# Patient Record
Sex: FEMALE | Race: WHITE | NOT HISPANIC OR LATINO | Employment: OTHER | RURAL
[De-identification: names, ages, dates, MRNs, and addresses within clinical notes are randomized per-mention and may not be internally consistent; named-entity substitution may affect disease eponyms.]

---

## 2018-07-18 ENCOUNTER — HISTORICAL (OUTPATIENT)
Dept: ADMINISTRATIVE | Facility: HOSPITAL | Age: 53
End: 2018-07-18

## 2018-07-19 LAB
LAB AP CLINICAL INFORMATION: NORMAL
LAB AP DIAGNOSIS - HISTORICAL: NORMAL
LAB AP GROSS PATHOLOGY - HISTORICAL: NORMAL
LAB AP SPECIMEN SUBMITTED - HISTORICAL: NORMAL

## 2018-08-21 ENCOUNTER — HISTORICAL (OUTPATIENT)
Dept: ADMINISTRATIVE | Facility: HOSPITAL | Age: 53
End: 2018-08-21

## 2020-10-12 ENCOUNTER — HISTORICAL (OUTPATIENT)
Dept: ADMINISTRATIVE | Facility: HOSPITAL | Age: 55
End: 2020-10-12

## 2020-10-12 LAB
25(OH)D3 SERPL-MCNC: 29.5 NG/ML
ALBUMIN SERPL BCP-MCNC: 3.6 G/DL (ref 3.5–5.5)
ALBUMIN/GLOB SERPL: 1 {RATIO}
ALP SERPL-CCNC: 77 U/L (ref 42–141)
ALT SERPL W P-5'-P-CCNC: 15 U/L (ref 10–47)
ANION GAP SERPL CALCULATED.3IONS-SCNC: 13.6 MMOL/L (ref 7–16)
AST SERPL W P-5'-P-CCNC: 23 U/L (ref 11–38)
BILIRUB SERPL-MCNC: 0.3 MG/DL (ref 0.2–1.6)
BUN SERPL-MCNC: 9 MG/DL (ref 7–22)
BUN/CREAT SERPL: 11.3 G/DL
CALCIUM SERPL-MCNC: 8.8 MG/DL (ref 8–10.3)
CHLORIDE SERPL-SCNC: 99 MMOL/L (ref 98–108)
CHOLEST SERPL-MCNC: 177 MG/DL
CHOLEST/HDLC SERPL: 5.2 {RATIO}
CO2 SERPL-SCNC: 24 MMOL/L (ref 18–33)
CREAT SERPL-MCNC: 0.8 MG/DL (ref 0.6–1.2)
ERYTHROCYTE [DISTWIDTH] IN BLOOD BY AUTOMATED COUNT: 14.2 % (ref 11.5–14.5)
EST. AVERAGE GLUCOSE BLD GHB EST-MCNC: 327 MG/DL
GLOBULIN SER-MCNC: 4 G/DL
GLUCOSE SERPL-MCNC: 252 MG/DL (ref 73–118)
HBA1C MFR BLD HPLC: 12.4 % (ref 4.5–6.6)
HCT VFR BLD AUTO: 39.4 % (ref 38–47)
HDLC SERPL-MCNC: 34 MG/DL
HGB BLD-MCNC: 12.4 G/DL (ref 12–16)
LDLC SERPL CALC-MCNC: 74 MG/DL
LYMPHOCYTES # BLD AUTO: 3.9 X10E3/UL (ref 0.9–3)
LYMPHOCYTES NFR BLD AUTO: 29 % (ref 16.8–42.5)
MAGNESIUM SERPL-MCNC: 1.8 MG/DL (ref 1.7–2.3)
MCH RBC QN AUTO: 26.5 PG (ref 27–31)
MCHC RBC AUTO-ENTMCNC: 31.5 G/DL (ref 32–36)
MCV RBC AUTO: 84.2 FL (ref 80–96)
MIXED, AUTO ABSOLUTE: 0.7 X10E3/UL (ref 0.3–1.1)
MIXED, AUTO RELATIVE: 5.4 % (ref 4.7–13.3)
MPC BLD CALC-MCNC: 10.3 FL (ref 9.4–12.4)
NEUTROPHILS # BLD AUTO: 8.7 X10E3/UL (ref 2.4–7.5)
NEUTROPHILS NFR BLD AUTO: 65.6 % (ref 46.4–74.7)
PLATELET # BLD AUTO: 321 X10E3/UL (ref 150–400)
POTASSIUM SERPL-SCNC: 3.6 MMOL/L (ref 3.6–5.1)
PROT SERPL-MCNC: 7.3 G/DL (ref 6.4–8.1)
RBC # BLD AUTO: 4.68 X10E6/UL (ref 4.2–5.4)
SODIUM SERPL-SCNC: 133 MMOL/L (ref 128–145)
T4 SERPL-MCNC: 10.8 UG/DL (ref 4.8–13.9)
TRIGL SERPL-MCNC: 345 MG/DL
TSH SERPL DL<=0.005 MIU/L-ACNC: 1.27 UIU/ML (ref 0.36–3.74)
WBC # BLD AUTO: 13.3 X10E3/UL (ref 4.5–11)

## 2020-11-12 ENCOUNTER — HISTORICAL (OUTPATIENT)
Dept: ADMINISTRATIVE | Facility: HOSPITAL | Age: 55
End: 2020-11-12

## 2020-11-18 ENCOUNTER — HISTORICAL (OUTPATIENT)
Dept: ADMINISTRATIVE | Facility: HOSPITAL | Age: 55
End: 2020-11-18

## 2020-11-24 ENCOUNTER — HISTORICAL (OUTPATIENT)
Dept: ADMINISTRATIVE | Facility: HOSPITAL | Age: 55
End: 2020-11-24

## 2020-11-25 LAB

## 2021-02-02 ENCOUNTER — HISTORICAL (OUTPATIENT)
Dept: ADMINISTRATIVE | Facility: HOSPITAL | Age: 56
End: 2021-02-02

## 2021-02-02 LAB
ANION GAP SERPL CALCULATED.3IONS-SCNC: 14 MMOL/L
BASOPHILS # BLD AUTO: 0.06 X10E3/UL (ref 0–0.2)
BASOPHILS NFR BLD AUTO: 0.6 % (ref 0–1)
BUN SERPL-MCNC: 11 MG/DL (ref 7–18)
CALCIUM SERPL-MCNC: 9 MG/DL (ref 8.5–10.1)
CHLORIDE SERPL-SCNC: 106 MMOL/L (ref 98–107)
CO2 SERPL-SCNC: 24 MMOL/L (ref 21–32)
CREAT SERPL-MCNC: 0.55 MG/DL (ref 0.55–1.02)
EOSINOPHIL # BLD AUTO: 0.48 X10E3/UL (ref 0–0.5)
EOSINOPHIL NFR BLD AUTO: 5.2 % (ref 1–4)
ERYTHROCYTE [DISTWIDTH] IN BLOOD BY AUTOMATED COUNT: 17 % (ref 11.5–14.5)
GLUCOSE SERPL-MCNC: 212 MG/DL (ref 70–105)
GLUCOSE SERPL-MCNC: 214 MG/DL (ref 74–106)
GLUCOSE SERPL-MCNC: 253 MG/DL (ref 70–105)
GLUCOSE SERPL-MCNC: 301 MG/DL (ref 70–105)
HCT VFR BLD AUTO: 39.9 % (ref 38–47)
HGB BLD-MCNC: 12.1 G/DL (ref 12–16)
IMM GRANULOCYTES # BLD AUTO: 0.02 X10E3/UL (ref 0–0.04)
IMM GRANULOCYTES NFR BLD: 0.2 % (ref 0–0.4)
LYMPHOCYTES # BLD AUTO: 2.69 X10E3/UL (ref 1–4.8)
LYMPHOCYTES NFR BLD AUTO: 29.1 % (ref 27–41)
MCH RBC QN AUTO: 24.8 PG (ref 27–31)
MCHC RBC AUTO-ENTMCNC: 30.3 G/DL (ref 32–36)
MCV RBC AUTO: 81.8 FL (ref 80–96)
MONOCYTES # BLD AUTO: 0.63 X10E3/UL (ref 0–0.8)
MONOCYTES NFR BLD AUTO: 6.8 % (ref 2–6)
MPC BLD CALC-MCNC: 10.7 FL (ref 9.4–12.4)
NEUTROPHILS # BLD AUTO: 5.37 X10E3/UL (ref 1.8–7.7)
NEUTROPHILS NFR BLD AUTO: 58.1 % (ref 53–65)
NRBC # BLD AUTO: 0 X10E3/UL (ref 0–0)
NRBC, AUTO (.00): 0 /100 (ref 0–0)
PLATELET # BLD AUTO: 319 X10E3/UL (ref 150–400)
POTASSIUM SERPL-SCNC: 3.7 MMOL/L (ref 3.5–5.1)
RBC # BLD AUTO: 4.88 X10E6/UL (ref 4.2–5.4)
SARS-COV-2 RNA AMPLIFICATION, QUAL: NEGATIVE
SODIUM SERPL-SCNC: 140 MMOL/L (ref 136–145)
WBC # BLD AUTO: 9.25 X10E3/UL (ref 4.5–11)

## 2021-02-03 ENCOUNTER — HISTORICAL (OUTPATIENT)
Dept: ADMINISTRATIVE | Facility: HOSPITAL | Age: 56
End: 2021-02-03

## 2021-02-03 LAB
GLUCOSE SERPL-MCNC: 173 MG/DL (ref 70–105)
GLUCOSE SERPL-MCNC: 244 MG/DL (ref 70–105)
GLUCOSE SERPL-MCNC: 283 MG/DL (ref 70–105)

## 2021-02-04 ENCOUNTER — HISTORICAL (OUTPATIENT)
Dept: ADMINISTRATIVE | Facility: HOSPITAL | Age: 56
End: 2021-02-04

## 2021-02-04 LAB
GLUCOSE SERPL-MCNC: 195 MG/DL (ref 70–105)
GLUCOSE SERPL-MCNC: 233 MG/DL (ref 70–105)

## 2021-02-05 ENCOUNTER — HISTORICAL (OUTPATIENT)
Dept: ADMINISTRATIVE | Facility: HOSPITAL | Age: 56
End: 2021-02-05

## 2021-02-05 LAB
ANION GAP SERPL CALCULATED.3IONS-SCNC: 14 MMOL/L
BUN SERPL-MCNC: 8 MG/DL (ref 7–18)
CALCIUM SERPL-MCNC: 8.8 MG/DL (ref 8.5–10.1)
CHLORIDE SERPL-SCNC: 107 MMOL/L (ref 98–107)
CO2 SERPL-SCNC: 23 MMOL/L (ref 21–32)
CREAT SERPL-MCNC: 0.47 MG/DL (ref 0.55–1.02)
GLUCOSE SERPL-MCNC: 175 MG/DL (ref 74–106)
POTASSIUM SERPL-SCNC: 3.8 MMOL/L (ref 3.5–5.1)
SODIUM SERPL-SCNC: 140 MMOL/L (ref 136–145)
TROPONIN I SERPL-MCNC: <0.017 NG/ML (ref 0–0.06)

## 2021-03-19 VITALS
SYSTOLIC BLOOD PRESSURE: 120 MMHG | BODY MASS INDEX: 48.4 KG/M2 | HEART RATE: 92 BPM | WEIGHT: 263 LBS | OXYGEN SATURATION: 98 % | HEIGHT: 62 IN | DIASTOLIC BLOOD PRESSURE: 60 MMHG

## 2021-04-13 ENCOUNTER — OFFICE VISIT (OUTPATIENT)
Dept: FAMILY MEDICINE | Facility: CLINIC | Age: 56
End: 2021-04-13
Payer: MEDICARE

## 2021-04-13 VITALS
DIASTOLIC BLOOD PRESSURE: 71 MMHG | HEIGHT: 67 IN | HEART RATE: 95 BPM | WEIGHT: 209 LBS | BODY MASS INDEX: 32.8 KG/M2 | OXYGEN SATURATION: 96 % | TEMPERATURE: 99 F | RESPIRATION RATE: 17 BRPM | SYSTOLIC BLOOD PRESSURE: 133 MMHG

## 2021-04-13 DIAGNOSIS — E55.9 VITAMIN D DEFICIENCY, UNSPECIFIED: ICD-10-CM

## 2021-04-13 DIAGNOSIS — I25.10 CORONARY ARTERY DISEASE, ANGINA PRESENCE UNSPECIFIED, UNSPECIFIED VESSEL OR LESION TYPE, UNSPECIFIED WHETHER NATIVE OR TRANSPLANTED HEART: ICD-10-CM

## 2021-04-13 DIAGNOSIS — E11.69 TYPE 2 DIABETES MELLITUS WITH OTHER SPECIFIED COMPLICATION, WITHOUT LONG-TERM CURRENT USE OF INSULIN: Primary | ICD-10-CM

## 2021-04-13 DIAGNOSIS — Z00.00 PERIODIC HEALTH ASSESSMENT, GENERAL SCREENING, ADULT: ICD-10-CM

## 2021-04-13 DIAGNOSIS — F32.A ANXIETY AND DEPRESSION: ICD-10-CM

## 2021-04-13 DIAGNOSIS — M19.90 ARTHRITIS: ICD-10-CM

## 2021-04-13 DIAGNOSIS — K21.9 GASTROESOPHAGEAL REFLUX DISEASE, UNSPECIFIED WHETHER ESOPHAGITIS PRESENT: ICD-10-CM

## 2021-04-13 DIAGNOSIS — J44.9 CHRONIC OBSTRUCTIVE PULMONARY DISEASE, UNSPECIFIED COPD TYPE: ICD-10-CM

## 2021-04-13 DIAGNOSIS — J01.00 ACUTE MAXILLARY SINUSITIS, RECURRENCE NOT SPECIFIED: ICD-10-CM

## 2021-04-13 DIAGNOSIS — F41.9 ANXIETY AND DEPRESSION: ICD-10-CM

## 2021-04-13 DIAGNOSIS — E55.9 VITAMIN D DEFICIENCY: ICD-10-CM

## 2021-04-13 PROBLEM — E11.9 TYPE 2 DIABETES MELLITUS, WITHOUT LONG-TERM CURRENT USE OF INSULIN: Status: ACTIVE | Noted: 2021-04-13

## 2021-04-13 PROCEDURE — 96372 THER/PROPH/DIAG INJ SC/IM: CPT | Mod: ,,, | Performed by: FAMILY MEDICINE

## 2021-04-13 PROCEDURE — 99215 PR OFFICE/OUTPT VISIT, EST, LEVL V, 40-54 MIN: ICD-10-PCS | Mod: 25,,, | Performed by: FAMILY MEDICINE

## 2021-04-13 PROCEDURE — 99215 OFFICE O/P EST HI 40 MIN: CPT | Mod: 25,,, | Performed by: FAMILY MEDICINE

## 2021-04-13 PROCEDURE — 96372 PR INJECTION,THERAP/PROPH/DIAG2ST, IM OR SUBCUT: ICD-10-PCS | Mod: ,,, | Performed by: FAMILY MEDICINE

## 2021-04-13 RX ORDER — PIOGLITAZONEHYDROCHLORIDE 15 MG/1
15 TABLET ORAL DAILY
COMMUNITY
Start: 2021-03-11 | End: 2021-04-13 | Stop reason: SDUPTHER

## 2021-04-13 RX ORDER — HYDROCODONE BITARTRATE AND ACETAMINOPHEN 10; 325 MG/1; MG/1
TABLET ORAL
COMMUNITY
Start: 2021-03-12

## 2021-04-13 RX ORDER — INSULIN GLARGINE 100 [IU]/ML
INJECTION, SOLUTION SUBCUTANEOUS
COMMUNITY
End: 2021-04-13 | Stop reason: SDUPTHER

## 2021-04-13 RX ORDER — METOPROLOL SUCCINATE 25 MG/1
12.5 TABLET, EXTENDED RELEASE ORAL 2 TIMES DAILY
Qty: 90 TABLET | Refills: 1 | Status: SHIPPED | OUTPATIENT
Start: 2021-04-13 | End: 2021-10-12 | Stop reason: SDUPTHER

## 2021-04-13 RX ORDER — ASPIRIN 81 MG/1
81 TABLET ORAL
COMMUNITY
End: 2023-06-08 | Stop reason: SDUPTHER

## 2021-04-13 RX ORDER — METOPROLOL SUCCINATE 25 MG/1
12.5 TABLET, EXTENDED RELEASE ORAL 2 TIMES DAILY
COMMUNITY
Start: 2021-03-11 | End: 2021-04-13 | Stop reason: SDUPTHER

## 2021-04-13 RX ORDER — FOSINOPIRL SODIUM 10 MG/1
10 TABLET ORAL DAILY
Qty: 90 TABLET | Refills: 1 | Status: SHIPPED | OUTPATIENT
Start: 2021-04-13 | End: 2022-02-14 | Stop reason: SDUPTHER

## 2021-04-13 RX ORDER — ESZOPICLONE 3 MG/1
3 TABLET, FILM COATED ORAL NIGHTLY
Qty: 90 TABLET | Refills: 0 | Status: SHIPPED | OUTPATIENT
Start: 2021-04-13 | End: 2021-08-17 | Stop reason: SDUPTHER

## 2021-04-13 RX ORDER — PROMETHAZINE HYDROCHLORIDE 50 MG/1
50 TABLET ORAL
COMMUNITY
Start: 2021-03-01 | End: 2021-08-17 | Stop reason: SDUPTHER

## 2021-04-13 RX ORDER — CEFTRIAXONE 1 G/1
1 INJECTION, POWDER, FOR SOLUTION INTRAMUSCULAR; INTRAVENOUS
Status: COMPLETED | OUTPATIENT
Start: 2021-04-13 | End: 2021-04-13

## 2021-04-13 RX ORDER — DEXLANSOPRAZOLE 60 MG/1
1 CAPSULE, DELAYED RELEASE ORAL DAILY
COMMUNITY
Start: 2021-03-11 | End: 2021-04-13 | Stop reason: SDUPTHER

## 2021-04-13 RX ORDER — GLIMEPIRIDE 2 MG/1
2 TABLET ORAL
Qty: 90 TABLET | Refills: 1 | Status: SHIPPED | OUTPATIENT
Start: 2021-04-13 | End: 2021-10-12 | Stop reason: SDUPTHER

## 2021-04-13 RX ORDER — APIXABAN 2.5 MG/1
2.5 TABLET, FILM COATED ORAL 2 TIMES DAILY
COMMUNITY
Start: 2021-03-11 | End: 2021-04-13 | Stop reason: SDUPTHER

## 2021-04-13 RX ORDER — NITROGLYCERIN 0.4 MG/1
TABLET SUBLINGUAL
COMMUNITY
Start: 2021-03-11 | End: 2021-04-13 | Stop reason: SDUPTHER

## 2021-04-13 RX ORDER — NITROGLYCERIN 0.4 MG/1
0.4 TABLET SUBLINGUAL
Qty: 25 TABLET | Refills: 2 | Status: SHIPPED | OUTPATIENT
Start: 2021-04-13 | End: 2022-02-14 | Stop reason: SDUPTHER

## 2021-04-13 RX ORDER — AMITRIPTYLINE HYDROCHLORIDE 25 MG/1
25 TABLET, FILM COATED ORAL DAILY
COMMUNITY
Start: 2021-03-11 | End: 2021-04-13 | Stop reason: SDUPTHER

## 2021-04-13 RX ORDER — CLOPIDOGREL BISULFATE 75 MG/1
75 TABLET ORAL DAILY
Qty: 90 TABLET | Refills: 1 | Status: SHIPPED | OUTPATIENT
Start: 2021-04-13 | End: 2021-10-12 | Stop reason: SDUPTHER

## 2021-04-13 RX ORDER — DEXLANSOPRAZOLE 60 MG/1
1 CAPSULE, DELAYED RELEASE ORAL DAILY
Qty: 30 CAPSULE | Refills: 11 | Status: SHIPPED | OUTPATIENT
Start: 2021-04-13 | End: 2021-10-12 | Stop reason: SDUPTHER

## 2021-04-13 RX ORDER — MORPHINE SULFATE 15 MG/1
TABLET, FILM COATED, EXTENDED RELEASE ORAL
COMMUNITY
Start: 2021-03-12

## 2021-04-13 RX ORDER — AMITRIPTYLINE HYDROCHLORIDE 25 MG/1
25 TABLET, FILM COATED ORAL DAILY
Qty: 180 TABLET | Refills: 1 | Status: SHIPPED | OUTPATIENT
Start: 2021-04-13 | End: 2021-10-12 | Stop reason: SDUPTHER

## 2021-04-13 RX ORDER — CYCLOSPORINE 0.5 MG/ML
EMULSION OPHTHALMIC
COMMUNITY
Start: 2021-02-17 | End: 2021-04-13 | Stop reason: SDUPTHER

## 2021-04-13 RX ORDER — METHOCARBAMOL 500 MG/1
TABLET, FILM COATED ORAL
COMMUNITY
Start: 2021-03-11 | End: 2024-03-20 | Stop reason: SDUPTHER

## 2021-04-13 RX ORDER — INSULIN GLARGINE 100 [IU]/ML
35 INJECTION, SOLUTION SUBCUTANEOUS DAILY
Qty: 30 ML | Refills: 2 | Status: SHIPPED | OUTPATIENT
Start: 2021-04-13 | End: 2021-08-17 | Stop reason: SDUPTHER

## 2021-04-13 RX ORDER — SUMATRIPTAN SUCCINATE 100 MG/1
TABLET ORAL
COMMUNITY
Start: 2021-01-11 | End: 2021-04-13 | Stop reason: SDUPTHER

## 2021-04-13 RX ORDER — AZITHROMYCIN 250 MG/1
250 TABLET, FILM COATED ORAL DAILY
Qty: 6 TABLET | Refills: 0 | Status: SHIPPED | OUTPATIENT
Start: 2021-04-13 | End: 2022-02-14

## 2021-04-13 RX ORDER — CYCLOSPORINE 0.5 MG/ML
1 EMULSION OPHTHALMIC 2 TIMES DAILY
Qty: 60 EACH | Refills: 2 | Status: SHIPPED | OUTPATIENT
Start: 2021-04-13 | End: 2021-10-12 | Stop reason: SDUPTHER

## 2021-04-13 RX ORDER — ESZOPICLONE 3 MG/1
TABLET, FILM COATED ORAL
COMMUNITY
Start: 2021-01-11 | End: 2021-04-13 | Stop reason: SDUPTHER

## 2021-04-13 RX ORDER — ATORVASTATIN CALCIUM 80 MG/1
80 TABLET, FILM COATED ORAL DAILY
Qty: 60 TABLET | Refills: 2 | Status: SHIPPED | OUTPATIENT
Start: 2021-04-13 | End: 2021-10-12 | Stop reason: SDUPTHER

## 2021-04-13 RX ORDER — FOSINOPIRL SODIUM 10 MG/1
10 TABLET ORAL
COMMUNITY
End: 2021-04-13 | Stop reason: SDUPTHER

## 2021-04-13 RX ORDER — PIOGLITAZONEHYDROCHLORIDE 15 MG/1
15 TABLET ORAL DAILY
Qty: 90 TABLET | Refills: 1 | Status: SHIPPED | OUTPATIENT
Start: 2021-04-13 | End: 2021-10-12 | Stop reason: SDUPTHER

## 2021-04-13 RX ORDER — GLIMEPIRIDE 2 MG/1
TABLET ORAL
COMMUNITY
Start: 2021-03-11 | End: 2021-04-13 | Stop reason: SDUPTHER

## 2021-04-13 RX ORDER — APIXABAN 2.5 MG/1
2.5 TABLET, FILM COATED ORAL 2 TIMES DAILY
Qty: 60 TABLET | Refills: 0 | Status: SHIPPED | OUTPATIENT
Start: 2021-04-13 | End: 2021-10-12 | Stop reason: SDUPTHER

## 2021-04-13 RX ORDER — INSULIN GLARGINE 100 [IU]/ML
INJECTION, SOLUTION SUBCUTANEOUS
COMMUNITY
Start: 2021-02-17 | End: 2021-04-13 | Stop reason: SDUPTHER

## 2021-04-13 RX ORDER — SUMATRIPTAN SUCCINATE 100 MG/1
100 TABLET ORAL ONCE
Qty: 10 TABLET | Refills: 2 | Status: SHIPPED | OUTPATIENT
Start: 2021-04-13 | End: 2021-09-24

## 2021-04-13 RX ORDER — ATORVASTATIN CALCIUM 80 MG/1
80 TABLET, FILM COATED ORAL
COMMUNITY
End: 2021-04-13 | Stop reason: SDUPTHER

## 2021-04-13 RX ORDER — INSULIN GLARGINE 100 [IU]/ML
1 INJECTION, SOLUTION SUBCUTANEOUS NIGHTLY
Qty: 35 SYRINGE | Refills: 2 | Status: SHIPPED | OUTPATIENT
Start: 2021-04-13 | End: 2021-04-14 | Stop reason: CLARIF

## 2021-04-13 RX ORDER — PREGABALIN 200 MG/1
CAPSULE ORAL
COMMUNITY
Start: 2021-03-12 | End: 2024-01-30 | Stop reason: SDUPTHER

## 2021-04-13 RX ORDER — CLOPIDOGREL BISULFATE 75 MG/1
75 TABLET ORAL DAILY
COMMUNITY
Start: 2021-03-11 | End: 2021-04-13 | Stop reason: SDUPTHER

## 2021-04-13 RX ADMIN — CEFTRIAXONE 1 G: 1 INJECTION, POWDER, FOR SOLUTION INTRAMUSCULAR; INTRAVENOUS at 09:04

## 2021-04-14 ENCOUNTER — TELEPHONE (OUTPATIENT)
Dept: FAMILY MEDICINE | Facility: CLINIC | Age: 56
End: 2021-04-14

## 2021-04-28 ENCOUNTER — TELEPHONE (OUTPATIENT)
Dept: FAMILY MEDICINE | Facility: CLINIC | Age: 56
End: 2021-04-28

## 2021-05-05 ENCOUNTER — OFFICE VISIT (OUTPATIENT)
Dept: CARDIOLOGY | Facility: CLINIC | Age: 56
End: 2021-05-05
Payer: MEDICARE

## 2021-05-05 VITALS
HEIGHT: 67 IN | SYSTOLIC BLOOD PRESSURE: 142 MMHG | HEART RATE: 83 BPM | DIASTOLIC BLOOD PRESSURE: 76 MMHG | BODY MASS INDEX: 32.38 KG/M2 | WEIGHT: 206.31 LBS | OXYGEN SATURATION: 97 %

## 2021-05-05 DIAGNOSIS — E78.2 MIXED HYPERLIPIDEMIA: ICD-10-CM

## 2021-05-05 DIAGNOSIS — I73.9 PERIPHERAL ARTERY DISEASE: ICD-10-CM

## 2021-05-05 DIAGNOSIS — I25.110 ATHEROSCLEROSIS OF NATIVE CORONARY ARTERY OF NATIVE HEART WITH UNSTABLE ANGINA PECTORIS: Primary | ICD-10-CM

## 2021-05-05 PROCEDURE — 99215 OFFICE O/P EST HI 40 MIN: CPT | Mod: PBBFAC | Performed by: INTERNAL MEDICINE

## 2021-05-05 PROCEDURE — 99214 PR OFFICE/OUTPT VISIT, EST, LEVL IV, 30-39 MIN: ICD-10-PCS | Mod: S$PBB,,, | Performed by: INTERNAL MEDICINE

## 2021-05-05 PROCEDURE — 99214 OFFICE O/P EST MOD 30 MIN: CPT | Mod: S$PBB,,, | Performed by: INTERNAL MEDICINE

## 2021-05-05 PROCEDURE — 99215 OFFICE O/P EST HI 40 MIN: CPT | Mod: PBBFAC,,, | Performed by: INTERNAL MEDICINE

## 2021-05-05 PROCEDURE — 99215 HC OFFICE/OUTPT VISIT, EST, LEVL V, 40-54 MIN: ICD-10-PCS | Mod: PBBFAC,,, | Performed by: INTERNAL MEDICINE

## 2021-05-05 RX ORDER — ICOSAPENT ETHYL 1000 MG/1
2 CAPSULE ORAL 2 TIMES DAILY
Qty: 60 CAPSULE | Refills: 3 | Status: SHIPPED | OUTPATIENT
Start: 2021-05-05 | End: 2021-10-12 | Stop reason: SDUPTHER

## 2021-05-05 RX ORDER — EZETIMIBE 10 MG/1
10 TABLET ORAL DAILY
Qty: 90 TABLET | Refills: 3 | Status: SHIPPED | OUTPATIENT
Start: 2021-05-05 | End: 2021-10-12 | Stop reason: SDUPTHER

## 2021-05-11 ENCOUNTER — OFFICE VISIT (OUTPATIENT)
Dept: OBSTETRICS AND GYNECOLOGY | Facility: CLINIC | Age: 56
End: 2021-05-11
Payer: MEDICARE

## 2021-05-11 ENCOUNTER — HOSPITAL ENCOUNTER (OUTPATIENT)
Dept: RADIOLOGY | Facility: HOSPITAL | Age: 56
Discharge: HOME | End: 2021-05-11
Attending: OBSTETRICS & GYNECOLOGY
Payer: MEDICARE

## 2021-05-11 VITALS
BODY MASS INDEX: 32.82 KG/M2 | RESPIRATION RATE: 16 BRPM | HEIGHT: 67 IN | DIASTOLIC BLOOD PRESSURE: 78 MMHG | WEIGHT: 209.13 LBS | SYSTOLIC BLOOD PRESSURE: 160 MMHG

## 2021-05-11 DIAGNOSIS — Z00.00 PERIODIC HEALTH ASSESSMENT, GENERAL SCREENING, ADULT: ICD-10-CM

## 2021-05-11 DIAGNOSIS — R10.2 PELVIC PAIN: ICD-10-CM

## 2021-05-11 DIAGNOSIS — Z12.4 SCREENING FOR MALIGNANT NEOPLASM OF THE CERVIX: ICD-10-CM

## 2021-05-11 DIAGNOSIS — Z90.710 SCREENING FOR MALIGNANT NEOPLASM OF VAGINA AFTER TOTAL HYSTERECTOMY: Primary | ICD-10-CM

## 2021-05-11 DIAGNOSIS — Z12.72 SCREENING FOR MALIGNANT NEOPLASM OF VAGINA AFTER TOTAL HYSTERECTOMY: Primary | ICD-10-CM

## 2021-05-11 PROCEDURE — 87624 HPV HI-RISK TYP POOLED RSLT: CPT | Mod: ,,, | Performed by: CLINICAL MEDICAL LABORATORY

## 2021-05-11 PROCEDURE — G0124 THINPREP PAP TEST: ICD-10-PCS | Mod: ,,, | Performed by: PATHOLOGY

## 2021-05-11 PROCEDURE — G0101 CA SCREEN;PELVIC/BREAST EXAM: HCPCS | Mod: S$PBB,,, | Performed by: OBSTETRICS & GYNECOLOGY

## 2021-05-11 PROCEDURE — 87624 HUMAN PAPILLOMAVIRUS (HPV): ICD-10-PCS | Mod: ,,, | Performed by: CLINICAL MEDICAL LABORATORY

## 2021-05-11 PROCEDURE — G0123 SCREEN CERV/VAG THIN LAYER: HCPCS | Mod: GCY,GZ | Performed by: OBSTETRICS & GYNECOLOGY

## 2021-05-11 PROCEDURE — 76856 US PELVIS COMPLETE NON OB: ICD-10-PCS | Mod: 26,,, | Performed by: RADIOLOGY

## 2021-05-11 PROCEDURE — 76856 US EXAM PELVIC COMPLETE: CPT | Mod: 26,,, | Performed by: RADIOLOGY

## 2021-05-11 PROCEDURE — 99215 OFFICE O/P EST HI 40 MIN: CPT | Mod: PBBFAC,25 | Performed by: OBSTETRICS & GYNECOLOGY

## 2021-05-11 PROCEDURE — 76856 US EXAM PELVIC COMPLETE: CPT | Mod: TC

## 2021-05-11 PROCEDURE — G0124 SCREEN C/V THIN LAYER BY MD: HCPCS | Mod: ,,, | Performed by: PATHOLOGY

## 2021-05-11 PROCEDURE — G0101 PR CA SCREEN;PELVIC/BREAST EXAM: ICD-10-PCS | Mod: S$PBB,,, | Performed by: OBSTETRICS & GYNECOLOGY

## 2021-05-12 ENCOUNTER — HOSPITAL ENCOUNTER (OUTPATIENT)
Dept: RADIOLOGY | Facility: HOSPITAL | Age: 56
Discharge: HOME | End: 2021-05-12
Payer: MEDICARE

## 2021-05-12 ENCOUNTER — HOSPITAL ENCOUNTER (OUTPATIENT)
Dept: RADIOLOGY | Facility: HOSPITAL | Age: 56
Discharge: HOME | End: 2021-05-12
Attending: RADIOLOGY
Payer: MEDICARE

## 2021-05-12 VITALS — BODY MASS INDEX: 32.83 KG/M2 | HEIGHT: 67 IN | WEIGHT: 209.19 LBS

## 2021-05-12 DIAGNOSIS — R92.8 ABNORMAL SCREENING MAMMOGRAM: ICD-10-CM

## 2021-05-12 DIAGNOSIS — R92.8 ABNORMAL FINDING ON BREAST IMAGING: ICD-10-CM

## 2021-05-12 PROCEDURE — 77065 DX MAMMO INCL CAD UNI: CPT | Mod: TC,LT

## 2021-05-12 PROCEDURE — 76642 ULTRASOUND BREAST LIMITED: CPT | Mod: TC,LT

## 2021-05-13 LAB
GH SERPL-MCNC: ABNORMAL NG/ML
INSULIN SERPL-ACNC: ABNORMAL U[IU]/ML
LAB AP CLINICAL INFORMATION: ABNORMAL
LAB AP GYN INTERPRETATION: ABNORMAL
LAB AP PAP DISCLAIMER COMMENTS: ABNORMAL
RENIN PLAS-CCNC: ABNORMAL NG/ML/H

## 2021-05-21 LAB
HPV 16: NEGATIVE
HPV 18: NEGATIVE
HPV OTHER: NEGATIVE

## 2021-06-05 PROBLEM — Z90.710 SCREENING FOR MALIGNANT NEOPLASM OF VAGINA AFTER TOTAL HYSTERECTOMY: Status: ACTIVE | Noted: 2021-06-05

## 2021-06-05 PROBLEM — Z12.72 SCREENING FOR MALIGNANT NEOPLASM OF VAGINA AFTER TOTAL HYSTERECTOMY: Status: ACTIVE | Noted: 2021-06-05

## 2021-06-09 DIAGNOSIS — E11.69 TYPE 2 DIABETES MELLITUS WITH OTHER SPECIFIED COMPLICATION, WITHOUT LONG-TERM CURRENT USE OF INSULIN: Primary | ICD-10-CM

## 2021-06-22 ENCOUNTER — TELEPHONE (OUTPATIENT)
Dept: FAMILY MEDICINE | Facility: CLINIC | Age: 56
End: 2021-06-22

## 2021-07-01 ENCOUNTER — PATIENT MESSAGE (OUTPATIENT)
Dept: ADMINISTRATIVE | Facility: OTHER | Age: 56
End: 2021-07-01

## 2021-08-17 ENCOUNTER — OFFICE VISIT (OUTPATIENT)
Dept: FAMILY MEDICINE | Facility: CLINIC | Age: 56
End: 2021-08-17
Payer: MEDICARE

## 2021-08-17 VITALS
SYSTOLIC BLOOD PRESSURE: 146 MMHG | OXYGEN SATURATION: 99 % | HEART RATE: 96 BPM | BODY MASS INDEX: 32.96 KG/M2 | DIASTOLIC BLOOD PRESSURE: 72 MMHG | HEIGHT: 67 IN | WEIGHT: 210 LBS | RESPIRATION RATE: 18 BRPM

## 2021-08-17 DIAGNOSIS — E11.69 TYPE 2 DIABETES MELLITUS WITH OTHER SPECIFIED COMPLICATION, WITHOUT LONG-TERM CURRENT USE OF INSULIN: Primary | ICD-10-CM

## 2021-08-17 DIAGNOSIS — I25.110 ATHEROSCLEROSIS OF NATIVE CORONARY ARTERY OF NATIVE HEART WITH UNSTABLE ANGINA PECTORIS: ICD-10-CM

## 2021-08-17 DIAGNOSIS — G43.909 MIGRAINE WITHOUT STATUS MIGRAINOSUS, NOT INTRACTABLE, UNSPECIFIED MIGRAINE TYPE: ICD-10-CM

## 2021-08-17 DIAGNOSIS — E78.2 MIXED HYPERLIPIDEMIA: ICD-10-CM

## 2021-08-17 LAB
ANION GAP SERPL CALCULATED.3IONS-SCNC: 10 MMOL/L (ref 7–16)
BUN SERPL-MCNC: 9 MG/DL (ref 7–18)
BUN/CREAT SERPL: 21 (ref 6–20)
CALCIUM SERPL-MCNC: 8.7 MG/DL (ref 8.5–10.1)
CHLORIDE SERPL-SCNC: 109 MMOL/L (ref 98–107)
CO2 SERPL-SCNC: 24 MMOL/L (ref 21–32)
CREAT SERPL-MCNC: 0.42 MG/DL (ref 0.55–1.02)
EST. AVERAGE GLUCOSE BLD GHB EST-MCNC: 254 MG/DL
GLUCOSE SERPL-MCNC: 128 MG/DL (ref 74–106)
HBA1C MFR BLD HPLC: 10.2 % (ref 4.5–6.6)
POTASSIUM SERPL-SCNC: 3.8 MMOL/L (ref 3.5–5.1)
SODIUM SERPL-SCNC: 139 MMOL/L (ref 136–145)

## 2021-08-17 PROCEDURE — 99204 PR OFFICE/OUTPT VISIT, NEW, LEVL IV, 45-59 MIN: ICD-10-PCS | Mod: ,,, | Performed by: INTERNAL MEDICINE

## 2021-08-17 PROCEDURE — 80048 BASIC METABOLIC PNL TOTAL CA: CPT | Mod: ,,, | Performed by: CLINICAL MEDICAL LABORATORY

## 2021-08-17 PROCEDURE — 99204 OFFICE O/P NEW MOD 45 MIN: CPT | Mod: ,,, | Performed by: INTERNAL MEDICINE

## 2021-08-17 PROCEDURE — 80048 BASIC METABOLIC PANEL: ICD-10-PCS | Mod: ,,, | Performed by: CLINICAL MEDICAL LABORATORY

## 2021-08-17 PROCEDURE — 83036 HEMOGLOBIN A1C: ICD-10-PCS | Mod: ,,, | Performed by: CLINICAL MEDICAL LABORATORY

## 2021-08-17 PROCEDURE — 83036 HEMOGLOBIN GLYCOSYLATED A1C: CPT | Mod: ,,, | Performed by: CLINICAL MEDICAL LABORATORY

## 2021-08-17 RX ORDER — PROMETHAZINE HYDROCHLORIDE 50 MG/1
50 TABLET ORAL
Qty: 20 TABLET | Refills: 1 | Status: SHIPPED | OUTPATIENT
Start: 2021-08-17 | End: 2021-10-12 | Stop reason: SDUPTHER

## 2021-08-17 RX ORDER — ESZOPICLONE 3 MG/1
3 TABLET, FILM COATED ORAL NIGHTLY
Qty: 90 TABLET | Refills: 0 | Status: SHIPPED | OUTPATIENT
Start: 2021-08-17 | End: 2021-08-18 | Stop reason: SDUPTHER

## 2021-08-17 RX ORDER — INSULIN GLARGINE 100 [IU]/ML
35 INJECTION, SOLUTION SUBCUTANEOUS DAILY
Qty: 30 ML | Refills: 2 | Status: SHIPPED | OUTPATIENT
Start: 2021-08-17 | End: 2021-08-18 | Stop reason: SDUPTHER

## 2021-08-17 RX ORDER — ESZOPICLONE 3 MG/1
3 TABLET, FILM COATED ORAL NIGHTLY
Qty: 90 TABLET | Refills: 0 | Status: CANCELLED | OUTPATIENT
Start: 2021-08-17

## 2021-08-17 RX ORDER — INSULIN GLARGINE 100 [IU]/ML
35 INJECTION, SOLUTION SUBCUTANEOUS DAILY
Qty: 30 ML | Refills: 2 | Status: CANCELLED | OUTPATIENT
Start: 2021-08-17

## 2021-08-18 RX ORDER — INSULIN GLARGINE 100 [IU]/ML
35 INJECTION, SOLUTION SUBCUTANEOUS DAILY
Qty: 30 ML | Refills: 3 | Status: SHIPPED | OUTPATIENT
Start: 2021-08-18 | End: 2022-02-14 | Stop reason: SDUPTHER

## 2021-08-18 RX ORDER — ESZOPICLONE 3 MG/1
3 TABLET, FILM COATED ORAL NIGHTLY
Qty: 90 TABLET | Refills: 0 | Status: SHIPPED | OUTPATIENT
Start: 2021-08-18 | End: 2021-10-12 | Stop reason: SDUPTHER

## 2021-09-22 ENCOUNTER — CLINICAL SUPPORT (OUTPATIENT)
Dept: AUDIOLOGY | Facility: CLINIC | Age: 56
End: 2021-09-22
Payer: MEDICARE

## 2021-09-22 ENCOUNTER — OFFICE VISIT (OUTPATIENT)
Dept: OTOLARYNGOLOGY | Facility: CLINIC | Age: 56
End: 2021-09-22
Payer: MEDICARE

## 2021-09-22 VITALS — BODY MASS INDEX: 32.96 KG/M2 | HEIGHT: 67 IN | WEIGHT: 210 LBS

## 2021-09-22 DIAGNOSIS — R47.02 DYSPHASIA: ICD-10-CM

## 2021-09-22 DIAGNOSIS — R13.10 DYSPHAGIA, UNSPECIFIED TYPE: Primary | ICD-10-CM

## 2021-09-22 DIAGNOSIS — R13.11 DYSPHAGIA, ORAL PHASE: ICD-10-CM

## 2021-09-22 DIAGNOSIS — H90.3 SENSORINEURAL HEARING LOSS (SNHL) OF BOTH EARS: ICD-10-CM

## 2021-09-22 DIAGNOSIS — H90.3 SENSORINEURAL HEARING LOSS (SNHL) OF BOTH EARS: Primary | ICD-10-CM

## 2021-09-22 PROCEDURE — 92557 COMPREHENSIVE HEARING TEST: CPT | Mod: PBBFAC | Performed by: AUDIOLOGIST

## 2021-09-22 PROCEDURE — 99204 PR OFFICE/OUTPT VISIT, NEW, LEVL IV, 45-59 MIN: ICD-10-PCS | Mod: S$PBB,,, | Performed by: OTOLARYNGOLOGY

## 2021-09-22 PROCEDURE — 99204 OFFICE O/P NEW MOD 45 MIN: CPT | Mod: S$PBB,,, | Performed by: OTOLARYNGOLOGY

## 2021-09-22 PROCEDURE — 99211 OFF/OP EST MAY X REQ PHY/QHP: CPT | Mod: PBBFAC,27 | Performed by: AUDIOLOGIST

## 2021-09-22 PROCEDURE — 99214 OFFICE O/P EST MOD 30 MIN: CPT | Mod: PBBFAC | Performed by: OTOLARYNGOLOGY

## 2021-09-22 PROCEDURE — 92567 TYMPANOMETRY: CPT | Mod: PBBFAC | Performed by: AUDIOLOGIST

## 2021-09-24 ENCOUNTER — OFFICE VISIT (OUTPATIENT)
Dept: NEUROLOGY | Facility: CLINIC | Age: 56
End: 2021-09-24
Payer: MEDICARE

## 2021-09-24 VITALS
WEIGHT: 209 LBS | OXYGEN SATURATION: 97 % | BODY MASS INDEX: 32.8 KG/M2 | SYSTOLIC BLOOD PRESSURE: 156 MMHG | HEIGHT: 67 IN | HEART RATE: 88 BPM | DIASTOLIC BLOOD PRESSURE: 64 MMHG

## 2021-09-24 DIAGNOSIS — E55.9 VITAMIN D DEFICIENCY: Primary | ICD-10-CM

## 2021-09-24 DIAGNOSIS — E11.42 DIABETIC PERIPHERAL NEUROPATHY: ICD-10-CM

## 2021-09-24 DIAGNOSIS — E03.9 HYPOTHYROIDISM, UNSPECIFIED TYPE: ICD-10-CM

## 2021-09-24 DIAGNOSIS — E53.8 VITAMIN B12 DEFICIENCY: ICD-10-CM

## 2021-09-24 DIAGNOSIS — R51.9 CHRONIC INTRACTABLE HEADACHE, UNSPECIFIED HEADACHE TYPE: ICD-10-CM

## 2021-09-24 DIAGNOSIS — R41.3 MEMORY IMPAIRMENT: ICD-10-CM

## 2021-09-24 DIAGNOSIS — G89.29 CHRONIC INTRACTABLE HEADACHE, UNSPECIFIED HEADACHE TYPE: ICD-10-CM

## 2021-09-24 PROCEDURE — 99214 OFFICE O/P EST MOD 30 MIN: CPT | Mod: S$PBB,,, | Performed by: NURSE PRACTITIONER

## 2021-09-24 PROCEDURE — 99214 PR OFFICE/OUTPT VISIT, EST, LEVL IV, 30-39 MIN: ICD-10-PCS | Mod: S$PBB,,, | Performed by: NURSE PRACTITIONER

## 2021-09-24 PROCEDURE — 99214 OFFICE O/P EST MOD 30 MIN: CPT | Mod: PBBFAC | Performed by: NURSE PRACTITIONER

## 2021-09-29 DIAGNOSIS — R13.10 DYSPHAGIA, UNSPECIFIED TYPE: Primary | ICD-10-CM

## 2021-09-29 DIAGNOSIS — R13.14 DYSPHAGIA, PHARYNGOESOPHAGEAL: ICD-10-CM

## 2021-10-12 ENCOUNTER — OFFICE VISIT (OUTPATIENT)
Dept: FAMILY MEDICINE | Facility: CLINIC | Age: 56
End: 2021-10-12
Payer: MEDICARE

## 2021-10-12 ENCOUNTER — APPOINTMENT (OUTPATIENT)
Dept: RADIOLOGY | Facility: CLINIC | Age: 56
End: 2021-10-12
Attending: INTERNAL MEDICINE
Payer: MEDICARE

## 2021-10-12 VITALS
HEART RATE: 97 BPM | RESPIRATION RATE: 18 BRPM | DIASTOLIC BLOOD PRESSURE: 77 MMHG | SYSTOLIC BLOOD PRESSURE: 155 MMHG | OXYGEN SATURATION: 100 %

## 2021-10-12 DIAGNOSIS — G43.909 MIGRAINE WITHOUT STATUS MIGRAINOSUS, NOT INTRACTABLE, UNSPECIFIED MIGRAINE TYPE: Primary | ICD-10-CM

## 2021-10-12 DIAGNOSIS — G89.4 CHRONIC PAIN SYNDROME: ICD-10-CM

## 2021-10-12 DIAGNOSIS — E11.69 TYPE 2 DIABETES MELLITUS WITH OTHER SPECIFIED COMPLICATION, WITHOUT LONG-TERM CURRENT USE OF INSULIN: ICD-10-CM

## 2021-10-12 DIAGNOSIS — E78.2 MIXED HYPERLIPIDEMIA: ICD-10-CM

## 2021-10-12 DIAGNOSIS — I25.10 CORONARY ARTERY DISEASE, UNSPECIFIED VESSEL OR LESION TYPE, UNSPECIFIED WHETHER ANGINA PRESENT, UNSPECIFIED WHETHER NATIVE OR TRANSPLANTED HEART: ICD-10-CM

## 2021-10-12 DIAGNOSIS — J32.9 SINUSITIS, UNSPECIFIED CHRONICITY, UNSPECIFIED LOCATION: ICD-10-CM

## 2021-10-12 DIAGNOSIS — G43.909 MIGRAINE WITHOUT STATUS MIGRAINOSUS, NOT INTRACTABLE, UNSPECIFIED MIGRAINE TYPE: ICD-10-CM

## 2021-10-12 DIAGNOSIS — R13.10 DYSPHAGIA, UNSPECIFIED TYPE: ICD-10-CM

## 2021-10-12 PROCEDURE — 70220 X-RAY EXAM OF SINUSES: CPT | Mod: TC,RHCUB | Performed by: INTERNAL MEDICINE

## 2021-10-12 PROCEDURE — 99214 OFFICE O/P EST MOD 30 MIN: CPT | Mod: ,,, | Performed by: INTERNAL MEDICINE

## 2021-10-12 PROCEDURE — 99214 PR OFFICE/OUTPT VISIT, EST, LEVL IV, 30-39 MIN: ICD-10-PCS | Mod: ,,, | Performed by: INTERNAL MEDICINE

## 2021-10-12 RX ORDER — EZETIMIBE 10 MG/1
10 TABLET ORAL DAILY
Qty: 90 TABLET | Refills: 1 | Status: SHIPPED | OUTPATIENT
Start: 2021-10-12 | End: 2022-02-14 | Stop reason: SDUPTHER

## 2021-10-12 RX ORDER — APIXABAN 2.5 MG/1
2.5 TABLET, FILM COATED ORAL 2 TIMES DAILY
Qty: 60 TABLET | Refills: 3 | Status: SHIPPED | OUTPATIENT
Start: 2021-10-12 | End: 2022-02-14

## 2021-10-12 RX ORDER — CYCLOSPORINE 0.5 MG/ML
1 EMULSION OPHTHALMIC 2 TIMES DAILY
Qty: 60 EACH | Refills: 2 | Status: SHIPPED | OUTPATIENT
Start: 2021-10-12 | End: 2022-01-13

## 2021-10-12 RX ORDER — AMITRIPTYLINE HYDROCHLORIDE 25 MG/1
25 TABLET, FILM COATED ORAL DAILY
Qty: 180 TABLET | Refills: 1 | Status: SHIPPED | OUTPATIENT
Start: 2021-10-12 | End: 2022-10-18 | Stop reason: SDUPTHER

## 2021-10-12 RX ORDER — ATORVASTATIN CALCIUM 80 MG/1
80 TABLET, FILM COATED ORAL DAILY
Qty: 90 TABLET | Refills: 1 | Status: SHIPPED | OUTPATIENT
Start: 2021-10-12 | End: 2022-02-14 | Stop reason: SDUPTHER

## 2021-10-12 RX ORDER — ICOSAPENT ETHYL 1000 MG/1
2 CAPSULE ORAL 2 TIMES DAILY
Qty: 60 CAPSULE | Refills: 3 | Status: SHIPPED | OUTPATIENT
Start: 2021-10-12 | End: 2022-02-14 | Stop reason: SDUPTHER

## 2021-10-12 RX ORDER — AZITHROMYCIN 250 MG/1
TABLET, FILM COATED ORAL
Qty: 6 TABLET | Refills: 0 | Status: SHIPPED | OUTPATIENT
Start: 2021-10-12 | End: 2021-10-17

## 2021-10-12 RX ORDER — PREGABALIN 200 MG/1
CAPSULE ORAL
Qty: 180 CAPSULE | Refills: 1 | Status: CANCELLED | OUTPATIENT
Start: 2021-10-12

## 2021-10-12 RX ORDER — PROMETHAZINE HYDROCHLORIDE 50 MG/1
50 TABLET ORAL
Qty: 20 TABLET | Refills: 1 | Status: SHIPPED | OUTPATIENT
Start: 2021-10-12 | End: 2022-02-14 | Stop reason: SDUPTHER

## 2021-10-12 RX ORDER — ESZOPICLONE 3 MG/1
3 TABLET, FILM COATED ORAL NIGHTLY
Qty: 90 TABLET | Refills: 1 | Status: SHIPPED | OUTPATIENT
Start: 2021-10-12 | End: 2021-12-09 | Stop reason: SDUPTHER

## 2021-10-12 RX ORDER — DEXLANSOPRAZOLE 60 MG/1
1 CAPSULE, DELAYED RELEASE ORAL DAILY
Qty: 30 CAPSULE | Refills: 5 | Status: SHIPPED | OUTPATIENT
Start: 2021-10-12 | End: 2022-02-14 | Stop reason: SDUPTHER

## 2021-10-12 RX ORDER — METOPROLOL SUCCINATE 25 MG/1
12.5 TABLET, EXTENDED RELEASE ORAL 2 TIMES DAILY
Qty: 90 TABLET | Refills: 1 | Status: SHIPPED | OUTPATIENT
Start: 2021-10-12 | End: 2022-02-14 | Stop reason: SDUPTHER

## 2021-10-12 RX ORDER — GLIMEPIRIDE 2 MG/1
2 TABLET ORAL
Qty: 90 TABLET | Refills: 1 | Status: SHIPPED | OUTPATIENT
Start: 2021-10-12 | End: 2022-02-14 | Stop reason: SDUPTHER

## 2021-10-12 RX ORDER — CLOPIDOGREL BISULFATE 75 MG/1
75 TABLET ORAL DAILY
Qty: 90 TABLET | Refills: 1 | Status: SHIPPED | OUTPATIENT
Start: 2021-10-12 | End: 2022-02-14 | Stop reason: SDUPTHER

## 2021-10-12 RX ORDER — PIOGLITAZONEHYDROCHLORIDE 15 MG/1
15 TABLET ORAL DAILY
Qty: 90 TABLET | Refills: 1 | Status: SHIPPED | OUTPATIENT
Start: 2021-10-12 | End: 2022-02-14 | Stop reason: SDUPTHER

## 2021-10-14 DIAGNOSIS — G47.30 SLEEP APNEA, UNSPECIFIED: Primary | ICD-10-CM

## 2021-10-18 ENCOUNTER — TELEPHONE (OUTPATIENT)
Dept: FAMILY MEDICINE | Facility: CLINIC | Age: 56
End: 2021-10-18

## 2021-10-18 DIAGNOSIS — E11.69 TYPE 2 DIABETES MELLITUS WITH OTHER SPECIFIED COMPLICATION, WITHOUT LONG-TERM CURRENT USE OF INSULIN: Primary | ICD-10-CM

## 2021-10-20 ENCOUNTER — HOSPITAL ENCOUNTER (OUTPATIENT)
Dept: RADIOLOGY | Facility: HOSPITAL | Age: 56
Discharge: HOME | End: 2021-10-20
Attending: NURSE PRACTITIONER
Payer: MEDICARE

## 2021-10-20 DIAGNOSIS — G89.29 CHRONIC INTRACTABLE HEADACHE, UNSPECIFIED HEADACHE TYPE: ICD-10-CM

## 2021-10-20 DIAGNOSIS — R51.9 CHRONIC INTRACTABLE HEADACHE, UNSPECIFIED HEADACHE TYPE: ICD-10-CM

## 2021-10-20 PROCEDURE — 70450 CT HEAD WITHOUT CONTRAST: ICD-10-PCS | Mod: 26,,, | Performed by: RADIOLOGY

## 2021-10-20 PROCEDURE — 70450 CT HEAD/BRAIN W/O DYE: CPT | Mod: TC

## 2021-10-20 PROCEDURE — 70450 CT HEAD/BRAIN W/O DYE: CPT | Mod: 26,,, | Performed by: RADIOLOGY

## 2021-10-21 ENCOUNTER — CLINICAL SUPPORT (OUTPATIENT)
Dept: REHABILITATION | Facility: HOSPITAL | Age: 56
End: 2021-10-21
Attending: OTOLARYNGOLOGY
Payer: MEDICARE

## 2021-10-21 ENCOUNTER — TELEPHONE (OUTPATIENT)
Dept: NEUROLOGY | Facility: CLINIC | Age: 56
End: 2021-10-21

## 2021-10-21 ENCOUNTER — HOSPITAL ENCOUNTER (OUTPATIENT)
Dept: RADIOLOGY | Facility: HOSPITAL | Age: 56
Discharge: HOME | End: 2021-10-21
Attending: OTOLARYNGOLOGY
Payer: MEDICARE

## 2021-10-21 DIAGNOSIS — R13.10 DYSPHAGIA, UNSPECIFIED TYPE: ICD-10-CM

## 2021-10-21 DIAGNOSIS — R13.14 DYSPHAGIA, PHARYNGOESOPHAGEAL: ICD-10-CM

## 2021-10-21 DIAGNOSIS — R13.14 PHARYNGOESOPHAGEAL DYSPHAGIA: ICD-10-CM

## 2021-10-21 PROCEDURE — 74230 X-RAY XM SWLNG FUNCJ C+: CPT | Mod: TC

## 2021-10-21 PROCEDURE — 92611 MOTION FLUOROSCOPY/SWALLOW: CPT

## 2021-11-17 DIAGNOSIS — R13.10 DYSPHAGIA: Primary | ICD-10-CM

## 2021-11-18 ENCOUNTER — OFFICE VISIT (OUTPATIENT)
Dept: SURGERY | Facility: CLINIC | Age: 56
End: 2021-11-18
Payer: MEDICARE

## 2021-11-18 ENCOUNTER — HOSPITAL ENCOUNTER (OUTPATIENT)
Dept: RADIOLOGY | Facility: HOSPITAL | Age: 56
Discharge: HOME | End: 2021-11-18
Payer: MEDICARE

## 2021-11-18 VITALS — HEIGHT: 67 IN | WEIGHT: 208 LBS | HEART RATE: 97 BPM | BODY MASS INDEX: 32.65 KG/M2 | OXYGEN SATURATION: 98 %

## 2021-11-18 DIAGNOSIS — I73.9 PVD (PERIPHERAL VASCULAR DISEASE): Primary | ICD-10-CM

## 2021-11-18 DIAGNOSIS — Z12.31 VISIT FOR SCREENING MAMMOGRAM: ICD-10-CM

## 2021-11-18 DIAGNOSIS — G43.909 ACUTE CONFUSIONAL MIGRAINE: Primary | ICD-10-CM

## 2021-11-18 PROCEDURE — 99213 PR OFFICE/OUTPT VISIT, EST, LEVL III, 20-29 MIN: ICD-10-PCS | Mod: S$PBB,,, | Performed by: SURGERY

## 2021-11-18 PROCEDURE — 77067 SCR MAMMO BI INCL CAD: CPT | Mod: TC

## 2021-11-18 PROCEDURE — 99215 OFFICE O/P EST HI 40 MIN: CPT | Mod: PBBFAC | Performed by: SURGERY

## 2021-11-18 PROCEDURE — 99213 OFFICE O/P EST LOW 20 MIN: CPT | Mod: S$PBB,,, | Performed by: SURGERY

## 2021-11-18 RX ORDER — UBROGEPANT 100 MG/1
100 TABLET ORAL ONCE AS NEEDED
Qty: 12 TABLET | Refills: 2 | Status: SHIPPED | OUTPATIENT
Start: 2021-11-18 | End: 2021-11-18

## 2021-12-02 ENCOUNTER — OFFICE VISIT (OUTPATIENT)
Dept: CARDIOLOGY | Facility: CLINIC | Age: 56
End: 2021-12-02
Payer: MEDICARE

## 2021-12-02 VITALS
HEART RATE: 93 BPM | DIASTOLIC BLOOD PRESSURE: 70 MMHG | WEIGHT: 209 LBS | OXYGEN SATURATION: 98 % | HEIGHT: 67 IN | SYSTOLIC BLOOD PRESSURE: 138 MMHG | BODY MASS INDEX: 32.8 KG/M2

## 2021-12-02 DIAGNOSIS — I77.89 OTHER SPECIFIED DISORDERS OF ARTERIES AND ARTERIOLES: ICD-10-CM

## 2021-12-02 DIAGNOSIS — E78.2 MIXED HYPERLIPIDEMIA: ICD-10-CM

## 2021-12-02 DIAGNOSIS — G43.909 ACUTE CONFUSIONAL MIGRAINE: Primary | ICD-10-CM

## 2021-12-02 DIAGNOSIS — I77.9 CAROTID ARTERY DISEASE, UNSPECIFIED LATERALITY, UNSPECIFIED TYPE: ICD-10-CM

## 2021-12-02 DIAGNOSIS — I10 ESSENTIAL HYPERTENSION: Primary | ICD-10-CM

## 2021-12-02 DIAGNOSIS — I25.110 ATHEROSCLEROSIS OF NATIVE CORONARY ARTERY OF NATIVE HEART WITH UNSTABLE ANGINA PECTORIS: ICD-10-CM

## 2021-12-02 PROCEDURE — 99215 OFFICE O/P EST HI 40 MIN: CPT | Mod: PBBFAC | Performed by: INTERNAL MEDICINE

## 2021-12-02 PROCEDURE — 99214 PR OFFICE/OUTPT VISIT, EST, LEVL IV, 30-39 MIN: ICD-10-PCS | Mod: S$PBB,,, | Performed by: INTERNAL MEDICINE

## 2021-12-02 PROCEDURE — 93010 ELECTROCARDIOGRAM REPORT: CPT | Mod: S$PBB,,, | Performed by: INTERNAL MEDICINE

## 2021-12-02 PROCEDURE — 93005 ELECTROCARDIOGRAM TRACING: CPT | Mod: PBBFAC | Performed by: INTERNAL MEDICINE

## 2021-12-02 PROCEDURE — 99214 OFFICE O/P EST MOD 30 MIN: CPT | Mod: S$PBB,,, | Performed by: INTERNAL MEDICINE

## 2021-12-02 PROCEDURE — 93010 EKG 12-LEAD: ICD-10-PCS | Mod: S$PBB,,, | Performed by: INTERNAL MEDICINE

## 2021-12-02 RX ORDER — UBROGEPANT 100 MG/1
100 TABLET ORAL ONCE AS NEEDED
Qty: 30 TABLET | Refills: 2 | Status: SHIPPED | OUTPATIENT
Start: 2021-12-02 | End: 2021-12-02

## 2021-12-07 ENCOUNTER — PROCEDURE VISIT (OUTPATIENT)
Dept: SLEEP MEDICINE | Facility: HOSPITAL | Age: 56
End: 2021-12-07
Attending: INTERNAL MEDICINE
Payer: MEDICARE

## 2021-12-07 DIAGNOSIS — G47.30 SLEEP APNEA, UNSPECIFIED: ICD-10-CM

## 2021-12-07 PROCEDURE — 95810 POLYSOM 6/> YRS 4/> PARAM: CPT | Mod: PO

## 2021-12-08 DIAGNOSIS — G47.33 OSA (OBSTRUCTIVE SLEEP APNEA): Primary | ICD-10-CM

## 2021-12-09 RX ORDER — ESZOPICLONE 3 MG/1
3 TABLET, FILM COATED ORAL NIGHTLY
Qty: 90 TABLET | Refills: 1 | Status: SHIPPED | OUTPATIENT
Start: 2021-12-09 | End: 2022-02-14 | Stop reason: SDUPTHER

## 2021-12-13 ENCOUNTER — HOSPITAL ENCOUNTER (OUTPATIENT)
Dept: RADIOLOGY | Facility: HOSPITAL | Age: 56
Discharge: HOME | End: 2021-12-13
Attending: INTERNAL MEDICINE
Payer: MEDICARE

## 2021-12-13 DIAGNOSIS — I77.89 OTHER SPECIFIED DISORDERS OF ARTERIES AND ARTERIOLES: ICD-10-CM

## 2021-12-13 DIAGNOSIS — I77.9 CAROTID ARTERY DISEASE, UNSPECIFIED LATERALITY, UNSPECIFIED TYPE: ICD-10-CM

## 2021-12-13 PROCEDURE — 93880 EXTRACRANIAL BILAT STUDY: CPT | Mod: 26,,, | Performed by: SURGERY

## 2021-12-13 PROCEDURE — 93880 EXTRACRANIAL BILAT STUDY: CPT | Mod: TC

## 2021-12-13 PROCEDURE — 93880 US CAROTID BILATERAL: ICD-10-PCS | Mod: 26,,, | Performed by: SURGERY

## 2021-12-17 ENCOUNTER — TELEPHONE (OUTPATIENT)
Dept: FAMILY MEDICINE | Facility: CLINIC | Age: 56
End: 2021-12-17
Payer: MEDICARE

## 2021-12-21 DIAGNOSIS — E11.69 TYPE 2 DIABETES MELLITUS WITH OTHER SPECIFIED COMPLICATION, WITHOUT LONG-TERM CURRENT USE OF INSULIN: ICD-10-CM

## 2021-12-23 DIAGNOSIS — Z01.818 PRE-OP TESTING: ICD-10-CM

## 2022-01-04 ENCOUNTER — HOSPITAL ENCOUNTER (OUTPATIENT)
Dept: GASTROENTEROLOGY | Facility: HOSPITAL | Age: 57
Discharge: HOME | End: 2022-01-04
Attending: INTERNAL MEDICINE
Payer: MEDICARE

## 2022-01-04 VITALS
TEMPERATURE: 97 F | HEART RATE: 83 BPM | DIASTOLIC BLOOD PRESSURE: 47 MMHG | WEIGHT: 207 LBS | BODY MASS INDEX: 32.42 KG/M2 | OXYGEN SATURATION: 98 % | SYSTOLIC BLOOD PRESSURE: 121 MMHG | RESPIRATION RATE: 18 BRPM

## 2022-01-04 DIAGNOSIS — K90.0 CELIAC DISEASE: ICD-10-CM

## 2022-01-04 DIAGNOSIS — R13.10 DYSPHAGIA: ICD-10-CM

## 2022-01-04 DIAGNOSIS — K29.00 ACUTE SUPERFICIAL GASTRITIS WITHOUT HEMORRHAGE: ICD-10-CM

## 2022-01-04 DIAGNOSIS — R13.19 ESOPHAGEAL DYSPHAGIA: ICD-10-CM

## 2022-01-04 LAB — GLUCOSE SERPL-MCNC: 96 MG/DL (ref 70–110)

## 2022-01-04 PROCEDURE — 88342 IMHCHEM/IMCYTCHM 1ST ANTB: CPT | Mod: 26,,, | Performed by: PATHOLOGY

## 2022-01-04 PROCEDURE — C1889 IMPLANT/INSERT DEVICE, NOC: HCPCS

## 2022-01-04 PROCEDURE — 88305 TISSUE EXAM BY PATHOLOGIST: CPT | Mod: 26,,, | Performed by: PATHOLOGY

## 2022-01-04 PROCEDURE — 82962 GLUCOSE BLOOD TEST: CPT

## 2022-01-04 PROCEDURE — 63600175 PHARM REV CODE 636 W HCPCS: Performed by: INTERNAL MEDICINE

## 2022-01-04 PROCEDURE — 88342 SURGICAL PATHOLOGY: ICD-10-PCS | Mod: 26,,, | Performed by: PATHOLOGY

## 2022-01-04 PROCEDURE — 88305 SURGICAL PATHOLOGY: ICD-10-PCS | Mod: 26,XU,, | Performed by: PATHOLOGY

## 2022-01-04 PROCEDURE — 27201423 OPTIME MED/SURG SUP & DEVICES STERILE SUPPLY

## 2022-01-04 PROCEDURE — 43450 DILATE ESOPHAGUS 1/MULT PASS: CPT

## 2022-01-04 PROCEDURE — 88305 TISSUE EXAM BY PATHOLOGIST: CPT | Mod: SUR | Performed by: INTERNAL MEDICINE

## 2022-01-04 PROCEDURE — 43239 EGD BIOPSY SINGLE/MULTIPLE: CPT

## 2022-01-04 RX ORDER — SODIUM CHLORIDE 0.9 % (FLUSH) 0.9 %
10 SYRINGE (ML) INJECTION
Status: CANCELLED | OUTPATIENT
Start: 2022-01-04

## 2022-01-04 RX ORDER — MIDAZOLAM HYDROCHLORIDE 1 MG/ML
INJECTION INTRAMUSCULAR; INTRAVENOUS
Status: COMPLETED | OUTPATIENT
Start: 2022-01-04 | End: 2022-01-04

## 2022-01-04 RX ORDER — MEPERIDINE HYDROCHLORIDE 100 MG/ML
INJECTION INTRAMUSCULAR; INTRAVENOUS; SUBCUTANEOUS
Status: COMPLETED | OUTPATIENT
Start: 2022-01-04 | End: 2022-01-04

## 2022-01-04 RX ADMIN — Medication 25 MG: at 10:01

## 2022-01-04 RX ADMIN — MIDAZOLAM HYDROCHLORIDE 2 MG: 1 INJECTION, SOLUTION INTRAMUSCULAR; INTRAVENOUS at 10:01

## 2022-01-04 RX ADMIN — MIDAZOLAM HYDROCHLORIDE 4 MG: 1 INJECTION, SOLUTION INTRAMUSCULAR; INTRAVENOUS at 10:01

## 2022-01-04 NOTE — H&P
[facility]  Gastroenterology  H&P    Patient Name: Anuradha Godfrey  MRN: [id number]  Admission Date: 1/4/2022  Code Status: No Order    Attending Provider: Ronald Benitez MD  Primary Care Physician: Munir Garcia MD  Principal Problem:<principal problem not specified>    Subjective:     History of Present Illness: Pt c/o esophageal dysphagia; for egd with dilation. She's off of anticoagulation.    Past Medical History:   Diagnosis Date    AAA (abdominal aortic aneurysm) 08/18/2014    Anemia 05/16/2018    Anxiety state 07/21/2015    Celiac disease 02/29/2016    Chest pain 08/05/2014    Coronary arteriosclerosis 12/18/2018    Depressive disorder 08/18/2014    Diabetes mellitus     Diastolic dysfunction 08/14/2018    Embolism and thrombosis of arteries of extremities 10/15/2015    Gastroesophageal reflux disease without esophagitis 07/18/2017    History of myocardial infarction 07/13/2014    Hypercholesterolemia 01/18/2016    Hypertension 07/15/2020    Insufficiency, arterial, peripheral 08/01/2019    Multi-vessel coronary artery stenosis 08/01/2019    Occlusion and stenosis of unspecified carotid artery 07/26/2019    Peripheral arterial occlusive disease 12/15/2016    Peripheral vascular disease 11/12/2020    Venous insufficiency (chronic) (peripheral) 10/15/2015       Past Surgical History:   Procedure Laterality Date    APPENDECTOMY Right     CARDIAC CATHETERIZATION  02/04/2021    PCI to prox LAD; IVUS of LAD    CHOLECYSTECTOMY      OOPHORECTOMY      TOTAL ABDOMINAL HYSTERECTOMY         Review of patient's allergies indicates:   Allergen Reactions    Bee sting [allergen ext-venom-honey bee] Anaphylaxis    Nsaids (non-steroidal anti-inflammatory drug) Anaphylaxis    Neurontin [gabapentin] Hallucinations     Family History     Problem Relation (Age of Onset)    Heart disease Mother, Brother    Stroke Mother        Tobacco Use    Smoking status: Never Smoker    Smokeless  tobacco: Never Used   Substance and Sexual Activity    Alcohol use: Not Currently    Drug use: Never    Sexual activity: Not Currently     Review of Systems   Respiratory: Negative.    Cardiovascular: Negative.    Gastrointestinal: Negative.      Objective:     Vital Signs (Most Recent):  Temp: 97.3 °F (36.3 °C) (01/04/22 0940)  Pulse: 91 (01/04/22 0940)  Resp: (!) 25 (01/04/22 0940)  BP: (!) 156/74 (01/04/22 0940)  SpO2: 98 % (01/04/22 0940) Vital Signs (24h Range):  Temp:  [97.3 °F (36.3 °C)] 97.3 °F (36.3 °C)  Pulse:  [91] 91  Resp:  [25] 25  SpO2:  [98 %] 98 %  BP: (156)/(74) 156/74     Weight: 93.9 kg (207 lb) (01/04/22 0940)  Body mass index is 32.42 kg/m².    No intake or output data in the 24 hours ending 01/04/22 1034    Lines/Drains/Airways     Peripheral Intravenous Line                 Peripheral IV - Single Lumen 01/04/22 0954 22 G Anterior;Right Wrist <1 day                Physical Exam  Vitals reviewed.   Constitutional:       General: She is not in acute distress.     Appearance: Normal appearance. She is well-developed. She is not ill-appearing.   HENT:      Head: Normocephalic and atraumatic.      Nose: Nose normal.   Eyes:      Pupils: Pupils are equal, round, and reactive to light.   Cardiovascular:      Rate and Rhythm: Normal rate and regular rhythm.   Pulmonary:      Effort: Pulmonary effort is normal.      Breath sounds: Normal breath sounds. No wheezing.   Abdominal:      General: Abdomen is flat. Bowel sounds are normal. There is no distension.      Palpations: Abdomen is soft.      Tenderness: There is no abdominal tenderness. There is no guarding.   Skin:     General: Skin is warm and dry.      Coloration: Skin is not jaundiced.   Neurological:      Mental Status: She is alert.   Psychiatric:         Attention and Perception: Attention normal.         Mood and Affect: Affect normal.         Speech: Speech normal.         Behavior: Behavior is cooperative.      Comments: Pt was calm  while speaking.         Significant Labs:  CBC: No results for input(s): WBC, HGB, HCT, PLT in the last 48 hours.  CMP: No results for input(s): GLU, CALCIUM, ALBUMIN, PROT, NA, K, CO2, CL, BUN, CREATININE, ALKPHOS, ALT, AST, BILITOT in the last 48 hours.    Significant Imaging:  Imaging results within the past 24 hours have been reviewed.    Assessment/Plan:     There are no hospital problems to display for this patient.        Imp: esophageal dysphagia  Plan: egd with dilation    Ronald Benitez MD  Gastroenterology  [facility]

## 2022-01-04 NOTE — DISCHARGE INSTRUCTIONS
Procedure Date  1/4/22     Impression  Overall Impression: Mucosa of the esophagus was normal. Biopsies were obtained and the esophagus was dilated with a 48F Bed[place]. The stomach had intense erythema of the antrum and biopsies were obtained. Mucosa of the duodenum appeared flattened, consistent with hx of celiac, biopsies were obtained.  RN gave IV sedation under my orders.     Recommendation  Await pathology results; avoid gluten and nsaids; ppi 1/AM; repeat dilation prn.     DO NOT DRIVE, OPERATE MACHINERY, OR SIGN LEGAL DOCUMENTS UNTIL TOMORROW. DRINK PLENTY OF FLUIDS. CALL [facility] FOR QUESTIONS/CONCERNS.

## 2022-01-05 ENCOUNTER — OFFICE VISIT (OUTPATIENT)
Dept: FAMILY MEDICINE | Facility: CLINIC | Age: 57
End: 2022-01-05
Payer: MEDICARE

## 2022-01-05 ENCOUNTER — TELEPHONE (OUTPATIENT)
Dept: GASTROENTEROLOGY | Facility: CLINIC | Age: 57
End: 2022-01-05
Payer: MEDICARE

## 2022-01-05 ENCOUNTER — APPOINTMENT (OUTPATIENT)
Dept: RADIOLOGY | Facility: CLINIC | Age: 57
End: 2022-01-05
Attending: INTERNAL MEDICINE
Payer: MEDICARE

## 2022-01-05 VITALS
HEART RATE: 103 BPM | DIASTOLIC BLOOD PRESSURE: 86 MMHG | RESPIRATION RATE: 20 BRPM | HEIGHT: 67 IN | SYSTOLIC BLOOD PRESSURE: 168 MMHG | OXYGEN SATURATION: 95 % | BODY MASS INDEX: 31.39 KG/M2 | WEIGHT: 200 LBS

## 2022-01-05 DIAGNOSIS — J32.9 SINUSITIS, UNSPECIFIED CHRONICITY, UNSPECIFIED LOCATION: ICD-10-CM

## 2022-01-05 DIAGNOSIS — N39.0 URINARY TRACT INFECTION WITHOUT HEMATURIA, SITE UNSPECIFIED: Primary | ICD-10-CM

## 2022-01-05 LAB
BILIRUB UR QL STRIP: NEGATIVE
CLARITY UR: CLEAR
COLOR UR: YELLOW
DHEA SERPL-MCNC: NORMAL
ESTROGEN SERPL-MCNC: NORMAL PG/ML
GLUCOSE UR STRIP-MCNC: 250 MG/DL
KETONES UR STRIP-SCNC: NEGATIVE MG/DL
LAB AP GROSS DESCRIPTION: NORMAL
LAB AP LABORATORY NOTES: NORMAL
LEUKOCYTE ESTERASE UR QL STRIP: NEGATIVE
NITRITE UR QL STRIP: NEGATIVE
PH UR STRIP: 6.5 PH UNITS
PROT UR QL STRIP: NEGATIVE
RBC # UR STRIP: NEGATIVE /UL
SP GR UR STRIP: 1.01
T3RU NFR SERPL: NORMAL %
UROBILINOGEN UR STRIP-ACNC: 0.2 MG/DL

## 2022-01-05 PROCEDURE — 99214 PR OFFICE/OUTPT VISIT, EST, LEVL IV, 30-39 MIN: ICD-10-PCS | Mod: ,,, | Performed by: INTERNAL MEDICINE

## 2022-01-05 PROCEDURE — 81003 URINALYSIS, REFLEX TO URINE CULTURE: ICD-10-PCS | Mod: QW,,, | Performed by: CLINICAL MEDICAL LABORATORY

## 2022-01-05 PROCEDURE — 99214 OFFICE O/P EST MOD 30 MIN: CPT | Mod: ,,, | Performed by: INTERNAL MEDICINE

## 2022-01-05 PROCEDURE — 70220 X-RAY EXAM OF SINUSES: CPT | Mod: TC,RHCUB | Performed by: INTERNAL MEDICINE

## 2022-01-05 PROCEDURE — 81003 URINALYSIS AUTO W/O SCOPE: CPT | Mod: QW,,, | Performed by: CLINICAL MEDICAL LABORATORY

## 2022-01-05 RX ORDER — LORATADINE PSEUDOEPHEDRINE SULFATE 10; 240 MG/1; MG/1
1 TABLET, EXTENDED RELEASE ORAL DAILY
Qty: 7 TABLET | Refills: 0 | COMMUNITY
Start: 2022-01-05 | End: 2022-01-15

## 2022-01-05 RX ORDER — AMOXICILLIN AND CLAVULANATE POTASSIUM 500; 125 MG/1; MG/1
1 TABLET, FILM COATED ORAL 2 TIMES DAILY
Qty: 20 TABLET | Refills: 0 | Status: SHIPPED | OUTPATIENT
Start: 2022-01-05 | End: 2022-02-14

## 2022-01-05 NOTE — TELEPHONE ENCOUNTER
Called EGD path results and recommendations. Instructed patient to continue gluten free diet. Patient verbalized understanding.    ----- Message from Ronald Benitez MD sent at 1/5/2022  1:46 PM CST -----  Egd bx shows gastritis. The duodenum biopsy does not confirm celiac disease, although that dx is listed on her record.

## 2022-01-06 NOTE — PROGRESS NOTES
Subjective:       Patient ID: Anuradha Godfrey is a 56 y.o. female.    Chief Complaint: Urinary Tract Infection, Sinus Problem (C/o sinus pressure.), Headache, and Oral Pain (C/o soreness in gums. )    HPI patient seen on 01/05/2022 patient is awake alert patient in no acute distress patient complains of a sinus headache patient complains of facial pain patient complains of dysuria and abdominal discomfort patient denies nausea vomiting diarrhea    Vitals reviewed    Lungs are clear no crackles or wheezes the patient's my axillary sinus tenderness to palpation cardiovascular S1-S2 regular rate rhythm abdomen is soft positive bowel sounds nontender extremities nonedematous neuro nonfocal assessment plan UTI diabetes sinusitis the planned urinalysis x-ray her sinuses start Claritin also because the patient does have some postnasal drip and also start Augmentin 500 mg 1 p.o. b.i.d. to treat the urinary tract infection and the suspected sinusitis  Review of Systems      Objective:      Physical Exam    Assessment:       Problem List Items Addressed This Visit    None     Visit Diagnoses     Urinary tract infection without hematuria, site unspecified    -  Primary    Relevant Orders    Urinalysis, Reflex to Urine Culture Urine, Clean Catch (Completed)    Sinusitis, unspecified chronicity, unspecified location        Relevant Orders    X-Ray Sinuses Min 3 Views (Completed)          Plan:

## 2022-01-10 ENCOUNTER — OFFICE VISIT (OUTPATIENT)
Dept: NEUROLOGY | Facility: CLINIC | Age: 57
End: 2022-01-10
Payer: MEDICARE

## 2022-01-10 VITALS
DIASTOLIC BLOOD PRESSURE: 84 MMHG | BODY MASS INDEX: 31.39 KG/M2 | RESPIRATION RATE: 18 BRPM | SYSTOLIC BLOOD PRESSURE: 136 MMHG | OXYGEN SATURATION: 97 % | WEIGHT: 200 LBS | HEART RATE: 88 BPM | HEIGHT: 67 IN

## 2022-01-10 DIAGNOSIS — G43.719 INTRACTABLE CHRONIC MIGRAINE WITHOUT AURA AND WITHOUT STATUS MIGRAINOSUS: Primary | ICD-10-CM

## 2022-01-10 DIAGNOSIS — R41.3 MEMORY IMPAIRMENT: ICD-10-CM

## 2022-01-10 DIAGNOSIS — E11.42 DIABETIC PERIPHERAL NEUROPATHY: ICD-10-CM

## 2022-01-10 PROBLEM — G43.709 CHRONIC MIGRAINE WITHOUT AURA: Status: ACTIVE | Noted: 2021-09-24

## 2022-01-10 PROCEDURE — 99213 OFFICE O/P EST LOW 20 MIN: CPT | Mod: S$PBB,,, | Performed by: NURSE PRACTITIONER

## 2022-01-10 PROCEDURE — 99213 PR OFFICE/OUTPT VISIT, EST, LEVL III, 20-29 MIN: ICD-10-PCS | Mod: S$PBB,,, | Performed by: NURSE PRACTITIONER

## 2022-01-10 PROCEDURE — 99213 OFFICE O/P EST LOW 20 MIN: CPT | Mod: PBBFAC | Performed by: NURSE PRACTITIONER

## 2022-01-10 NOTE — PATIENT INSTRUCTIONS
1. Continue Ubrelvy as directed as needed for migraines    2. Keep follow-up with opthamology as scheduled    3. Keep follow-up with sleep clinic    4. Strongly encourage to keep strict control of glucose.

## 2022-01-10 NOTE — PROGRESS NOTES
Subjective:       Patient ID: Anuradha Godfrey is a 56 y.o. female     Chief Complaint:    Chief Complaint   Patient presents with    Follow-up    Migraine        Allergies:  Bee sting [allergen ext-venom-honey bee], Nsaids (non-steroidal anti-inflammatory drug), Neurontin [gabapentin], and Topiramate    Current Medications:    Outpatient Encounter Medications as of 1/10/2022   Medication Sig Dispense Refill    amitriptyline (ELAVIL) 25 MG tablet Take 1 tablet (25 mg total) by mouth once daily. 180 tablet 1    aspirin (ECOTRIN) 81 MG EC tablet Take 81 mg by mouth.      atorvastatin (LIPITOR) 80 MG tablet Take 1 tablet (80 mg total) by mouth once daily. 90 tablet 1    blood sugar diagnostic Strp Test blood sugar  each 11    blood sugar diagnostic Strp 1 strip by Misc.(Non-Drug; Combo Route) route 3 (three) times daily. 200 strip 3    clopidogreL (PLAVIX) 75 mg tablet Take 1 tablet (75 mg total) by mouth once daily. 90 tablet 1    dexlansoprazole (DEXILANT) 60 mg capsule Take 1 capsule (60 mg total) by mouth once daily. 30 capsule 5    docusate sodium (COLACE) 50 MG capsule Take by mouth.      ELIQUIS 2.5 mg Tab Take 1 tablet (2.5 mg total) by mouth 2 (two) times daily. 60 tablet 3    eszopiclone (LUNESTA) 3 mg Tab Take 1 tablet (3 mg total) by mouth every evening. 90 tablet 1    ezetimibe (ZETIA) 10 mg tablet Take 1 tablet (10 mg total) by mouth once daily. 90 tablet 1    fosinopriL (MONOPRIL) 10 MG Tab Take 1 tablet (10 mg total) by mouth once daily. 90 tablet 1    glimepiride (AMARYL) 2 MG tablet Take 1 tablet (2 mg total) by mouth daily with breakfast. 90 tablet 1    HYDROcodone-acetaminophen (NORCO)  mg per tablet TAKE 1 TABLET BY MOUTH 4 TIMES DAILY AS NEEDED ..MAY CAUSE DROWSINESS- AVOID ALCOHOL      icosapent ethyL (VASCEPA) 1 gram Cap Take 2 capsules (2 g total) by mouth 2 (two) times daily. 60 capsule 3    insulin glargine (LANTUS) 100 unit/mL injection Inject 35 Units into  the skin once daily. 30 mL 3    insulin NPH-insulin regular, 70/30, (NOVOLIN 70/30) 100 unit/mL (70-30) injection Inject 1 Units into the skin 2 (two) times daily. 1 vial 3    loratadine-pseudoephedrine  mg (CLARITIN-D 24 HOUR)  mg per 24 hr tablet Take 1 tablet by mouth once daily. for 10 days 7 tablet 0    methocarbamoL (ROBAXIN) 500 MG Tab TAKE 1 TABLET BY MOUTH 3 TIMES DAILY WITH FOOD AS NEEDED ..MAY CAUSE DROWSINESS- AVOID ALCOHOL      metoprolol succinate (TOPROL-XL) 25 MG 24 hr tablet Take 0.5 tablets (12.5 mg total) by mouth 2 (two) times daily. 90 tablet 1    morphine (MS CONTIN) 15 MG 12 hr tablet TAKE 1 TABLET BY MOUTH EACH NIGHT AT BEDTIME ..MAY CAUSE DROWSINESS- AVOID ALCOHOL      nitroGLYCERIN (NITROSTAT) 0.4 MG SL tablet Place 1 tablet (0.4 mg total) under the tongue as needed for Chest pain. 25 tablet 2    pioglitazone (ACTOS) 15 MG tablet Take 1 tablet (15 mg total) by mouth once daily. 90 tablet 1    pregabalin (LYRICA) 200 MG Cap TAKE 1 CAPSULE BY MOUTH 3 TIMES DAILY ..MAY CAUSE DROWSINESS- AVOID ALCOHOL      promethazine (PHENERGAN) 50 MG tablet Take 1 tablet (50 mg total) by mouth every 4 to 6 hours as needed for Nausea. 20 tablet 1    RESTASIS 0.05 % ophthalmic emulsion Place 1 drop into both eyes 2 (two) times daily. 60 each 2    amoxicillin-clavulanate 500-125mg (AUGMENTIN) 500-125 mg Tab Take 1 tablet (500 mg total) by mouth 2 (two) times daily. (Patient not taking: Reported on 1/10/2022) 20 tablet 0    azithromycin (Z-COURTNEY) 250 MG tablet Take 1 tablet (250 mg total) by mouth once daily. 2 tablets by mouth x 1 day then 1 tablet by mouth daily x 4days (Patient not taking: Reported on 1/10/2022) 6 tablet 0     No facility-administered encounter medications on file as of 1/10/2022.       History of Present Illness  57 y/o WF following in neurology for reported migraine headaches, neuropathy, and reported memory impairment.    Last visit we got her approval for Ubrelvy and  Rx was sent in.  This was to replace the prior imitrex which was d/c when noted history of CAD, followed by Dr. Peraza.  She reported has taken 3 total for headaches, she reports symptoms were improved when the medication was taken except reports latest headache did not seem to help as well.  She does have phenergan prescribed and was told she could use it if severe migraine.    Long standing history of migraines.  She was actually seen by Dr. Galo in 2000 through 2014 for her migraines.  She was prior treated with pamelor and topamax.  She had reaction to the topamax.  Pamelor was effective but somewhere along the way was changed to Elavil which she is still currently on, but this is for neuropathy per pain treatment.  She is already on beta blocker metoprolol for her HTN management.  We did try to get approval for once monthly injections as well.    Initial visit was reporting about 5 migraine episodes in a month, possibly lasting from 1 day to 7 days each so between 7 and 21 days in a month.  Rated as 9/10, n/v, photophobia and phonophobia.  Her records reflect in the past was also treated with zomig (zolmitriptan) without benefit.  She is already prescribed phenergan.  She denies overuse headaches.  Again, reports only 3 migraines in the 3 months since last visit, so this is good.    She sees Dr. Shah opthamology, but reported to me at initial visit it had been 2 years since last visit and with her poorly controlled diabetes I strongly encouraged her to follow with them.    She is poorly controlled diabetic per her report, and has LLE AKA amputation due this.    Initial MMSE was 29/30, was waiting to see how she improved with the sleep apnea management if needed, this is still ongoing.  Her CT head did not reflect any acute abnormalities.  She is also on multiple medications which can contribute to cognitive impairment including norco, MS contin, lyrica, and lunesta.  She has concern over alzheimer's reporting  several immediate family members with this diagnosis.  WE will refer her on to Dr. Aguilar in meantime for further evaluation.    She is continued follow with Dr. Shine and is due to follow with them further soon for C-pap evaluation.    Vitamin d was 21 in April 2021         Review of Systems  Review of Systems   Constitutional: Positive for malaise/fatigue. Negative for diaphoresis and fever.   HENT: Negative for congestion, hearing loss and tinnitus.    Eyes: Positive for blurred vision. Negative for double vision, photophobia, discharge and redness.   Respiratory: Negative for cough and shortness of breath.    Cardiovascular: Negative for chest pain.   Gastrointestinal: Negative for abdominal pain, nausea and vomiting.   Musculoskeletal: Negative for back pain, joint pain, myalgias and neck pain.   Skin: Negative for itching and rash.   Neurological: Positive for dizziness, sensory change, weakness and headaches. Negative for tremors, speech change, focal weakness, seizures and loss of consciousness.   Psychiatric/Behavioral: Positive for memory loss. Negative for depression, hallucinations and suicidal ideas. The patient has insomnia.    All other systems reviewed and are negative.     Objective:     NEUROLOGICAL EXAMINATION:     MENTAL STATUS   Oriented to person, place, and time.   Registration: recalls 3 of 3 objects. Recall at 5 minutes: recalls 3 of 3 objects.   Attention: normal. Concentration: normal.   Speech: speech is normal   Level of consciousness: alert  Knowledge: good and consistent with education.   Normal comprehension.     CRANIAL NERVES     CN II   Visual fields full to confrontation.   Visual acuity: normal  Right visual field deficit: none  Left visual field deficit: none     CN III, IV, VI   Pupils are equal, round, and reactive to light.  Extraocular motions are normal.   Right pupil: Size: 3 mm. Shape: regular. Reactivity: brisk. Consensual response: intact. Accommodation: intact.    Left pupil: Size: 3 mm. Shape: regular. Reactivity: brisk. Consensual response: intact. Accommodation: intact.   CN III: no CN III palsy  CN VI: no CN VI palsy  Nystagmus: none   Diplopia: none  Upgaze: normal  Downgaze: normal  Conjugate gaze: present  Vestibulo-ocular reflex: present    CN V   Facial sensation intact.   Right facial sensation deficit: none  Left facial sensation deficit: none  Right corneal reflex: normal  Left corneal reflex: normal    CN VII   Facial expression full, symmetric.   Right facial weakness: none  Left facial weakness: none  Right taste: normal  Left taste: normal    CN VIII   CN VIII normal.   Hearing: intact    CN IX, X   CN IX normal.   CN X normal.   Palate: symmetric    CN XI   CN XI normal.   Right sternocleidomastoid strength: normal  Left sternocleidomastoid strength: normal  Right trapezius strength: normal  Left trapezius strength: normal    CN XII   CN XII normal.   Tongue: not atrophic  Fasciculations: absent  Tongue deviation: none    MOTOR EXAM   Muscle bulk: normal  Overall muscle tone: normal  Right arm tone: normal  Left arm tone: normal  Right arm pronator drift: absent  Left arm pronator drift: absent  Right leg tone: normal    Strength   Right neck flexion: 5/5  Left neck flexion: 5/5  Right neck extension: 5/5  Left neck extension: 5/5  Right deltoid: 5/5  Left deltoid: 5/5  Right biceps: 5/5  Left biceps: 5/5  Right triceps: 5/5  Left triceps: 5/5  Right wrist flexion: 5/5  Left wrist flexion: 5/5  Right wrist extension: 5/5  Left wrist extension: 5/5  Right interossei: 5/5  Left interossei: 5/5  Right iliopsoas: 5/5  Right quadriceps: 5/5  Right hamstrin/5  Right anterior tibial: 5/5  Right posterior tibial: 5/5  Right gastroc: 5/5       Left AKA     REFLEXES     Reflexes   Right brachioradialis: 2+  Left brachioradialis: 2+  Right biceps: 2+  Left biceps: 2+  Right triceps: 2+  Left triceps: 2+  Right patellar: 2+  Left patellar: 2+  Right achilles:  2+  Left achilles: 2+  Right plantar: normal  Right Peoples: absent  Right ankle clonus: absent  Right pendular knee jerk: absent    SENSORY EXAM   Light touch normal.   Right arm light touch: normal  Left arm light touch: normal  Right leg light touch: normal  Vibration normal.   Right arm vibration: normal  Left arm vibration: normal  Right leg vibration: normal  Proprioception normal.   Right arm proprioception: normal  Left arm proprioception: normal  Right leg proprioception: normal  Pinprick normal.   Right arm pinprick: normal  Left arm pinprick: normal  Right leg pinprick: normal    GAIT AND COORDINATION      Coordination   Finger to nose coordination: normal  Heel to shin coordination: abnormal  Tandem walking coordination: abnormal    Tremor   Resting tremor: absent  Intention tremor: absent  Action tremor: absent       Uses wheelchair for ambulation in clinic        Physical Exam  Vitals and nursing note reviewed.   Constitutional:       Appearance: Normal appearance.   HENT:      Head: Normocephalic.   Eyes:      Extraocular Movements: Extraocular movements intact and EOM normal.      Pupils: Pupils are equal, round, and reactive to light.   Cardiovascular:      Rate and Rhythm: Normal rate and regular rhythm.   Pulmonary:      Effort: Pulmonary effort is normal.      Breath sounds: Normal breath sounds.   Musculoskeletal:         General: No swelling or tenderness. Normal range of motion.      Cervical back: Normal range of motion and neck supple.      Right lower leg: No edema.      Left lower leg: No edema.   Skin:     General: Skin is warm and dry.      Coloration: Skin is not jaundiced.      Findings: No rash.   Neurological:      General: No focal deficit present.      Mental Status: She is alert and oriented to person, place, and time.      GCS: GCS eye subscore is 4. GCS verbal subscore is 5. GCS motor subscore is 6.      Cranial Nerves: No cranial nerve deficit.      Sensory: Sensory deficit  present.      Motor: Weakness present.      Coordination: Coordination abnormal. Heel to Shin Test abnormal. Finger-Nose-Finger Test normal.      Gait: Gait abnormal and tandem walk abnormal.      Deep Tendon Reflexes: Reflexes normal.      Reflex Scores:       Tricep reflexes are 2+ on the right side and 2+ on the left side.       Bicep reflexes are 2+ on the right side and 2+ on the left side.       Brachioradialis reflexes are 2+ on the right side and 2+ on the left side.       Patellar reflexes are 2+ on the right side and 2+ on the left side.       Achilles reflexes are 2+ on the right side and 2+ on the left side.  Psychiatric:         Mood and Affect: Mood normal.         Speech: Speech normal.         Behavior: Behavior normal.          Assessment:     Problem List Items Addressed This Visit        Neuro    Chronic migraine without aura - Primary    Memory impairment    Relevant Orders    Ambulatory referral/consult to Adult Neuropsychology    Diabetic peripheral neuropathy           Primary Diagnosis and ICD10  Intractable chronic migraine without aura and without status migrainosus [G43.719]    Plan:     Patient Instructions   1. Continue Ubrelvy as directed as needed for migraines    2. Keep follow-up with opthamology as scheduled    3. Keep follow-up with sleep clinic    4. Strongly encourage to keep strict control of glucose.      There are no discontinued medications.    Requested Prescriptions      No prescriptions requested or ordered in this encounter       Orders Placed This Encounter   Procedures    Ambulatory referral/consult to Adult Neuropsychology

## 2022-02-14 ENCOUNTER — OFFICE VISIT (OUTPATIENT)
Dept: FAMILY MEDICINE | Facility: CLINIC | Age: 57
End: 2022-02-14
Payer: MEDICARE

## 2022-02-14 VITALS
OXYGEN SATURATION: 96 % | WEIGHT: 209 LBS | HEIGHT: 67 IN | SYSTOLIC BLOOD PRESSURE: 176 MMHG | DIASTOLIC BLOOD PRESSURE: 68 MMHG | BODY MASS INDEX: 32.8 KG/M2 | TEMPERATURE: 99 F | RESPIRATION RATE: 17 BRPM | HEART RATE: 89 BPM

## 2022-02-14 DIAGNOSIS — E11.69 TYPE 2 DIABETES MELLITUS WITH OTHER SPECIFIED COMPLICATION, WITHOUT LONG-TERM CURRENT USE OF INSULIN: Chronic | ICD-10-CM

## 2022-02-14 DIAGNOSIS — R04.0 EPISTAXIS: ICD-10-CM

## 2022-02-14 DIAGNOSIS — J32.9 SINUSITIS, UNSPECIFIED CHRONICITY, UNSPECIFIED LOCATION: Primary | Chronic | ICD-10-CM

## 2022-02-14 DIAGNOSIS — I10 HYPERTENSION, UNSPECIFIED TYPE: Chronic | ICD-10-CM

## 2022-02-14 DIAGNOSIS — E78.5 DYSLIPIDEMIA: Chronic | ICD-10-CM

## 2022-02-14 DIAGNOSIS — M79.605 LEFT LEG PAIN: ICD-10-CM

## 2022-02-14 DIAGNOSIS — M79.89 LEFT LEG SWELLING: ICD-10-CM

## 2022-02-14 LAB
ANION GAP SERPL CALCULATED.3IONS-SCNC: 7 MMOL/L (ref 7–16)
BUN SERPL-MCNC: 11 MG/DL (ref 7–18)
BUN/CREAT SERPL: 18 (ref 6–20)
CALCIUM SERPL-MCNC: 8.2 MG/DL (ref 8.5–10.1)
CHLORIDE SERPL-SCNC: 108 MMOL/L (ref 98–107)
CO2 SERPL-SCNC: 31 MMOL/L (ref 21–32)
CREAT SERPL-MCNC: 0.61 MG/DL (ref 0.55–1.02)
EST. AVERAGE GLUCOSE BLD GHB EST-MCNC: 197 MG/DL
GLUCOSE SERPL-MCNC: 73 MG/DL (ref 74–106)
HBA1C MFR BLD HPLC: 8.5 % (ref 4.5–6.6)
POTASSIUM SERPL-SCNC: 2.7 MMOL/L (ref 3.5–5.1)
SODIUM SERPL-SCNC: 143 MMOL/L (ref 136–145)

## 2022-02-14 PROCEDURE — 83036 HEMOGLOBIN GLYCOSYLATED A1C: CPT | Mod: ,,, | Performed by: CLINICAL MEDICAL LABORATORY

## 2022-02-14 PROCEDURE — 80048 BASIC METABOLIC PNL TOTAL CA: CPT | Mod: ,,, | Performed by: CLINICAL MEDICAL LABORATORY

## 2022-02-14 PROCEDURE — 80048 BASIC METABOLIC PANEL: ICD-10-PCS | Mod: ,,, | Performed by: CLINICAL MEDICAL LABORATORY

## 2022-02-14 PROCEDURE — 99214 OFFICE O/P EST MOD 30 MIN: CPT | Mod: ,,, | Performed by: INTERNAL MEDICINE

## 2022-02-14 PROCEDURE — 83036 HEMOGLOBIN A1C: ICD-10-PCS | Mod: ,,, | Performed by: CLINICAL MEDICAL LABORATORY

## 2022-02-14 PROCEDURE — 99214 PR OFFICE/OUTPT VISIT, EST, LEVL IV, 30-39 MIN: ICD-10-PCS | Mod: ,,, | Performed by: INTERNAL MEDICINE

## 2022-02-14 RX ORDER — UBROGEPANT 100 MG/1
1 TABLET ORAL DAILY PRN
COMMUNITY
Start: 2021-12-02 | End: 2022-06-20 | Stop reason: SDUPTHER

## 2022-02-14 RX ORDER — APIXABAN 2.5 MG/1
TABLET, FILM COATED ORAL
Qty: 60 TABLET | Refills: 3 | Status: SHIPPED | OUTPATIENT
Start: 2022-02-14 | End: 2022-06-20 | Stop reason: SDUPTHER

## 2022-02-14 RX ORDER — ATORVASTATIN CALCIUM 80 MG/1
80 TABLET, FILM COATED ORAL DAILY
Qty: 90 TABLET | Refills: 1 | Status: SHIPPED | OUTPATIENT
Start: 2022-02-14 | End: 2022-06-20 | Stop reason: SDUPTHER

## 2022-02-14 RX ORDER — ESZOPICLONE 3 MG/1
3 TABLET, FILM COATED ORAL NIGHTLY
Qty: 30 TABLET | Refills: 2 | Status: SHIPPED | OUTPATIENT
Start: 2022-02-14 | End: 2022-06-20 | Stop reason: SDUPTHER

## 2022-02-14 RX ORDER — METOPROLOL SUCCINATE 25 MG/1
12.5 TABLET, EXTENDED RELEASE ORAL 2 TIMES DAILY
Qty: 90 TABLET | Refills: 1 | Status: SHIPPED | OUTPATIENT
Start: 2022-02-14 | End: 2022-06-20 | Stop reason: SDUPTHER

## 2022-02-14 RX ORDER — CYCLOSPORINE 0.5 MG/ML
EMULSION OPHTHALMIC
Qty: 60 EACH | Refills: 2 | Status: SHIPPED | OUTPATIENT
Start: 2022-02-14 | End: 2022-06-20 | Stop reason: SDUPTHER

## 2022-02-14 RX ORDER — FOSINOPIRL SODIUM 10 MG/1
10 TABLET ORAL DAILY
Qty: 90 TABLET | Refills: 1 | Status: SHIPPED | OUTPATIENT
Start: 2022-02-14 | End: 2022-06-20 | Stop reason: SDUPTHER

## 2022-02-14 RX ORDER — INSULIN GLARGINE 100 [IU]/ML
35 INJECTION, SOLUTION SUBCUTANEOUS DAILY
Qty: 30 ML | Refills: 3 | Status: SHIPPED | OUTPATIENT
Start: 2022-02-14 | End: 2022-06-20

## 2022-02-14 RX ORDER — PIOGLITAZONEHYDROCHLORIDE 15 MG/1
15 TABLET ORAL DAILY
Qty: 90 TABLET | Refills: 1 | Status: SHIPPED | OUTPATIENT
Start: 2022-02-14 | End: 2022-06-20 | Stop reason: SDUPTHER

## 2022-02-14 RX ORDER — DEXLANSOPRAZOLE 60 MG/1
1 CAPSULE, DELAYED RELEASE ORAL DAILY
Qty: 30 CAPSULE | Refills: 5 | Status: SHIPPED | OUTPATIENT
Start: 2022-02-14 | End: 2022-06-20 | Stop reason: SDUPTHER

## 2022-02-14 RX ORDER — PROMETHAZINE HYDROCHLORIDE 50 MG/1
50 TABLET ORAL
Qty: 20 TABLET | Refills: 1 | Status: SHIPPED | OUTPATIENT
Start: 2022-02-14 | End: 2022-06-20 | Stop reason: SDUPTHER

## 2022-02-14 RX ORDER — NITROGLYCERIN 0.4 MG/1
0.4 TABLET SUBLINGUAL
Qty: 25 TABLET | Refills: 2 | Status: SHIPPED | OUTPATIENT
Start: 2022-02-14 | End: 2022-06-20 | Stop reason: SDUPTHER

## 2022-02-14 RX ORDER — ICOSAPENT ETHYL 1000 MG/1
2 CAPSULE ORAL 2 TIMES DAILY
Qty: 60 CAPSULE | Refills: 3 | Status: SHIPPED | OUTPATIENT
Start: 2022-02-14 | End: 2022-06-20 | Stop reason: SDUPTHER

## 2022-02-14 RX ORDER — CLOPIDOGREL BISULFATE 75 MG/1
75 TABLET ORAL DAILY
Qty: 90 TABLET | Refills: 1 | Status: SHIPPED | OUTPATIENT
Start: 2022-02-14 | End: 2022-06-20 | Stop reason: SDUPTHER

## 2022-02-14 RX ORDER — APIXABAN 2.5 MG/1
2.5 TABLET, FILM COATED ORAL 2 TIMES DAILY
Qty: 60 TABLET | Refills: 3 | Status: SHIPPED | OUTPATIENT
Start: 2022-02-14 | End: 2022-06-20

## 2022-02-14 RX ORDER — GLIMEPIRIDE 2 MG/1
2 TABLET ORAL
Qty: 90 TABLET | Refills: 1 | Status: SHIPPED | OUTPATIENT
Start: 2022-02-14 | End: 2022-06-20 | Stop reason: SDUPTHER

## 2022-02-14 RX ORDER — EZETIMIBE 10 MG/1
10 TABLET ORAL DAILY
Qty: 90 TABLET | Refills: 1 | Status: SHIPPED | OUTPATIENT
Start: 2022-02-14 | End: 2022-06-20 | Stop reason: SDUPTHER

## 2022-02-14 NOTE — PATIENT INSTRUCTIONS
Anuradha was seen today for medication refill, pain and sinus problem.    Diagnoses and all orders for this visit:    Sinusitis, unspecified chronicity, unspecified location  Comments:  CT ordered, follows with [facility]  Orders:  -     CT Sinuses without Contrast; Future    Type 2 diabetes mellitus with other specified complication, without long-term current use of insulin  Comments:  Labs ordered  Orders:  -     Ambulatory referral/consult to Nutrition Services; Future  -     Hemoglobin A1C; Future  -     Basic Metabolic Panel; Future  -     Hemoglobin A1C  -     Basic Metabolic Panel  The following orders have not been finalized:  -     blood sugar diagnostic Strp    Hypertension, unspecified type  -     Basic Metabolic Panel; Future  -     Basic Metabolic Panel    Left leg pain  Comments:  US ordered  Orders:  -     US Lower Extremity Veins Left; Future    Epistaxis    Dyslipidemia  Comments:  Labs ordered    Left leg swelling  Comments:  US ordered    Other orders  The following orders have not been finalized:  -     atorvastatin (LIPITOR) 80 MG tablet  -     clopidogreL (PLAVIX) 75 mg tablet  -     dexlansoprazole (DEXILANT) 60 mg capsule  -     ELIQUIS 2.5 mg Tab  -     eszopiclone (LUNESTA) 3 mg Tab  -     ezetimibe (ZETIA) 10 mg tablet  -     fosinopriL (MONOPRIL) 10 MG Tab  -     glimepiride (AMARYL) 2 MG tablet  -     icosapent ethyL (VASCEPA) 1 gram Cap  -     insulin glargine (LANTUS) 100 unit/mL injection  -     insulin NPH-insulin regular, 70/30, (NOVOLIN 70/30) 100 unit/mL (70-30) injection  -     metoprolol succinate (TOPROL-XL) 25 MG 24 hr tablet  -     nitroGLYCERIN (NITROSTAT) 0.4 MG SL tablet  -     pioglitazone (ACTOS) 15 MG tablet  -     promethazine (PHENERGAN) 50 MG tablet  -     RESTASIS 0.05 % ophthalmic emulsion

## 2022-02-14 NOTE — PROGRESS NOTES
Subjective:       Patient ID: Anuradha Godfrey is a 56 y.o. female.    Chief Complaint: Medication Refill, Pain (Left amputee has a nodule that's painful), and Sinus Problem (With sinus pressure)    Patient is here for follow up evaluation. Blood pressure is 176/68 today. Patient state she is in pain. Follows with Dr. Teresa. States she went to see Dr Williamson Friday for a nose bleed. Will order CT of sinus. States she sees Suri Vasquez NP and has appointment on March 15th. Patient complains of pain in left leg stump and has felt a knot. Knot noted upon examination. Will order ultrasound of left leg stump STAT. Reviewed previous xray and all within normal limits. Patient requests dietician referral. Will refer to dietician. Will order in house labs today. Follow up in 3 weeks.       Current Medications:    Current Outpatient Medications:     UBROGEPANT 100 mg tablet, Take 1 tablet by mouth daily as needed., Disp: , Rfl:     amitriptyline (ELAVIL) 25 MG tablet, Take 1 tablet (25 mg total) by mouth once daily., Disp: 180 tablet, Rfl: 1    aspirin (ECOTRIN) 81 MG EC tablet, Take 81 mg by mouth., Disp: , Rfl:     atorvastatin (LIPITOR) 80 MG tablet, Take 1 tablet (80 mg total) by mouth once daily., Disp: 90 tablet, Rfl: 1    blood sugar diagnostic Strp, Test blood sugar TID, Disp: 100 each, Rfl: 11    blood sugar diagnostic Strp, 1 strip by Misc.(Non-Drug; Combo Route) route 3 (three) times daily., Disp: 200 strip, Rfl: 3    clopidogreL (PLAVIX) 75 mg tablet, Take 1 tablet (75 mg total) by mouth once daily., Disp: 90 tablet, Rfl: 1    dexlansoprazole (DEXILANT) 60 mg capsule, Take 1 capsule (60 mg total) by mouth once daily., Disp: 30 capsule, Rfl: 5    docusate sodium (COLACE) 50 MG capsule, Take by mouth., Disp: , Rfl:     ELIQUIS 2.5 mg Tab, TAKE 1 TABLET BY MOUTH TWICE A DAY, Disp: 60 tablet, Rfl: 3    eszopiclone (LUNESTA) 3 mg Tab, Take 1 tablet (3 mg total) by mouth every evening., Disp: 90 tablet, Rfl:  1    ezetimibe (ZETIA) 10 mg tablet, Take 1 tablet (10 mg total) by mouth once daily., Disp: 90 tablet, Rfl: 1    fosinopriL (MONOPRIL) 10 MG Tab, Take 1 tablet (10 mg total) by mouth once daily., Disp: 90 tablet, Rfl: 1    glimepiride (AMARYL) 2 MG tablet, Take 1 tablet (2 mg total) by mouth daily with breakfast., Disp: 90 tablet, Rfl: 1    HYDROcodone-acetaminophen (NORCO)  mg per tablet, TAKE 1 TABLET BY MOUTH 4 TIMES DAILY AS NEEDED ..MAY CAUSE DROWSINESS- AVOID ALCOHOL, Disp: , Rfl:     icosapent ethyL (VASCEPA) 1 gram Cap, Take 2 capsules (2 g total) by mouth 2 (two) times daily., Disp: 60 capsule, Rfl: 3    insulin glargine (LANTUS) 100 unit/mL injection, Inject 35 Units into the skin once daily., Disp: 30 mL, Rfl: 3    insulin NPH-insulin regular, 70/30, (NOVOLIN 70/30) 100 unit/mL (70-30) injection, Inject 1 Units into the skin 2 (two) times daily., Disp: 1 vial, Rfl: 3    methocarbamoL (ROBAXIN) 500 MG Tab, TAKE 1 TABLET BY MOUTH 3 TIMES DAILY WITH FOOD AS NEEDED ..MAY CAUSE DROWSINESS- AVOID ALCOHOL, Disp: , Rfl:     metoprolol succinate (TOPROL-XL) 25 MG 24 hr tablet, Take 0.5 tablets (12.5 mg total) by mouth 2 (two) times daily., Disp: 90 tablet, Rfl: 1    morphine (MS CONTIN) 15 MG 12 hr tablet, TAKE 1 TABLET BY MOUTH EACH NIGHT AT BEDTIME ..MAY CAUSE DROWSINESS- AVOID ALCOHOL, Disp: , Rfl:     nitroGLYCERIN (NITROSTAT) 0.4 MG SL tablet, Place 1 tablet (0.4 mg total) under the tongue as needed for Chest pain., Disp: 25 tablet, Rfl: 2    pioglitazone (ACTOS) 15 MG tablet, Take 1 tablet (15 mg total) by mouth once daily., Disp: 90 tablet, Rfl: 1    pregabalin (LYRICA) 200 MG Cap, TAKE 1 CAPSULE BY MOUTH 3 TIMES DAILY ..MAY CAUSE DROWSINESS- AVOID ALCOHOL, Disp: , Rfl:     promethazine (PHENERGAN) 50 MG tablet, Take 1 tablet (50 mg total) by mouth every 4 to 6 hours as needed for Nausea., Disp: 20 tablet, Rfl: 1    RESTASIS 0.05 % ophthalmic emulsion, PLACE 1 DROP IN EACH EYE TWICE A  DAY, Disp: 60 each, Rfl: 2    Last Labs:     No visits with results within 1 Month(s) from this visit.   Latest known visit with results is:   Office Visit on 01/05/2022   Component Date Value    Color, UA 01/05/2022 Yellow     Clarity, UA 01/05/2022 Clear     pH, UA 01/05/2022 6.5     Leukocytes, UA 01/05/2022 Negative     Nitrites, UA 01/05/2022 Negative     Protein, UA 01/05/2022 Negative     Glucose, UA 01/05/2022 250 *    Ketones, UA 01/05/2022 Negative     Urobilinogen, UA 01/05/2022 0.2     Bilirubin, UA 01/05/2022 Negative     Blood, UA 01/05/2022 Negative     Specific Gravity, UA 01/05/2022 1.015        Last Imaging:  X-Ray Sinuses Min 3 Views  Narrative: EXAMINATION:  XR SINUSES MIN 3 VIEWS    CLINICAL HISTORY:  Chronic sinusitis, unspecified    COMPARISON:  12 October 2021    FINDINGS:  No air-fluid levels or abnormal soft tissue density are present within the sinuses. No fracture is identified. Sinuses appear normally formed. No other abnormality seen.  Impression: No evidence of sinus abnormality demonstrated.    Electronically signed by: Panda Cam  Date:    01/05/2022  Time:    15:30         Review of Systems   HENT: Positive for nosebleeds.         Sinus issues   Musculoskeletal: Positive for back pain.        Left leg stump pain   All other systems reviewed and are negative.        Objective:      Physical Exam  Vitals reviewed.   Constitutional:       Appearance: Normal appearance. She is obese.   HENT:      Right Ear: Tympanic membrane normal.      Left Ear: Tympanic membrane normal.      Nose: Nose normal.      Mouth/Throat:      Mouth: Mucous membranes are moist.      Pharynx: Oropharynx is clear.   Cardiovascular:      Rate and Rhythm: Normal rate and regular rhythm.      Pulses: Normal pulses.      Heart sounds: Normal heart sounds.   Pulmonary:      Effort: Pulmonary effort is normal.   Abdominal:      General: Abdomen is flat. Bowel sounds are normal.      Palpations:  Abdomen is soft.   Musculoskeletal:         General: Normal range of motion.      Cervical back: Normal range of motion and neck supple.   Skin:     General: Skin is warm and dry.   Neurological:      General: No focal deficit present.      Mental Status: She is alert and oriented to person, place, and time. Mental status is at baseline.         Assessment:       1. Sinusitis, unspecified chronicity, unspecified location  CT Sinuses without Contrast    CT ordered, follows with [facility]   2. Type 2 diabetes mellitus with other specified complication, without long-term current use of insulin  Ambulatory referral/consult to Nutrition Services    Hemoglobin A1C    Basic Metabolic Panel    Hemoglobin A1C    Basic Metabolic Panel    Labs ordered   3. Hypertension, unspecified type  Basic Metabolic Panel    Basic Metabolic Panel   4. Left leg pain  US Lower Extremity Veins Left    US ordered   5. Epistaxis     6. Dyslipidemia      Labs ordered   7. Left leg swelling      US ordered        Plan:         Anuradha was seen today for medication refill, pain and sinus problem.    Diagnoses and all orders for this visit:    Sinusitis, unspecified chronicity, unspecified location  Comments:  CT ordered, follows with [facility]  Orders:  -     CT Sinuses without Contrast; Future    Type 2 diabetes mellitus with other specified complication, without long-term current use of insulin  Comments:  Labs ordered  Orders:  -     Ambulatory referral/consult to Nutrition Services; Future  -     Hemoglobin A1C; Future  -     Basic Metabolic Panel; Future  -     Hemoglobin A1C  -     Basic Metabolic Panel  The following orders have not been finalized:  -     blood sugar diagnostic Strp    Hypertension, unspecified type  -     Basic Metabolic Panel; Future  -     Basic Metabolic Panel    Left leg pain  Comments:  US ordered  Orders:  -     US Lower Extremity Veins Left; Future    Epistaxis    Dyslipidemia  Comments:  Labs ordered    Left leg  swelling  Comments:  US ordered    Other orders  The following orders have not been finalized:  -     atorvastatin (LIPITOR) 80 MG tablet  -     clopidogreL (PLAVIX) 75 mg tablet  -     dexlansoprazole (DEXILANT) 60 mg capsule  -     ELIQUIS 2.5 mg Tab  -     eszopiclone (LUNESTA) 3 mg Tab  -     ezetimibe (ZETIA) 10 mg tablet  -     fosinopriL (MONOPRIL) 10 MG Tab  -     glimepiride (AMARYL) 2 MG tablet  -     icosapent ethyL (VASCEPA) 1 gram Cap  -     insulin glargine (LANTUS) 100 unit/mL injection  -     insulin NPH-insulin regular, 70/30, (NOVOLIN 70/30) 100 unit/mL (70-30) injection  -     metoprolol succinate (TOPROL-XL) 25 MG 24 hr tablet  -     nitroGLYCERIN (NITROSTAT) 0.4 MG SL tablet  -     pioglitazone (ACTOS) 15 MG tablet  -     promethazine (PHENERGAN) 50 MG tablet  -     RESTASIS 0.05 % ophthalmic emulsion       Follow up in 3 weeks.

## 2022-02-15 ENCOUNTER — TELEPHONE (OUTPATIENT)
Dept: FAMILY MEDICINE | Facility: CLINIC | Age: 57
End: 2022-02-15
Payer: MEDICARE

## 2022-02-15 RX ORDER — POTASSIUM CHLORIDE 20 MEQ/1
20 TABLET, EXTENDED RELEASE ORAL 3 TIMES DAILY
Qty: 9 TABLET | Refills: 0 | Status: SHIPPED | OUTPATIENT
Start: 2022-02-15 | End: 2022-11-17

## 2022-02-15 NOTE — TELEPHONE ENCOUNTER
----- Message from Munir Garcia MD sent at 2/14/2022  4:12 PM CST -----  Kcl 20 meq po tid x 3 days       k  level in 1 week       Called patient to inform her of medication sent to pharmacy. Left message for her to call back if has any questions

## 2022-02-15 NOTE — TELEPHONE ENCOUNTER
Patient called back in, informed her of new med order for potassium and to come back in for lab in a week. Voiced understanding.

## 2022-02-16 ENCOUNTER — TELEPHONE (OUTPATIENT)
Dept: FAMILY MEDICINE | Facility: CLINIC | Age: 57
End: 2022-02-16
Payer: MEDICARE

## 2022-02-16 ENCOUNTER — TELEPHONE (OUTPATIENT)
Dept: CARDIOLOGY | Facility: CLINIC | Age: 57
End: 2022-02-16
Payer: MEDICARE

## 2022-02-16 DIAGNOSIS — E87.6 HYPOKALEMIA: Primary | ICD-10-CM

## 2022-02-16 NOTE — TELEPHONE ENCOUNTER
----- Message from Munir Garcia MD sent at 2/15/2022 11:46 AM CST -----  Kcl 20 meq po tid x 3 days     k level  1 week

## 2022-02-17 ENCOUNTER — HOSPITAL ENCOUNTER (OUTPATIENT)
Dept: RADIOLOGY | Facility: HOSPITAL | Age: 57
Discharge: HOME | End: 2022-02-17
Attending: INTERNAL MEDICINE
Payer: MEDICARE

## 2022-02-17 DIAGNOSIS — J32.9 SINUSITIS, UNSPECIFIED CHRONICITY, UNSPECIFIED LOCATION: ICD-10-CM

## 2022-02-17 DIAGNOSIS — M79.605 LEFT LEG PAIN: ICD-10-CM

## 2022-02-17 LAB
LEFT EYE DM RETINOPATHY: POSITIVE
RIGHT EYE DM RETINOPATHY: POSITIVE

## 2022-02-17 PROCEDURE — 93971 EXTREMITY STUDY: CPT | Mod: TC,RT

## 2022-02-17 PROCEDURE — 70486 CT SINUSES WITHOUT CONTRAST: ICD-10-PCS | Mod: 26,,, | Performed by: RADIOLOGY

## 2022-02-17 PROCEDURE — 70486 CT MAXILLOFACIAL W/O DYE: CPT | Mod: TC

## 2022-02-17 PROCEDURE — 93971 EXTREMITY STUDY: CPT | Mod: 26,RT,, | Performed by: RADIOLOGY

## 2022-02-17 PROCEDURE — 70486 CT MAXILLOFACIAL W/O DYE: CPT | Mod: 26,,, | Performed by: RADIOLOGY

## 2022-02-17 PROCEDURE — 93971 US LOWER EXTREMITY VEINS RIGHT: ICD-10-PCS | Mod: 26,RT,, | Performed by: RADIOLOGY

## 2022-03-01 ENCOUNTER — PROCEDURE VISIT (OUTPATIENT)
Dept: SLEEP MEDICINE | Facility: HOSPITAL | Age: 57
End: 2022-03-01
Attending: INTERNAL MEDICINE
Payer: MEDICARE

## 2022-03-01 DIAGNOSIS — G47.33 OSA (OBSTRUCTIVE SLEEP APNEA): ICD-10-CM

## 2022-03-01 PROCEDURE — 95811 POLYSOM 6/>YRS CPAP 4/> PARM: CPT | Mod: PO

## 2022-03-02 NOTE — PROCEDURES
Procedures   CPAP PSG summary included in technical report.   It  has been uploaded to [employer].

## 2022-03-10 ENCOUNTER — OFFICE VISIT (OUTPATIENT)
Dept: SURGERY | Facility: CLINIC | Age: 57
End: 2022-03-10
Payer: MEDICARE

## 2022-03-10 VITALS — BODY MASS INDEX: 32.8 KG/M2 | WEIGHT: 209 LBS | HEIGHT: 67 IN | HEART RATE: 97 BPM | OXYGEN SATURATION: 98 %

## 2022-03-10 DIAGNOSIS — I73.9 PVD (PERIPHERAL VASCULAR DISEASE): Primary | ICD-10-CM

## 2022-03-10 PROCEDURE — 99212 PR OFFICE/OUTPT VISIT, EST, LEVL II, 10-19 MIN: ICD-10-PCS | Mod: S$PBB,,, | Performed by: SURGERY

## 2022-03-10 PROCEDURE — 99212 OFFICE O/P EST SF 10 MIN: CPT | Mod: S$PBB,,, | Performed by: SURGERY

## 2022-03-10 PROCEDURE — 99213 OFFICE O/P EST LOW 20 MIN: CPT | Mod: PBBFAC | Performed by: SURGERY

## 2022-03-10 NOTE — PROGRESS NOTES
Vascular clinic    Follow-up left above knee amputation 6 years ago    Shows small nodule subcutaneous tissues posterior thigh exam    Proximal 1 x 1 cm mobile smooth firmness just beneath the skin surface posterior thigh on left    Probable epidermal inclusion cyst does not appear to be pathologic educated patient follow-up 3 months

## 2022-03-11 DIAGNOSIS — Z71.89 COMPLEX CARE COORDINATION: ICD-10-CM

## 2022-03-30 ENCOUNTER — OFFICE VISIT (OUTPATIENT)
Dept: OTOLARYNGOLOGY | Facility: CLINIC | Age: 57
End: 2022-03-30
Payer: MEDICARE

## 2022-03-30 VITALS — HEIGHT: 67 IN | WEIGHT: 203.5 LBS | BODY MASS INDEX: 31.94 KG/M2

## 2022-03-30 DIAGNOSIS — J30.9 ALLERGIC RHINITIS, UNSPECIFIED SEASONALITY, UNSPECIFIED TRIGGER: ICD-10-CM

## 2022-03-30 DIAGNOSIS — J31.0 CHRONIC RHINITIS: Primary | ICD-10-CM

## 2022-03-30 DIAGNOSIS — R09.81 CHRONIC NASAL CONGESTION: ICD-10-CM

## 2022-03-30 PROCEDURE — 99214 PR OFFICE/OUTPT VISIT, EST, LEVL IV, 30-39 MIN: ICD-10-PCS | Mod: S$PBB,,, | Performed by: OTOLARYNGOLOGY

## 2022-03-30 PROCEDURE — 99213 OFFICE O/P EST LOW 20 MIN: CPT | Mod: PBBFAC | Performed by: OTOLARYNGOLOGY

## 2022-03-30 PROCEDURE — 99214 OFFICE O/P EST MOD 30 MIN: CPT | Mod: S$PBB,,, | Performed by: OTOLARYNGOLOGY

## 2022-03-30 NOTE — PROGRESS NOTES
Subjective:       Patient ID: Anuradha Godfrey is a 56 y.o. female.    Chief Complaint: Allergic Rhinitis  (Pt states she sneezes and coughs when smelling strong smells.), Sore Throat, and Cough  CT scan in feb     HPI  Review of Systems   HENT: Positive for congestion, postnasal drip, rhinorrhea, sinus pressure and sinus pain.    All other systems reviewed and are negative.      Objective:      Physical Exam  General: NAD  Head: Normocephalic, atraumatic, no facial asymmetry/normal strength,  Ears: Both auricules normal in appearance, w/o deformities tympanic membranes normal external auditory canals normal  Nose: External nose w/o deformities normal turbinates no drainage or inflammation  Oral Cavity: Lips, gums, floor of mouth, tongue hard palate, and buccal mucosa without mass/lesion  Oropharynx: Mucosa pink and moist, soft palate, posterior pharynx and oropharyngeal wall without mass/lesion  Neck: Supple, symmetric, trachea midline, no palpable mass/lesion, no palpable cervical lymphadenopathy  Skin: Warm and dry, no concerning lesions  Respiratory: Respirations even, unlabored    Assessment:       1. Chronic rhinitis    2. Allergic rhinitis, unspecified seasonality, unspecified trigger    3. Chronic nasal congestion        Plan:       Ct scan reviewed from Feb reveals some sphenoid sinus mucosal thickening   Will do allergy test and treat accordingly

## 2022-05-23 ENCOUNTER — CLINICAL SUPPORT (OUTPATIENT)
Dept: OTOLARYNGOLOGY | Facility: CLINIC | Age: 57
End: 2022-05-23
Payer: MEDICARE

## 2022-05-23 DIAGNOSIS — J31.0 CHRONIC RHINITIS: ICD-10-CM

## 2022-05-23 DIAGNOSIS — R09.81 CHRONIC NASAL CONGESTION: ICD-10-CM

## 2022-05-23 DIAGNOSIS — J30.1 NON-SEASONAL ALLERGIC RHINITIS DUE TO POLLEN: ICD-10-CM

## 2022-05-23 DIAGNOSIS — J30.9 ALLERGIC RHINITIS, UNSPECIFIED SEASONALITY, UNSPECIFIED TRIGGER: Primary | ICD-10-CM

## 2022-05-23 DIAGNOSIS — J30.1 ALLERGIC REACTION TO GRASS POLLEN: ICD-10-CM

## 2022-05-23 PROCEDURE — 95165 PR PROFES SVC,IMMUNOTHER,SINGLE/MULT AGS: ICD-10-PCS | Mod: S$PBB,,, | Performed by: OTOLARYNGOLOGY

## 2022-05-23 PROCEDURE — 95165 ANTIGEN THERAPY SERVICES: CPT | Mod: PBBFAC | Performed by: OTOLARYNGOLOGY

## 2022-05-23 PROCEDURE — 95165 ANTIGEN THERAPY SERVICES: CPT | Mod: S$PBB,,, | Performed by: OTOLARYNGOLOGY

## 2022-05-23 PROCEDURE — 95115 IMMUNOTHERAPY ONE INJECTION: CPT | Mod: PBBFAC | Performed by: OTOLARYNGOLOGY

## 2022-05-23 RX ORDER — EPINEPHRINE 0.3 MG/.3ML
1 INJECTION SUBCUTANEOUS ONCE
Qty: 0.3 ML | Refills: 0 | Status: SHIPPED | OUTPATIENT
Start: 2022-05-23 | End: 2024-03-20

## 2022-05-23 NOTE — PROGRESS NOTES
Established patient with Dr. Williamson. See previous note for written order. Allergy injections per Dr. Williamson.  Vial test administered from vial A.  10 minute vial test mm reactions  Administered left arm at 10:05 AM  3mm observed  First dose of 0.02ml given.  20 minute mm reaction  10:06 AM   left arm; observation 3mm.  Epi-pen ordered.

## 2022-05-31 ENCOUNTER — CLINICAL SUPPORT (OUTPATIENT)
Dept: OTOLARYNGOLOGY | Facility: CLINIC | Age: 57
End: 2022-05-31
Payer: MEDICARE

## 2022-05-31 DIAGNOSIS — J31.0 CHRONIC RHINITIS: ICD-10-CM

## 2022-05-31 DIAGNOSIS — J30.1 ALLERGIC REACTION TO GRASS POLLEN: ICD-10-CM

## 2022-05-31 DIAGNOSIS — J30.1 NON-SEASONAL ALLERGIC RHINITIS DUE TO POLLEN: ICD-10-CM

## 2022-05-31 DIAGNOSIS — R09.81 CHRONIC NASAL CONGESTION: ICD-10-CM

## 2022-05-31 DIAGNOSIS — J30.9 ALLERGIC RHINITIS, UNSPECIFIED SEASONALITY, UNSPECIFIED TRIGGER: Primary | ICD-10-CM

## 2022-05-31 PROCEDURE — 95115 IMMUNOTHERAPY ONE INJECTION: CPT | Mod: PBBFAC | Performed by: OTOLARYNGOLOGY

## 2022-05-31 NOTE — PROGRESS NOTES
Established patient with Dr. Williamson. See previous note for written order. Allergy injections per Dr. Williamson.  20 minute mm reaction  9:42 AM   left arm; observation 5mm.

## 2022-06-03 ENCOUNTER — TELEPHONE (OUTPATIENT)
Dept: FAMILY MEDICINE | Facility: CLINIC | Age: 57
End: 2022-06-03
Payer: MEDICARE

## 2022-06-03 NOTE — TELEPHONE ENCOUNTER
"----- Message from Rosemary Hsu sent at 6/2/2022  2:51 PM CDT -----  Pt would like a call back #[phone] says shes getting calls from a company back to back from "a company dr haney is signed with to talk to her every 30 days" ????? She would like to be taken off the call list for this company????     Called patient re: above message. No answer. Left message for her to ask the company to remove her. Also left in message that Dr. Haney nor staff member Brittany not in today to be able to see what company might be telling her this.  "

## 2022-06-13 ENCOUNTER — CLINICAL SUPPORT (OUTPATIENT)
Dept: OTOLARYNGOLOGY | Facility: CLINIC | Age: 57
End: 2022-06-13
Payer: MEDICARE

## 2022-06-13 DIAGNOSIS — J30.1 ALLERGIC REACTION TO GRASS POLLEN: ICD-10-CM

## 2022-06-13 DIAGNOSIS — R09.81 CHRONIC NASAL CONGESTION: ICD-10-CM

## 2022-06-13 DIAGNOSIS — J30.1 NON-SEASONAL ALLERGIC RHINITIS DUE TO POLLEN: ICD-10-CM

## 2022-06-13 DIAGNOSIS — J30.9 ALLERGIC RHINITIS, UNSPECIFIED SEASONALITY, UNSPECIFIED TRIGGER: Primary | ICD-10-CM

## 2022-06-13 DIAGNOSIS — J31.0 CHRONIC RHINITIS: ICD-10-CM

## 2022-06-13 PROCEDURE — 95115 IMMUNOTHERAPY ONE INJECTION: CPT | Mod: PBBFAC | Performed by: OTOLARYNGOLOGY

## 2022-06-13 NOTE — PROGRESS NOTES
Established patient with Dr. Williamson. See previous note for written order. Allergy injections per Dr. Williamson.  20 minute mm reaction  9:20 AM   left arm; observation 5mm

## 2022-06-20 ENCOUNTER — OFFICE VISIT (OUTPATIENT)
Dept: FAMILY MEDICINE | Facility: CLINIC | Age: 57
End: 2022-06-20
Payer: MEDICARE

## 2022-06-20 VITALS
WEIGHT: 219 LBS | SYSTOLIC BLOOD PRESSURE: 134 MMHG | RESPIRATION RATE: 20 BRPM | TEMPERATURE: 98 F | HEART RATE: 86 BPM | HEIGHT: 67 IN | OXYGEN SATURATION: 98 % | BODY MASS INDEX: 34.37 KG/M2 | DIASTOLIC BLOOD PRESSURE: 70 MMHG

## 2022-06-20 DIAGNOSIS — E87.6 HYPOKALEMIA: ICD-10-CM

## 2022-06-20 DIAGNOSIS — J30.89 NON-SEASONAL ALLERGIC RHINITIS DUE TO OTHER ALLERGIC TRIGGER: Primary | ICD-10-CM

## 2022-06-20 DIAGNOSIS — E11.319 DIABETIC RETINOPATHY OF BOTH EYES ASSOCIATED WITH TYPE 2 DIABETES MELLITUS, MACULAR EDEMA PRESENCE UNSPECIFIED, UNSPECIFIED RETINOPATHY SEVERITY: ICD-10-CM

## 2022-06-20 DIAGNOSIS — E11.69 TYPE 2 DIABETES MELLITUS WITH OTHER SPECIFIED COMPLICATION, WITHOUT LONG-TERM CURRENT USE OF INSULIN: Chronic | ICD-10-CM

## 2022-06-20 DIAGNOSIS — E78.2 MIXED HYPERLIPIDEMIA: ICD-10-CM

## 2022-06-20 LAB
ANION GAP SERPL CALCULATED.3IONS-SCNC: 7 MMOL/L (ref 7–16)
BUN SERPL-MCNC: 7 MG/DL (ref 7–18)
BUN/CREAT SERPL: 11 (ref 6–20)
CALCIUM SERPL-MCNC: 8.5 MG/DL (ref 8.5–10.1)
CHLORIDE SERPL-SCNC: 110 MMOL/L (ref 98–107)
CO2 SERPL-SCNC: 28 MMOL/L (ref 21–32)
CREAT SERPL-MCNC: 0.64 MG/DL (ref 0.55–1.02)
EST. AVERAGE GLUCOSE BLD GHB EST-MCNC: 170 MG/DL
GLUCOSE SERPL-MCNC: 136 MG/DL (ref 74–106)
HBA1C MFR BLD HPLC: 7.7 % (ref 4.5–6.6)
POTASSIUM SERPL-SCNC: 3.6 MMOL/L (ref 3.5–5.1)
SODIUM SERPL-SCNC: 141 MMOL/L (ref 136–145)

## 2022-06-20 PROCEDURE — 83036 HEMOGLOBIN A1C: ICD-10-PCS | Mod: ,,, | Performed by: CLINICAL MEDICAL LABORATORY

## 2022-06-20 PROCEDURE — 83036 HEMOGLOBIN GLYCOSYLATED A1C: CPT | Mod: ,,, | Performed by: CLINICAL MEDICAL LABORATORY

## 2022-06-20 PROCEDURE — 99214 PR OFFICE/OUTPT VISIT, EST, LEVL IV, 30-39 MIN: ICD-10-PCS | Mod: ,,, | Performed by: INTERNAL MEDICINE

## 2022-06-20 PROCEDURE — 80048 BASIC METABOLIC PNL TOTAL CA: CPT | Mod: ,,, | Performed by: CLINICAL MEDICAL LABORATORY

## 2022-06-20 PROCEDURE — 99214 OFFICE O/P EST MOD 30 MIN: CPT | Mod: ,,, | Performed by: INTERNAL MEDICINE

## 2022-06-20 PROCEDURE — 80048 BASIC METABOLIC PANEL: ICD-10-PCS | Mod: ,,, | Performed by: CLINICAL MEDICAL LABORATORY

## 2022-06-20 RX ORDER — APIXABAN 2.5 MG/1
2.5 TABLET, FILM COATED ORAL 2 TIMES DAILY
Qty: 60 TABLET | Refills: 1 | Status: SHIPPED | OUTPATIENT
Start: 2022-06-20 | End: 2022-10-18 | Stop reason: SDUPTHER

## 2022-06-20 RX ORDER — PROMETHAZINE HYDROCHLORIDE 50 MG/1
50 TABLET ORAL
Qty: 20 TABLET | Refills: 1 | Status: SHIPPED | OUTPATIENT
Start: 2022-06-20 | End: 2022-10-18 | Stop reason: SDUPTHER

## 2022-06-20 RX ORDER — FOSINOPIRL SODIUM 10 MG/1
10 TABLET ORAL DAILY
Qty: 90 TABLET | Refills: 1 | Status: SHIPPED | OUTPATIENT
Start: 2022-06-20 | End: 2022-10-18 | Stop reason: SDUPTHER

## 2022-06-20 RX ORDER — EZETIMIBE 10 MG/1
10 TABLET ORAL DAILY
Qty: 90 TABLET | Refills: 1 | Status: SHIPPED | OUTPATIENT
Start: 2022-06-20 | End: 2022-10-18 | Stop reason: SDUPTHER

## 2022-06-20 RX ORDER — NITROGLYCERIN 0.4 MG/1
0.4 TABLET SUBLINGUAL
Qty: 25 TABLET | Refills: 2 | Status: SHIPPED | OUTPATIENT
Start: 2022-06-20 | End: 2022-10-18 | Stop reason: SDUPTHER

## 2022-06-20 RX ORDER — CYCLOSPORINE 0.5 MG/ML
EMULSION OPHTHALMIC
Qty: 60 EACH | Refills: 2 | Status: SHIPPED | OUTPATIENT
Start: 2022-06-20 | End: 2022-10-18 | Stop reason: SDUPTHER

## 2022-06-20 RX ORDER — ICOSAPENT ETHYL 1000 MG/1
2 CAPSULE ORAL 2 TIMES DAILY
Qty: 60 CAPSULE | Refills: 3 | Status: SHIPPED | OUTPATIENT
Start: 2022-06-20 | End: 2022-10-18 | Stop reason: SDUPTHER

## 2022-06-20 RX ORDER — METOPROLOL SUCCINATE 25 MG/1
12.5 TABLET, EXTENDED RELEASE ORAL 2 TIMES DAILY
Qty: 90 TABLET | Refills: 1 | Status: SHIPPED | OUTPATIENT
Start: 2022-06-20 | End: 2022-10-18 | Stop reason: SDUPTHER

## 2022-06-20 RX ORDER — CLOPIDOGREL BISULFATE 75 MG/1
75 TABLET ORAL DAILY
Qty: 90 TABLET | Refills: 1 | Status: SHIPPED | OUTPATIENT
Start: 2022-06-20 | End: 2022-10-18 | Stop reason: SDUPTHER

## 2022-06-20 RX ORDER — INSULIN GLARGINE 100 [IU]/ML
INJECTION, SOLUTION SUBCUTANEOUS
COMMUNITY
Start: 2022-06-01 | End: 2022-06-20 | Stop reason: SDUPTHER

## 2022-06-20 RX ORDER — DEXLANSOPRAZOLE 60 MG/1
1 CAPSULE, DELAYED RELEASE ORAL DAILY
Qty: 30 CAPSULE | Refills: 5 | Status: SHIPPED | OUTPATIENT
Start: 2022-06-20 | End: 2022-11-21 | Stop reason: SDUPTHER

## 2022-06-20 RX ORDER — AMITRIPTYLINE HYDROCHLORIDE 50 MG/1
TABLET, FILM COATED ORAL
COMMUNITY
Start: 2022-05-17 | End: 2024-01-30 | Stop reason: SDUPTHER

## 2022-06-20 RX ORDER — PIOGLITAZONEHYDROCHLORIDE 15 MG/1
15 TABLET ORAL DAILY
Qty: 90 TABLET | Refills: 1 | Status: SHIPPED | OUTPATIENT
Start: 2022-06-20 | End: 2022-10-18 | Stop reason: SDUPTHER

## 2022-06-20 RX ORDER — ESZOPICLONE 3 MG/1
3 TABLET, FILM COATED ORAL NIGHTLY
Qty: 30 TABLET | Refills: 2 | Status: SHIPPED | OUTPATIENT
Start: 2022-06-20 | End: 2022-10-18 | Stop reason: SDUPTHER

## 2022-06-20 RX ORDER — INSULIN GLARGINE 100 [IU]/ML
INJECTION, SOLUTION SUBCUTANEOUS
Qty: 1 EACH | Refills: 1 | Status: CANCELLED | OUTPATIENT
Start: 2022-06-20

## 2022-06-20 RX ORDER — INSULIN ASPART 100 [IU]/ML
INJECTION, SUSPENSION SUBCUTANEOUS
COMMUNITY
Start: 2022-06-01 | End: 2022-07-21 | Stop reason: SDUPTHER

## 2022-06-20 RX ORDER — INSULIN GLARGINE 100 [IU]/ML
35 INJECTION, SOLUTION SUBCUTANEOUS NIGHTLY
Qty: 12 ML | Refills: 2 | Status: SHIPPED | OUTPATIENT
Start: 2022-06-20 | End: 2022-07-21 | Stop reason: SDUPTHER

## 2022-06-20 RX ORDER — UBROGEPANT 100 MG/1
100 TABLET ORAL DAILY PRN
Qty: 30 TABLET | Refills: 1 | Status: SHIPPED | OUTPATIENT
Start: 2022-06-20 | End: 2022-10-18 | Stop reason: SDUPTHER

## 2022-06-20 RX ORDER — GLIMEPIRIDE 2 MG/1
2 TABLET ORAL
Qty: 90 TABLET | Refills: 1 | Status: SHIPPED | OUTPATIENT
Start: 2022-06-20 | End: 2022-10-18 | Stop reason: SDUPTHER

## 2022-06-20 RX ORDER — ATORVASTATIN CALCIUM 80 MG/1
80 TABLET, FILM COATED ORAL DAILY
Qty: 90 TABLET | Refills: 1 | Status: SHIPPED | OUTPATIENT
Start: 2022-06-20 | End: 2022-10-18 | Stop reason: SDUPTHER

## 2022-06-20 NOTE — PROGRESS NOTES
Subjective:       Patient ID: Anuradha Godfrey is a 56 y.o. female.    Chief Complaint: Follow-up, Medication Refill, and Diabetes    Patient is here today for a follow up evaluation. Patient has no new complaints. Patient blood pressure is stable today on intake, 134/70. Patient states she did follow with Ophthalmology. Will refill medications today. Will follow in 3 months.       Current Medications:    Current Outpatient Medications:     amitriptyline (ELAVIL) 25 MG tablet, Take 1 tablet (25 mg total) by mouth once daily., Disp: 180 tablet, Rfl: 1    amitriptyline (ELAVIL) 50 MG tablet, TAKE 1 TABLET BY MOUTH EACH EVENING, Disp: , Rfl:     aspirin (ECOTRIN) 81 MG EC tablet, Take 81 mg by mouth., Disp: , Rfl:     atorvastatin (LIPITOR) 80 MG tablet, Take 1 tablet (80 mg total) by mouth once daily., Disp: 90 tablet, Rfl: 1    blood sugar diagnostic Strp, 1 strip by Misc.(Non-Drug; Combo Route) route 3 (three) times daily., Disp: 200 strip, Rfl: 3    clopidogreL (PLAVIX) 75 mg tablet, Take 1 tablet (75 mg total) by mouth once daily., Disp: 90 tablet, Rfl: 1    dexlansoprazole (DEXILANT) 60 mg capsule, Take 1 capsule (60 mg total) by mouth once daily., Disp: 30 capsule, Rfl: 5    docusate sodium (COLACE) 50 MG capsule, Take by mouth., Disp: , Rfl:     ELIQUIS 2.5 mg Tab, TAKE 1 TABLET BY MOUTH TWICE A DAY, Disp: 60 tablet, Rfl: 3    eszopiclone (LUNESTA) 3 mg Tab, Take 1 tablet (3 mg total) by mouth every evening., Disp: 30 tablet, Rfl: 2    ezetimibe (ZETIA) 10 mg tablet, Take 1 tablet (10 mg total) by mouth once daily., Disp: 90 tablet, Rfl: 1    fosinopriL (MONOPRIL) 10 MG Tab, Take 1 tablet (10 mg total) by mouth once daily., Disp: 90 tablet, Rfl: 1    glimepiride (AMARYL) 2 MG tablet, Take 1 tablet (2 mg total) by mouth daily with breakfast., Disp: 90 tablet, Rfl: 1    HYDROcodone-acetaminophen (NORCO)  mg per tablet, TAKE 1 TABLET BY MOUTH 4 TIMES DAILY AS NEEDED ..MAY CAUSE DROWSINESS- AVOID  ALCOHOL, Disp: , Rfl:     icosapent ethyL (VASCEPA) 1 gram Cap, Take 2 capsules (2 g total) by mouth 2 (two) times daily., Disp: 60 capsule, Rfl: 3    insulin NPH-insulin regular, 70/30, (NOVOLIN 70/30) 100 unit/mL (70-30) injection, Inject 1 Units into the skin 2 (two) times daily., Disp: 1 each, Rfl: 3    LANTUS SOLOSTAR U-100 INSULIN glargine 100 units/mL (3mL) SubQ pen, INJECT 35 UNITS SUB Q ONCE DAILY ..REFRIGERATE, Disp: , Rfl:     methocarbamoL (ROBAXIN) 500 MG Tab, TAKE 1 TABLET BY MOUTH 3 TIMES DAILY WITH FOOD AS NEEDED ..MAY CAUSE DROWSINESS- AVOID ALCOHOL, Disp: , Rfl:     metoprolol succinate (TOPROL-XL) 25 MG 24 hr tablet, Take 0.5 tablets (12.5 mg total) by mouth 2 (two) times daily., Disp: 90 tablet, Rfl: 1    morphine (MS CONTIN) 15 MG 12 hr tablet, TAKE 1 TABLET BY MOUTH EACH NIGHT AT BEDTIME ..MAY CAUSE DROWSINESS- AVOID ALCOHOL, Disp: , Rfl:     nitroGLYCERIN (NITROSTAT) 0.4 MG SL tablet, Place 1 tablet (0.4 mg total) under the tongue as needed for Chest pain., Disp: 25 tablet, Rfl: 2    NOVOLOG MIX 70-30FLEXPEN U-100 100 unit/mL (70-30) InPn pen, INJECT 20 UNITS SUB Q TWICE A DAY ..REFRIGERATE, Disp: , Rfl:     pioglitazone (ACTOS) 15 MG tablet, Take 1 tablet (15 mg total) by mouth once daily., Disp: 90 tablet, Rfl: 1    potassium chloride SA (K-DUR,KLOR-CON) 20 MEQ tablet, Take 1 tablet (20 mEq total) by mouth 3 (three) times daily., Disp: 9 tablet, Rfl: 0    pregabalin (LYRICA) 200 MG Cap, TAKE 1 CAPSULE BY MOUTH 3 TIMES DAILY ..MAY CAUSE DROWSINESS- AVOID ALCOHOL, Disp: , Rfl:     promethazine (PHENERGAN) 50 MG tablet, Take 1 tablet (50 mg total) by mouth every 4 to 6 hours as needed for Nausea., Disp: 20 tablet, Rfl: 1    RESTASIS 0.05 % ophthalmic emulsion, PLACE 1 DROP IN EACH EYE TWICE A DAY, Disp: 60 each, Rfl: 2    UBROGEPANT 100 mg tablet, Take 1 tablet by mouth daily as needed., Disp: , Rfl:     EPINEPHrine (EPIPEN) 0.3 mg/0.3 mL AtIn, Inject 0.3 mLs (0.3 mg total) into  the muscle once. for 1 dose, Disp: 0.3 mL, Rfl: 0    Last Labs:     No visits with results within 1 Month(s) from this visit.   Latest known visit with results is:   Lab Visit on 03/30/2022   Component Date Value    House Dust Mites/D.P., I* 03/30/2022 <0.35     House Dust Mites/D.F., I* 03/30/2022 <0.35     Cat Epithelium, IgE 03/30/2022 <0.35     Dog Dander, IgE 03/30/2022 <0.35     B[place] Grass, IgE 03/30/2022 <0.35     Jason Grass, IgE 03/30/2022 0.63     Cockroach, IgE 03/30/2022 <0.35     Penicillium, IgE 03/30/2022 <0.35     Cladosporium, IgE 03/30/2022 <0.35     Aspergillus Fumigatus, I* 03/30/2022 <0.35     Alternaria Tenuis, IgE 03/30/2022 <0.35     Box Eld/Maple, IgE 03/30/2022 <0.35     Silver Birch, IgE 03/30/2022 <0.35     Mountain Ambia, IgE 03/30/2022 <0.35     Oak, IgE 03/30/2022 <0.35     Elm, IgE 03/30/2022 <0.35     [place] Tree, IgE 03/30/2022 <0.35     Pecan [place], IgE 03/30/2022 <0.35     [place], IgE 03/30/2022 <0.35     Short Ragweed, IgE 03/30/2022 <0.35     Rough Pigweed, IgE 03/30/2022 <0.35     Rough [place] Elder, IgE 03/30/2022 <0.35     Immunoglobulin E (IgE) 03/30/2022 7.4     Goldenrod, IgE 03/30/2022 <0.35        Last Imaging:  US Lower Extremity Veins Right  Narrative: EXAMINATION:  US LOWER EXTREMITY VEINS RIGHT    CLINICAL HISTORY:  Pain in left leg    TECHNIQUE:  Duplex and color flow Doppler and dynamic compression was performed of the bilateral lower extremity veins was performed.    COMPARISON:  None.    FINDINGS:  No evidence of echogenic, non-compressible thrombus seen in the visualized veins of the extremities.  Color Doppler venous waveform pattern is within normal limits.  Slightly heterogeneous area of echogenicity seen in the soft tissues at the level of amputation could represent small lymph node.  Impression: No evidence of deep venous thrombosis.    Ultrasound images stored and captured.    Electronically signed by: Panda  Dorina  Date:    02/17/2022  Time:    15:23  CT Sinuses without Contrast  Narrative: EXAMINATION:  CT SINUSES WITHOUT CONTRAST    CLINICAL HISTORY:  chronic sinus swelling; Chronic sinusitis, unspecified    TECHNIQUE:  Axial CT imaging of the facial bones was performed without contrast.  Computer reformatting is viewed in the coronal plane.    CT dose reduction technique used - Dose Rite and tube current modulation.    COMPARISON:  None available    FINDINGS:  No evidence of fracture seen.  No fluid or abnormal density is seen in the nasal passage or sinuses.  Ostiomeatal complexes are patent.  Septum is near midline.  No abnormal soft tissue density is present.  No evidence of orbit abnormality is seen.  Impression: No evidence of abnormality demonstrated    Electronically signed by: Panda Cam  Date:    02/17/2022  Time:    14:14         Review of Systems   All other systems reviewed and are negative.        Objective:      Physical Exam  Vitals reviewed.   Constitutional:       Appearance: Normal appearance. She is normal weight.   Cardiovascular:      Rate and Rhythm: Normal rate and regular rhythm.      Pulses: Normal pulses.      Heart sounds: Normal heart sounds.   Pulmonary:      Effort: Pulmonary effort is normal.      Breath sounds: Normal breath sounds.   Abdominal:      General: Abdomen is flat. Bowel sounds are normal.      Palpations: Abdomen is soft.   Musculoskeletal:         General: Normal range of motion.      Cervical back: Normal range of motion and neck supple.   Skin:     General: Skin is warm and dry.   Neurological:      General: No focal deficit present.      Mental Status: She is alert and oriented to person, place, and time. Mental status is at baseline.         Assessment:       1. Non-seasonal allergic rhinitis due to other allergic trigger     2. Type 2 diabetes mellitus with other specified complication, without long-term current use of insulin  Basic Metabolic Panel     Hemoglobin A1C    Basic Metabolic Panel    Hemoglobin A1C    Labs ordered   3. Hypokalemia     4. Mixed hyperlipidemia     5. Diabetic retinopathy of both eyes associated with type 2 diabetes mellitus, macular edema presence unspecified, unspecified retinopathy severity          Plan:         Anuradha was seen today for follow-up, medication refill and diabetes.    Diagnoses and all orders for this visit:    Non-seasonal allergic rhinitis due to other allergic trigger    Type 2 diabetes mellitus with other specified complication, without long-term current use of insulin  Comments:  Labs ordered  Orders:  -     Basic Metabolic Panel; Future  -     Hemoglobin A1C; Future  -     Basic Metabolic Panel  -     Hemoglobin A1C  The following orders have not been finalized:  -     blood sugar diagnostic Strp    Hypokalemia    Mixed hyperlipidemia    Diabetic retinopathy of both eyes associated with type 2 diabetes mellitus, macular edema presence unspecified, unspecified retinopathy severity    Other orders  The following orders have not been finalized:  -     atorvastatin (LIPITOR) 80 MG tablet  -     clopidogreL (PLAVIX) 75 mg tablet  -     dexlansoprazole (DEXILANT) 60 mg capsule  -     ELIQUIS 2.5 mg Tab  -     eszopiclone (LUNESTA) 3 mg Tab  -     ezetimibe (ZETIA) 10 mg tablet  -     fosinopriL (MONOPRIL) 10 MG Tab  -     glimepiride (AMARYL) 2 MG tablet  -     icosapent ethyL (VASCEPA) 1 gram Cap  -     insulin NPH-insulin regular, 70/30, (NOVOLIN 70/30) 100 unit/mL (70-30) injection  -     Cancel: LANTUS SOLOSTAR U-100 INSULIN glargine 100 units/mL (3mL) SubQ pen  -     metoprolol succinate (TOPROL-XL) 25 MG 24 hr tablet  -     nitroGLYCERIN (NITROSTAT) 0.4 MG SL tablet  -     pioglitazone (ACTOS) 15 MG tablet  -     promethazine (PHENERGAN) 50 MG tablet  -     RESTASIS 0.05 % ophthalmic emulsion  -     UBROGEPANT 100 mg tablet  -     LANTUS SOLOSTAR U-100 INSULIN glargine 100 units/mL (3mL) SubQ pen

## 2022-06-20 NOTE — PATIENT INSTRUCTIONS
Anuradha was seen today for follow-up, medication refill and diabetes.    Diagnoses and all orders for this visit:    Non-seasonal allergic rhinitis due to other allergic trigger    Type 2 diabetes mellitus with other specified complication, without long-term current use of insulin  Comments:  Labs ordered  Orders:  -     Basic Metabolic Panel; Future  -     Hemoglobin A1C; Future  -     Basic Metabolic Panel  -     Hemoglobin A1C  The following orders have not been finalized:  -     blood sugar diagnostic Strp    Hypokalemia    Mixed hyperlipidemia    Diabetic retinopathy of both eyes associated with type 2 diabetes mellitus, macular edema presence unspecified, unspecified retinopathy severity    Other orders  The following orders have not been finalized:  -     atorvastatin (LIPITOR) 80 MG tablet  -     clopidogreL (PLAVIX) 75 mg tablet  -     dexlansoprazole (DEXILANT) 60 mg capsule  -     ELIQUIS 2.5 mg Tab  -     eszopiclone (LUNESTA) 3 mg Tab  -     ezetimibe (ZETIA) 10 mg tablet  -     fosinopriL (MONOPRIL) 10 MG Tab  -     glimepiride (AMARYL) 2 MG tablet  -     icosapent ethyL (VASCEPA) 1 gram Cap  -     insulin NPH-insulin regular, 70/30, (NOVOLIN 70/30) 100 unit/mL (70-30) injection  -     Cancel: LANTUS SOLOSTAR U-100 INSULIN glargine 100 units/mL (3mL) SubQ pen  -     metoprolol succinate (TOPROL-XL) 25 MG 24 hr tablet  -     nitroGLYCERIN (NITROSTAT) 0.4 MG SL tablet  -     pioglitazone (ACTOS) 15 MG tablet  -     promethazine (PHENERGAN) 50 MG tablet  -     RESTASIS 0.05 % ophthalmic emulsion  -     UBROGEPANT 100 mg tablet  -     LANTUS SOLOSTAR U-100 INSULIN glargine 100 units/mL (3mL) SubQ pen

## 2022-06-21 ENCOUNTER — CLINICAL SUPPORT (OUTPATIENT)
Dept: OTOLARYNGOLOGY | Facility: CLINIC | Age: 57
End: 2022-06-21
Payer: MEDICARE

## 2022-06-21 DIAGNOSIS — J30.9 ALLERGIC RHINITIS, UNSPECIFIED SEASONALITY, UNSPECIFIED TRIGGER: Primary | ICD-10-CM

## 2022-06-21 DIAGNOSIS — R09.81 CHRONIC NASAL CONGESTION: ICD-10-CM

## 2022-06-21 DIAGNOSIS — J30.1 NON-SEASONAL ALLERGIC RHINITIS DUE TO POLLEN: ICD-10-CM

## 2022-06-21 DIAGNOSIS — J30.1 ALLERGIC REACTION TO GRASS POLLEN: ICD-10-CM

## 2022-06-21 DIAGNOSIS — J31.0 CHRONIC RHINITIS: ICD-10-CM

## 2022-06-21 PROCEDURE — 95115 IMMUNOTHERAPY ONE INJECTION: CPT | Mod: PBBFAC | Performed by: OTOLARYNGOLOGY

## 2022-06-21 NOTE — PROGRESS NOTES
Established patient with Dr. Williamson. See previous note for written order. Allergy injections per Dr. Williamson.  20 minute mm reaction  8:58 AM   left arm; observation 4mm

## 2022-07-21 RX ORDER — INSULIN GLARGINE 100 [IU]/ML
35 INJECTION, SOLUTION SUBCUTANEOUS NIGHTLY
Qty: 12 ML | Refills: 4 | Status: SHIPPED | OUTPATIENT
Start: 2022-07-21 | End: 2022-10-18 | Stop reason: SDUPTHER

## 2022-07-21 RX ORDER — INSULIN ASPART 100 [IU]/ML
20 INJECTION, SUSPENSION SUBCUTANEOUS 2 TIMES DAILY
Qty: 12 ML | Refills: 5 | Status: SHIPPED | OUTPATIENT
Start: 2022-07-21 | End: 2022-10-18 | Stop reason: SDUPTHER

## 2022-07-21 NOTE — TELEPHONE ENCOUNTER
----- Message from Rosemary Hsu sent at 7/21/2022 12:06 PM CDT -----  Pt would like a call back concerning the wrong medication being sent in, she needs her pen sent in.     #[phone]

## 2022-07-27 ENCOUNTER — TELEPHONE (OUTPATIENT)
Dept: FAMILY MEDICINE | Facility: CLINIC | Age: 57
End: 2022-07-27
Payer: MEDICARE

## 2022-08-25 DIAGNOSIS — G47.33 OSA (OBSTRUCTIVE SLEEP APNEA): Primary | ICD-10-CM

## 2022-10-09 ENCOUNTER — PROCEDURE VISIT (OUTPATIENT)
Dept: SLEEP MEDICINE | Facility: HOSPITAL | Age: 57
End: 2022-10-09
Attending: INTERNAL MEDICINE
Payer: MEDICARE

## 2022-10-09 DIAGNOSIS — Z71.89 COMPLEX CARE COORDINATION: ICD-10-CM

## 2022-10-09 DIAGNOSIS — G47.33 OSA (OBSTRUCTIVE SLEEP APNEA): ICD-10-CM

## 2022-10-09 PROCEDURE — 95811 POLYSOM 6/>YRS CPAP 4/> PARM: CPT | Mod: PO

## 2022-10-17 ENCOUNTER — OFFICE VISIT (OUTPATIENT)
Dept: FAMILY MEDICINE | Facility: CLINIC | Age: 57
End: 2022-10-17
Payer: MEDICARE

## 2022-10-17 ENCOUNTER — APPOINTMENT (OUTPATIENT)
Dept: RADIOLOGY | Facility: CLINIC | Age: 57
End: 2022-10-17
Attending: INTERNAL MEDICINE
Payer: MEDICARE

## 2022-10-17 VITALS
SYSTOLIC BLOOD PRESSURE: 178 MMHG | WEIGHT: 223 LBS | RESPIRATION RATE: 18 BRPM | HEART RATE: 87 BPM | TEMPERATURE: 98 F | BODY MASS INDEX: 34.93 KG/M2 | DIASTOLIC BLOOD PRESSURE: 90 MMHG | OXYGEN SATURATION: 98 %

## 2022-10-17 DIAGNOSIS — Z79.4 TYPE 2 DIABETES MELLITUS WITHOUT COMPLICATION, WITH LONG-TERM CURRENT USE OF INSULIN: Primary | ICD-10-CM

## 2022-10-17 DIAGNOSIS — S89.91XA INJURY OF RIGHT LOWER EXTREMITY, INITIAL ENCOUNTER: ICD-10-CM

## 2022-10-17 DIAGNOSIS — E11.9 TYPE 2 DIABETES MELLITUS WITHOUT COMPLICATION, WITH LONG-TERM CURRENT USE OF INSULIN: Primary | ICD-10-CM

## 2022-10-17 DIAGNOSIS — H61.23 BILATERAL IMPACTED CERUMEN: ICD-10-CM

## 2022-10-17 DIAGNOSIS — S69.91XA FINGER INJURY, RIGHT, INITIAL ENCOUNTER: ICD-10-CM

## 2022-10-17 LAB
ALBUMIN SERPL BCP-MCNC: 3 G/DL (ref 3.5–5)
ALP SERPL-CCNC: 94 U/L (ref 46–118)
ALT SERPL W P-5'-P-CCNC: 15 U/L (ref 13–56)
ANION GAP SERPL CALCULATED.3IONS-SCNC: 9 MMOL/L (ref 7–16)
AST SERPL W P-5'-P-CCNC: 13 U/L (ref 15–37)
BILIRUB DIRECT SERPL-MCNC: <0.1 MG/DL (ref 0–0.2)
BILIRUB SERPL-MCNC: 0.3 MG/DL (ref ?–1.2)
BUN SERPL-MCNC: 9 MG/DL (ref 7–18)
BUN/CREAT SERPL: 15 (ref 6–20)
CALCIUM SERPL-MCNC: 8.6 MG/DL (ref 8.5–10.1)
CHLORIDE SERPL-SCNC: 110 MMOL/L (ref 98–107)
CO2 SERPL-SCNC: 27 MMOL/L (ref 21–32)
CREAT SERPL-MCNC: 0.62 MG/DL (ref 0.55–1.02)
EGFR (NO RACE VARIABLE) (RUSH/TITUS): 103 ML/MIN/1.73M²
EST. AVERAGE GLUCOSE BLD GHB EST-MCNC: 187 MG/DL
GLUCOSE SERPL-MCNC: 94 MG/DL (ref 74–106)
HBA1C MFR BLD HPLC: 8.2 % (ref 4.5–6.6)
POTASSIUM SERPL-SCNC: 3.8 MMOL/L (ref 3.5–5.1)
PROT SERPL-MCNC: 7.1 G/DL (ref 6.4–8.2)
SODIUM SERPL-SCNC: 142 MMOL/L (ref 136–145)

## 2022-10-17 PROCEDURE — 80048 BASIC METABOLIC PANEL: ICD-10-PCS | Mod: ,,, | Performed by: CLINICAL MEDICAL LABORATORY

## 2022-10-17 PROCEDURE — 73590 X-RAY EXAM OF LOWER LEG: CPT | Mod: TC,RHCUB,RT | Performed by: INTERNAL MEDICINE

## 2022-10-17 PROCEDURE — 83036 HEMOGLOBIN A1C: ICD-10-PCS | Mod: ,,, | Performed by: CLINICAL MEDICAL LABORATORY

## 2022-10-17 PROCEDURE — 80076 HEPATIC FUNCTION PANEL: ICD-10-PCS | Mod: ,,, | Performed by: CLINICAL MEDICAL LABORATORY

## 2022-10-17 PROCEDURE — 99214 PR OFFICE/OUTPT VISIT, EST, LEVL IV, 30-39 MIN: ICD-10-PCS | Mod: ,,, | Performed by: INTERNAL MEDICINE

## 2022-10-17 PROCEDURE — 99214 OFFICE O/P EST MOD 30 MIN: CPT | Mod: ,,, | Performed by: INTERNAL MEDICINE

## 2022-10-17 PROCEDURE — 80076 HEPATIC FUNCTION PANEL: CPT | Mod: ,,, | Performed by: CLINICAL MEDICAL LABORATORY

## 2022-10-17 PROCEDURE — 80048 BASIC METABOLIC PNL TOTAL CA: CPT | Mod: ,,, | Performed by: CLINICAL MEDICAL LABORATORY

## 2022-10-17 PROCEDURE — 83036 HEMOGLOBIN GLYCOSYLATED A1C: CPT | Mod: ,,, | Performed by: CLINICAL MEDICAL LABORATORY

## 2022-10-17 RX ORDER — NAPROXEN 500 MG/1
500 TABLET ORAL 2 TIMES DAILY
Qty: 20 TABLET | Refills: 0 | Status: SHIPPED | OUTPATIENT
Start: 2022-10-17 | End: 2024-03-20

## 2022-10-17 RX ORDER — METHOCARBAMOL 750 MG/1
TABLET, FILM COATED ORAL
COMMUNITY
Start: 2022-09-14

## 2022-10-17 NOTE — PATIENT INSTRUCTIONS
Anuradha was seen today for hypertension, diabetes, back pain, hip pain and medication refill.    Diagnoses and all orders for this visit:    Type 2 diabetes mellitus without complication, with long-term current use of insulin  -     Basic Metabolic Panel; Future  -     Hemoglobin A1C; Future  -     Hepatic Function Panel; Future  -     Basic Metabolic Panel  -     Hemoglobin A1C  -     Hepatic Function Panel    Injury of right lower extremity, initial encounter  -     X-Ray Tibia Fibula 2 View Right; Future    Bilateral impacted cerumen    Finger injury, right, initial encounter    Other orders  The following orders have not been finalized:  -     naproxen (NAPROSYN) 500 MG tablet

## 2022-10-17 NOTE — PROGRESS NOTES
"Subjective:       Patient ID: Anuradha Godfrey is a 57 y.o. female.    Chief Complaint: Hypertension, Diabetes, Back Pain (Lower back; back injection on 10/25/22), Hip Pain (left), and Medication Refill    Patient is here today for a follow up evaluation. Patient blood pressure is increased today on intake, 178/90. Patient states blood sugar levels have been stable. Patient states blood sugar level this morning was 107. Patient has a complaint of possible splinter in right thumb. Patient states she uses "a wooden back scratcher to scratch her back and thinks she may have a splinter". Patient has no sign of splinter or infection of the right thumb on exam. Recommended washing finger with warm water and mild soap. Patient has a complaint of discomfort of bilateral ears. No signs of infection in both right and left ear. Patient has a complaint of right leg discomfort. Patient states she twisted her right leg a week ago. Will order x-ray of right tib and fib. Will order Naprosyn 500mg PO BID PRN #20. Patient is requesting medication refills. Will refill today. Will order lab work today. Will follow with patient in 2 weeks for recheck blood pressure.     Current Medications:    Current Outpatient Medications:     amitriptyline (ELAVIL) 25 MG tablet, Take 1 tablet (25 mg total) by mouth once daily., Disp: 180 tablet, Rfl: 1    amitriptyline (ELAVIL) 50 MG tablet, TAKE 1 TABLET BY MOUTH EACH EVENING, Disp: , Rfl:     aspirin (ECOTRIN) 81 MG EC tablet, Take 81 mg by mouth., Disp: , Rfl:     atorvastatin (LIPITOR) 80 MG tablet, Take 1 tablet (80 mg total) by mouth once daily., Disp: 90 tablet, Rfl: 1    blood sugar diagnostic Strp, 1 strip by Misc.(Non-Drug; Combo Route) route 3 (three) times daily., Disp: 200 strip, Rfl: 3    clopidogreL (PLAVIX) 75 mg tablet, Take 1 tablet (75 mg total) by mouth once daily., Disp: 90 tablet, Rfl: 1    dexlansoprazole (DEXILANT) 60 mg capsule, Take 1 capsule (60 mg total) by mouth once " daily., Disp: 30 capsule, Rfl: 5    docusate sodium (COLACE) 50 MG capsule, Take by mouth., Disp: , Rfl:     ELIQUIS 2.5 mg Tab, Take 1 tablet (2.5 mg total) by mouth 2 (two) times daily., Disp: 60 tablet, Rfl: 1    eszopiclone (LUNESTA) 3 mg Tab, Take 1 tablet (3 mg total) by mouth every evening., Disp: 30 tablet, Rfl: 2    ezetimibe (ZETIA) 10 mg tablet, Take 1 tablet (10 mg total) by mouth once daily., Disp: 90 tablet, Rfl: 1    fosinopriL (MONOPRIL) 10 MG Tab, Take 1 tablet (10 mg total) by mouth once daily., Disp: 90 tablet, Rfl: 1    glimepiride (AMARYL) 2 MG tablet, Take 1 tablet (2 mg total) by mouth daily with breakfast., Disp: 90 tablet, Rfl: 1    HYDROcodone-acetaminophen (NORCO)  mg per tablet, TAKE 1 TABLET BY MOUTH 4 TIMES DAILY AS NEEDED ..MAY CAUSE DROWSINESS- AVOID ALCOHOL, Disp: , Rfl:     icosapent ethyL (VASCEPA) 1 gram Cap, Take 2 capsules (2 g total) by mouth 2 (two) times daily., Disp: 60 capsule, Rfl: 3    LANTUS SOLOSTAR U-100 INSULIN glargine 100 units/mL SubQ pen, Inject 35 Units into the skin every evening., Disp: 12 mL, Rfl: 4    methocarbamoL (ROBAXIN) 750 MG Tab, TAKE 1 TABLET BY MOUTH 3 TIMES DAILY WITH FOOD AS NEEDED ..MAY CAUSE DROWSINESS- AVOID ALCOHOL, Disp: , Rfl:     metoprolol succinate (TOPROL-XL) 25 MG 24 hr tablet, Take 0.5 tablets (12.5 mg total) by mouth 2 (two) times daily., Disp: 90 tablet, Rfl: 1    morphine (MS CONTIN) 15 MG 12 hr tablet, TAKE 1 TABLET BY MOUTH EACH NIGHT AT BEDTIME ..MAY CAUSE DROWSINESS- AVOID ALCOHOL, Disp: , Rfl:     nitroGLYCERIN (NITROSTAT) 0.4 MG SL tablet, Place 1 tablet (0.4 mg total) under the tongue as needed for Chest pain., Disp: 25 tablet, Rfl: 2    NOVOLOG MIX 70-30FLEXPEN U-100 100 unit/mL (70-30) InPn pen, Inject 20 Units into the skin 2 (two) times a day., Disp: 12 mL, Rfl: 5    pioglitazone (ACTOS) 15 MG tablet, Take 1 tablet (15 mg total) by mouth once daily., Disp: 90 tablet, Rfl: 1    pregabalin (LYRICA) 200 MG Cap, TAKE 1  CAPSULE BY MOUTH 3 TIMES DAILY ..MAY CAUSE DROWSINESS- AVOID ALCOHOL, Disp: , Rfl:     promethazine (PHENERGAN) 50 MG tablet, Take 1 tablet (50 mg total) by mouth every 4 to 6 hours as needed for Nausea., Disp: 20 tablet, Rfl: 1    RESTASIS 0.05 % ophthalmic emulsion, PLACE 1 DROP IN EACH EYE TWICE A DAY, Disp: 60 each, Rfl: 2    UBROGEPANT 100 mg tablet, Take 1 tablet (100 mg total) by mouth daily as needed for Migraine., Disp: 30 tablet, Rfl: 1    EPINEPHrine (EPIPEN) 0.3 mg/0.3 mL AtIn, Inject 0.3 mLs (0.3 mg total) into the muscle once. for 1 dose, Disp: 0.3 mL, Rfl: 0    methocarbamoL (ROBAXIN) 500 MG Tab, TAKE 1 TABLET BY MOUTH 3 TIMES DAILY WITH FOOD AS NEEDED ..MAY CAUSE DROWSINESS- AVOID ALCOHOL, Disp: , Rfl:     potassium chloride SA (K-DUR,KLOR-CON) 20 MEQ tablet, Take 1 tablet (20 mEq total) by mouth 3 (three) times daily. (Patient not taking: Reported on 10/17/2022), Disp: 9 tablet, Rfl: 0    Last Labs:     No visits with results within 1 Month(s) from this visit.   Latest known visit with results is:   Patient Outreach on 06/23/2022   Component Date Value    CRC Recommendation Exter* 07/18/2018 Repeat colonoscopy in 5 years        Last Imaging:  US Lower Extremity Veins Right  Narrative: EXAMINATION:  US LOWER EXTREMITY VEINS RIGHT    CLINICAL HISTORY:  Pain in left leg    TECHNIQUE:  Duplex and color flow Doppler and dynamic compression was performed of the bilateral lower extremity veins was performed.    COMPARISON:  None.    FINDINGS:  No evidence of echogenic, non-compressible thrombus seen in the visualized veins of the extremities.  Color Doppler venous waveform pattern is within normal limits.  Slightly heterogeneous area of echogenicity seen in the soft tissues at the level of amputation could represent small lymph node.  Impression: No evidence of deep venous thrombosis.    Ultrasound images stored and captured.    Electronically signed by: Panda  Dorina  Date:    02/17/2022  Time:    15:23  CT Sinuses without Contrast  Narrative: EXAMINATION:  CT SINUSES WITHOUT CONTRAST    CLINICAL HISTORY:  chronic sinus swelling; Chronic sinusitis, unspecified    TECHNIQUE:  Axial CT imaging of the facial bones was performed without contrast.  Computer reformatting is viewed in the coronal plane.    CT dose reduction technique used - Dose Rite and tube current modulation.    COMPARISON:  None available    FINDINGS:  No evidence of fracture seen.  No fluid or abnormal density is seen in the nasal passage or sinuses.  Ostiomeatal complexes are patent.  Septum is near midline.  No abnormal soft tissue density is present.  No evidence of orbit abnormality is seen.  Impression: No evidence of abnormality demonstrated    Electronically signed by: Panda Cam  Date:    02/17/2022  Time:    14:14         Review of Systems   HENT:  Positive for ear pain.    Musculoskeletal:  Positive for leg pain.   Integumentary:         Right finger injury/discomfort    All other systems reviewed and are negative.      Objective:      Physical Exam  Vitals reviewed.   Constitutional:       Appearance: Normal appearance. She is normal weight.   Cardiovascular:      Rate and Rhythm: Normal rate and regular rhythm.      Pulses: Normal pulses.      Heart sounds: Normal heart sounds.   Pulmonary:      Effort: Pulmonary effort is normal.      Breath sounds: Normal breath sounds.   Abdominal:      General: Abdomen is flat. Bowel sounds are normal.      Palpations: Abdomen is soft.   Musculoskeletal:         General: Normal range of motion.      Cervical back: Normal range of motion and neck supple.   Skin:     General: Skin is warm and dry.   Neurological:      General: No focal deficit present.      Mental Status: She is alert and oriented to person, place, and time. Mental status is at baseline.       Assessment:       1. Type 2 diabetes mellitus without complication, with long-term current use  of insulin  Basic Metabolic Panel    Hemoglobin A1C    Hepatic Function Panel    Basic Metabolic Panel    Hemoglobin A1C    Hepatic Function Panel      2. Injury of right lower extremity, initial encounter  X-Ray Tibia Fibula 2 View Right      3. Bilateral impacted cerumen        4. Finger injury, right, initial encounter             Plan:         Anuradha was seen today for hypertension, diabetes, back pain, hip pain and medication refill.    Diagnoses and all orders for this visit:    Type 2 diabetes mellitus without complication, with long-term current use of insulin  -     Basic Metabolic Panel; Future  -     Hemoglobin A1C; Future  -     Hepatic Function Panel; Future  -     Basic Metabolic Panel  -     Hemoglobin A1C  -     Hepatic Function Panel    Injury of right lower extremity, initial encounter  -     X-Ray Tibia Fibula 2 View Right; Future    Bilateral impacted cerumen    Finger injury, right, initial encounter    Other orders  The following orders have not been finalized:  -     naproxen (NAPROSYN) 500 MG tablet

## 2022-10-18 RX ORDER — GLIMEPIRIDE 2 MG/1
2 TABLET ORAL
Qty: 90 TABLET | Refills: 1 | Status: SHIPPED | OUTPATIENT
Start: 2022-10-18 | End: 2023-01-23 | Stop reason: SDUPTHER

## 2022-10-18 RX ORDER — PROMETHAZINE HYDROCHLORIDE 50 MG/1
50 TABLET ORAL
Qty: 20 TABLET | Refills: 1 | Status: SHIPPED | OUTPATIENT
Start: 2022-10-18 | End: 2023-01-23 | Stop reason: SDUPTHER

## 2022-10-18 RX ORDER — EZETIMIBE 10 MG/1
10 TABLET ORAL DAILY
Qty: 90 TABLET | Refills: 1 | Status: SHIPPED | OUTPATIENT
Start: 2022-10-18 | End: 2024-01-30 | Stop reason: SDUPTHER

## 2022-10-18 RX ORDER — UBROGEPANT 100 MG/1
100 TABLET ORAL DAILY PRN
Qty: 30 TABLET | Refills: 1 | Status: SHIPPED | OUTPATIENT
Start: 2022-10-18 | End: 2023-01-23 | Stop reason: SDUPTHER

## 2022-10-18 RX ORDER — PIOGLITAZONEHYDROCHLORIDE 15 MG/1
15 TABLET ORAL DAILY
Qty: 90 TABLET | Refills: 1 | Status: SHIPPED | OUTPATIENT
Start: 2022-10-18 | End: 2023-01-23 | Stop reason: SDUPTHER

## 2022-10-18 RX ORDER — CYCLOSPORINE 0.5 MG/ML
EMULSION OPHTHALMIC
Qty: 60 EACH | Refills: 2 | Status: SHIPPED | OUTPATIENT
Start: 2022-10-18 | End: 2023-01-23 | Stop reason: SDUPTHER

## 2022-10-18 RX ORDER — METOPROLOL SUCCINATE 25 MG/1
12.5 TABLET, EXTENDED RELEASE ORAL 2 TIMES DAILY
Qty: 90 TABLET | Refills: 1 | Status: SHIPPED | OUTPATIENT
Start: 2022-10-18 | End: 2022-12-19

## 2022-10-18 RX ORDER — APIXABAN 2.5 MG/1
2.5 TABLET, FILM COATED ORAL 2 TIMES DAILY
Qty: 60 TABLET | Refills: 1 | Status: SHIPPED | OUTPATIENT
Start: 2022-10-18 | End: 2023-01-17 | Stop reason: SDUPTHER

## 2022-10-18 RX ORDER — ATORVASTATIN CALCIUM 80 MG/1
80 TABLET, FILM COATED ORAL DAILY
Qty: 90 TABLET | Refills: 1 | Status: SHIPPED | OUTPATIENT
Start: 2022-10-18 | End: 2023-01-23 | Stop reason: SDUPTHER

## 2022-10-18 RX ORDER — AMITRIPTYLINE HYDROCHLORIDE 25 MG/1
25 TABLET, FILM COATED ORAL DAILY
Qty: 180 TABLET | Refills: 1 | Status: SHIPPED | OUTPATIENT
Start: 2022-10-18 | End: 2023-09-26 | Stop reason: SDUPTHER

## 2022-10-18 RX ORDER — NITROGLYCERIN 0.4 MG/1
0.4 TABLET SUBLINGUAL
Qty: 25 TABLET | Refills: 2 | Status: SHIPPED | OUTPATIENT
Start: 2022-10-18 | End: 2023-01-23 | Stop reason: SDUPTHER

## 2022-10-18 RX ORDER — FOSINOPIRL SODIUM 10 MG/1
10 TABLET ORAL DAILY
Qty: 90 TABLET | Refills: 1 | Status: SHIPPED | OUTPATIENT
Start: 2022-10-18 | End: 2022-11-17 | Stop reason: SDUPTHER

## 2022-10-18 RX ORDER — INSULIN ASPART 100 [IU]/ML
20 INJECTION, SUSPENSION SUBCUTANEOUS 2 TIMES DAILY
Qty: 12 ML | Refills: 5 | Status: SHIPPED | OUTPATIENT
Start: 2022-10-18 | End: 2023-06-08 | Stop reason: SDUPTHER

## 2022-10-18 RX ORDER — CLOPIDOGREL BISULFATE 75 MG/1
75 TABLET ORAL DAILY
Qty: 90 TABLET | Refills: 1 | Status: SHIPPED | OUTPATIENT
Start: 2022-10-18 | End: 2023-01-23 | Stop reason: SDUPTHER

## 2022-10-18 RX ORDER — ICOSAPENT ETHYL 1000 MG/1
2 CAPSULE ORAL 2 TIMES DAILY
Qty: 60 CAPSULE | Refills: 3 | Status: SHIPPED | OUTPATIENT
Start: 2022-10-18 | End: 2024-03-20

## 2022-10-18 RX ORDER — INSULIN GLARGINE 100 [IU]/ML
35 INJECTION, SOLUTION SUBCUTANEOUS NIGHTLY
Qty: 12 ML | Refills: 4 | Status: SHIPPED | OUTPATIENT
Start: 2022-10-18 | End: 2023-05-01

## 2022-10-18 RX ORDER — ESZOPICLONE 3 MG/1
3 TABLET, FILM COATED ORAL NIGHTLY
Qty: 30 TABLET | Refills: 2 | Status: SHIPPED | OUTPATIENT
Start: 2022-10-18 | End: 2023-01-23 | Stop reason: SDUPTHER

## 2022-10-18 NOTE — TELEPHONE ENCOUNTER
----- Message from Etelvina Kaba sent at 10/18/2022  9:13 AM CDT -----  Patient is asking for all of her medications that she takes to be called in to [facility]    Patient call back - [phone]

## 2022-10-24 ENCOUNTER — TELEPHONE (OUTPATIENT)
Dept: FAMILY MEDICINE | Facility: CLINIC | Age: 57
End: 2022-10-24
Payer: MEDICARE

## 2022-10-24 NOTE — TELEPHONE ENCOUNTER
----- Message from Royal Panda sent at 10/19/2022  3:32 PM CDT -----  Pt called and asked for a call back about a medicine needing to be changed over     [phone]- attempted to call pt back regarding which med; no answer; left message.

## 2022-11-04 RX ORDER — ESOMEPRAZOLE MAGNESIUM 40 MG/1
40 CAPSULE, DELAYED RELEASE ORAL
Qty: 90 CAPSULE | Refills: 1 | Status: SHIPPED | OUTPATIENT
Start: 2022-11-04 | End: 2023-11-04

## 2022-11-04 RX ORDER — DEXLANSOPRAZOLE 60 MG/1
1 CAPSULE, DELAYED RELEASE ORAL DAILY
Qty: 30 CAPSULE | Refills: 1 | Status: CANCELLED | OUTPATIENT
Start: 2022-11-04

## 2022-11-04 NOTE — TELEPHONE ENCOUNTER
----- Message from Royal Panda sent at 11/3/2022 10:59 AM CDT -----  Pt called and asked for a refill on this medicine     dexlansoprazole (DEXILANT) 60 mg capsule    [phone]    [facility]

## 2022-11-10 ENCOUNTER — PATIENT MESSAGE (OUTPATIENT)
Dept: FAMILY MEDICINE | Facility: CLINIC | Age: 57
End: 2022-11-10
Payer: MEDICARE

## 2022-11-10 DIAGNOSIS — E11.69 TYPE 2 DIABETES MELLITUS WITH OTHER SPECIFIED COMPLICATION, WITHOUT LONG-TERM CURRENT USE OF INSULIN: Chronic | ICD-10-CM

## 2022-11-10 DIAGNOSIS — I25.110 ATHEROSCLEROSIS OF NATIVE CORONARY ARTERY OF NATIVE HEART WITH UNSTABLE ANGINA PECTORIS: Primary | ICD-10-CM

## 2022-11-10 RX ORDER — DEXLANSOPRAZOLE 60 MG/1
1 CAPSULE, DELAYED RELEASE ORAL DAILY
Qty: 30 CAPSULE | Refills: 5 | Status: CANCELLED | OUTPATIENT
Start: 2022-11-10 | End: 2023-05-09

## 2022-11-17 ENCOUNTER — OFFICE VISIT (OUTPATIENT)
Dept: CARDIOLOGY | Facility: CLINIC | Age: 57
End: 2022-11-17
Payer: MEDICARE

## 2022-11-17 VITALS
WEIGHT: 230 LBS | HEIGHT: 67 IN | RESPIRATION RATE: 18 BRPM | HEART RATE: 103 BPM | DIASTOLIC BLOOD PRESSURE: 70 MMHG | SYSTOLIC BLOOD PRESSURE: 160 MMHG | BODY MASS INDEX: 36.1 KG/M2

## 2022-11-17 DIAGNOSIS — I73.9 PERIPHERAL ARTERY DISEASE: ICD-10-CM

## 2022-11-17 DIAGNOSIS — I25.110 ATHEROSCLEROSIS OF NATIVE CORONARY ARTERY OF NATIVE HEART WITH UNSTABLE ANGINA PECTORIS: ICD-10-CM

## 2022-11-17 DIAGNOSIS — R53.83 FATIGUE, UNSPECIFIED TYPE: ICD-10-CM

## 2022-11-17 DIAGNOSIS — M79.89 LEG SWELLING: Primary | ICD-10-CM

## 2022-11-17 DIAGNOSIS — R00.0 SINUS TACHYCARDIA: ICD-10-CM

## 2022-11-17 DIAGNOSIS — I10 BENIGN ESSENTIAL HTN: ICD-10-CM

## 2022-11-17 DIAGNOSIS — E11.69 TYPE 2 DIABETES MELLITUS WITH OTHER SPECIFIED COMPLICATION, WITHOUT LONG-TERM CURRENT USE OF INSULIN: Chronic | ICD-10-CM

## 2022-11-17 DIAGNOSIS — I25.10 ATHEROSCLEROSIS OF NATIVE CORONARY ARTERY OF NATIVE HEART WITHOUT ANGINA PECTORIS: ICD-10-CM

## 2022-11-17 PROCEDURE — 93010 ELECTROCARDIOGRAM REPORT: CPT | Mod: S$PBB,,, | Performed by: STUDENT IN AN ORGANIZED HEALTH CARE EDUCATION/TRAINING PROGRAM

## 2022-11-17 PROCEDURE — 99213 OFFICE O/P EST LOW 20 MIN: CPT | Mod: PBBFAC | Performed by: STUDENT IN AN ORGANIZED HEALTH CARE EDUCATION/TRAINING PROGRAM

## 2022-11-17 PROCEDURE — 93005 ELECTROCARDIOGRAM TRACING: CPT | Mod: PBBFAC | Performed by: STUDENT IN AN ORGANIZED HEALTH CARE EDUCATION/TRAINING PROGRAM

## 2022-11-17 PROCEDURE — 99214 PR OFFICE/OUTPT VISIT, EST, LEVL IV, 30-39 MIN: ICD-10-PCS | Mod: S$PBB,,, | Performed by: STUDENT IN AN ORGANIZED HEALTH CARE EDUCATION/TRAINING PROGRAM

## 2022-11-17 PROCEDURE — 93010 EKG 12-LEAD: ICD-10-PCS | Mod: S$PBB,,, | Performed by: STUDENT IN AN ORGANIZED HEALTH CARE EDUCATION/TRAINING PROGRAM

## 2022-11-17 PROCEDURE — 99214 OFFICE O/P EST MOD 30 MIN: CPT | Mod: S$PBB,,, | Performed by: STUDENT IN AN ORGANIZED HEALTH CARE EDUCATION/TRAINING PROGRAM

## 2022-11-17 RX ORDER — DEXLANSOPRAZOLE 60 MG/1
1 CAPSULE, DELAYED RELEASE ORAL DAILY
Qty: 30 CAPSULE | Refills: 5 | Status: CANCELLED | OUTPATIENT
Start: 2022-11-17 | End: 2023-05-16

## 2022-11-17 RX ORDER — FOSINOPRIL SODIUM 20 MG/1
20 TABLET ORAL DAILY
Qty: 90 TABLET | Refills: 3 | Status: SHIPPED | OUTPATIENT
Start: 2022-11-17 | End: 2024-02-21 | Stop reason: SDUPTHER

## 2022-11-17 NOTE — PROGRESS NOTES
PCP: Munir Garcia MD    Referring Provider:     Subjective:   Anuradha Godfrey is a 57 y.o. female,nonsmoker, with hx of HTN, CAD status post multivessel PCI to LAD, circumflex, RCA in February of 2021, DM uncontrolled, 4/13/21  A1C  11.5,  PAD, left above knee amputation, who presents for follow up     Pt reports no further chest pain. She reports elevated blood pressure when in pain.     Denies SOB, orthopnea, syncope, palpitations or dizziness.      Family history of vascular disease and cancer.     Denies history of smoking or alcohol abuse.     [facility] 2/2/21: Severe 3 vessel disease.   PCI to Prox LAD with a 3.0X23mm Xience Ya,  IVUS of the LADU                      Unsuccessful balloon angioplasty of the prox LCx, unable to deliver a stent proximally due to calcification. 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          2/9/21:  [facility]  PCI to RCA and PCI to LCx.   ECHO, [facility] 2020,  EF 50-55%.  Grade 1 diastolic dysfunction.         Past Surgical History:   Procedure Laterality Date    APPENDECTOMY Right     CARDIAC CATHETERIZATION  02/04/2021    PCI to prox LAD; IVUS of LAD    CHOLECYSTECTOMY      OOPHORECTOMY      TOTAL ABDOMINAL HYSTERECTOMY        Family History   Problem Relation Age of Onset    Heart disease Mother     Stroke Mother     Heart disease Brother       Social History     Socioeconomic History    Marital status: Single   Tobacco Use    Smoking status: Never    Smokeless tobacco: Never   Substance and Sexual Activity    Alcohol use: Not Currently    Drug use: Never    Sexual activity: Not Currently           Lab Results   Component Value Date     10/17/2022    K 3.8 10/17/2022     (H) 10/17/2022    CO2 27 10/17/2022    BUN 9 10/17/2022    CREATININE 0.62 10/17/2022    CALCIUM 8.6 10/17/2022    ANIONGAP 9 10/17/2022    ESTGFRAFRICA 157 04/13/2021    EGFRNONAA 102 06/20/2022       Lab Results   Component Value Date    CHOL 191 04/13/2021    CHOL 177  10/12/2020     Lab Results   Component Value Date    HDL 38 04/13/2021    HDL 34 10/12/2020     Lab Results   Component Value Date    LDLCALC 100 04/13/2021    LDLCALC 74 10/12/2020     Lab Results   Component Value Date    TRIG 263 04/13/2021    TRIG 345 10/12/2020     Lab Results   Component Value Date    CHOLHDL 5.2 10/12/2020       Lab Results   Component Value Date    WBC 10.12 09/24/2021    HGB 10.4 (L) 09/24/2021    HCT 35.2 (L) 09/24/2021    MCV 78.7 (L) 09/24/2021     09/24/2021           Current Outpatient Medications:     amitriptyline (ELAVIL) 25 MG tablet, Take 1 tablet (25 mg total) by mouth once daily., Disp: 180 tablet, Rfl: 1    amitriptyline (ELAVIL) 50 MG tablet, TAKE 1 TABLET BY MOUTH EACH EVENING, Disp: , Rfl:     aspirin (ECOTRIN) 81 MG EC tablet, Take 81 mg by mouth., Disp: , Rfl:     atorvastatin (LIPITOR) 80 MG tablet, Take 1 tablet (80 mg total) by mouth once daily., Disp: 90 tablet, Rfl: 1    blood sugar diagnostic Strp, 1 strip by Misc.(Non-Drug; Combo Route) route 3 (three) times daily., Disp: 200 strip, Rfl: 3    clopidogreL (PLAVIX) 75 mg tablet, Take 1 tablet (75 mg total) by mouth once daily., Disp: 90 tablet, Rfl: 1    dexlansoprazole (DEXILANT) 60 mg capsule, Take 1 capsule (60 mg total) by mouth once daily., Disp: 30 capsule, Rfl: 5    docusate sodium (COLACE) 50 MG capsule, Take by mouth., Disp: , Rfl:     ELIQUIS 2.5 mg Tab, Take 1 tablet (2.5 mg total) by mouth 2 (two) times daily., Disp: 60 tablet, Rfl: 1    EPINEPHrine (EPIPEN) 0.3 mg/0.3 mL AtIn, Inject 0.3 mLs (0.3 mg total) into the muscle once. for 1 dose, Disp: 0.3 mL, Rfl: 0    esomeprazole (NEXIUM) 40 MG capsule, Take 1 capsule (40 mg total) by mouth before breakfast., Disp: 90 capsule, Rfl: 1    eszopiclone (LUNESTA) 3 mg Tab, Take 1 tablet (3 mg total) by mouth every evening., Disp: 30 tablet, Rfl: 2    ezetimibe (ZETIA) 10 mg tablet, Take 1 tablet (10 mg total) by mouth once daily., Disp: 90 tablet, Rfl: 1     fosinopriL (MONOPRIL) 20 MG tablet, Take 1 tablet (20 mg total) by mouth once daily., Disp: 90 tablet, Rfl: 3    glimepiride (AMARYL) 2 MG tablet, Take 1 tablet (2 mg total) by mouth daily with breakfast., Disp: 90 tablet, Rfl: 1    HYDROcodone-acetaminophen (NORCO)  mg per tablet, TAKE 1 TABLET BY MOUTH 4 TIMES DAILY AS NEEDED ..MAY CAUSE DROWSINESS- AVOID ALCOHOL, Disp: , Rfl:     icosapent ethyL (VASCEPA) 1 gram Cap, Take 2 capsules (2 g total) by mouth 2 (two) times daily., Disp: 60 capsule, Rfl: 3    LANTUS SOLOSTAR U-100 INSULIN glargine 100 units/mL SubQ pen, Inject 35 Units into the skin every evening., Disp: 12 mL, Rfl: 4    methocarbamoL (ROBAXIN) 500 MG Tab, TAKE 1 TABLET BY MOUTH 3 TIMES DAILY WITH FOOD AS NEEDED ..MAY CAUSE DROWSINESS- AVOID ALCOHOL, Disp: , Rfl:     methocarbamoL (ROBAXIN) 750 MG Tab, TAKE 1 TABLET BY MOUTH 3 TIMES DAILY WITH FOOD AS NEEDED ..MAY CAUSE DROWSINESS- AVOID ALCOHOL, Disp: , Rfl:     metoprolol succinate (TOPROL-XL) 25 MG 24 hr tablet, Take 0.5 tablets (12.5 mg total) by mouth 2 (two) times daily., Disp: 90 tablet, Rfl: 1    morphine (MS CONTIN) 15 MG 12 hr tablet, TAKE 1 TABLET BY MOUTH EACH NIGHT AT BEDTIME ..MAY CAUSE DROWSINESS- AVOID ALCOHOL, Disp: , Rfl:     naproxen (NAPROSYN) 500 MG tablet, Take 1 tablet (500 mg total) by mouth 2 (two) times daily., Disp: 20 tablet, Rfl: 0    nitroGLYCERIN (NITROSTAT) 0.4 MG SL tablet, Place 1 tablet (0.4 mg total) under the tongue as needed for Chest pain., Disp: 25 tablet, Rfl: 2    NOVOLOG MIX 70-30FLEXPEN U-100 100 unit/mL (70-30) InPn pen, Inject 20 Units into the skin 2 (two) times a day., Disp: 12 mL, Rfl: 5    pioglitazone (ACTOS) 15 MG tablet, Take 1 tablet (15 mg total) by mouth once daily., Disp: 90 tablet, Rfl: 1    pregabalin (LYRICA) 200 MG Cap, TAKE 1 CAPSULE BY MOUTH 3 TIMES DAILY ..MAY CAUSE DROWSINESS- AVOID ALCOHOL, Disp: , Rfl:     promethazine (PHENERGAN) 50 MG tablet, Take 1 tablet (50 mg total) by mouth  "every 4 to 6 hours as needed for Nausea., Disp: 20 tablet, Rfl: 1    RESTASIS 0.05 % ophthalmic emulsion, PLACE 1 DROP IN EACH EYE TWICE A DAY, Disp: 60 each, Rfl: 2    UBRELVY 100 mg tablet, Take 1 tablet (100 mg total) by mouth daily as needed for Migraine., Disp: 30 tablet, Rfl: 1       Review of Systems   Constitutional:  Negative for chills.   Respiratory:  Negative for cough and shortness of breath.    Cardiovascular:  Negative for chest pain, palpitations, orthopnea, claudication, leg swelling and PND.   Neurological:  Negative for headaches.       Objective:   BP (!) 160/70   Pulse 103   Resp 18   Ht 5' 7" (1.702 m)   Wt 104.3 kg (230 lb)   BMI 36.02 kg/m²     Physical Exam  Constitutional:       Appearance: Normal appearance.   Eyes:      General: No scleral icterus.  Neck:      Vascular: No carotid bruit.   Cardiovascular:      Rate and Rhythm: Normal rate and regular rhythm.      Pulses: Normal pulses.      Heart sounds: Normal heart sounds. No murmur heard.  Pulmonary:      Effort: Pulmonary effort is normal.      Breath sounds: Normal breath sounds.   Musculoskeletal:         General: No swelling.      Right lower leg: No edema.      Left lower leg: No edema.      Comments: Left above knee amputation       Left Lower Extremity: Left leg is amputated above knee.   Neurological:      Mental Status: She is alert and oriented to person, place, and time.         Assessment:     1. Leg swelling        2. Benign essential HTN  [facility]    Hypertension Digital Medicine ([facility]): Assign Onboarding Questionnaires    Echo      3. Fatigue, unspecified type  TSH      4. Peripheral artery disease        5. Atherosclerosis of native coronary artery of native heart without angina pectoris        6. Sinus tachycardia              Plan:   Benign essential HTN  Uncontrolled  160/70  Increase fosinopril 20mg qd  Sign up for Digital BP monitoring.     Peripheral artery " disease  S/P left BKA  Follows Dr. García    Atherosclerosis of native coronary artery of native heart without angina pectoris  Multivessel PCI in February 2021  She is doing very well, denies any chest pains or dyspnea.  Aspirin 81mg (aspirin desensitization), Plavix and (Eliquis 2.5mg for her complex vascular issues )    Sinus tachycardia  Persistent sinus tach  HR in 103s  On metop 12.5mg bid  Will get ECHO    #Hyperlipidemia  Hypertriglyceridemia  [id number]   On atorvastatin   Zetia 10 mg daily.  Vascepa 2mg twice a day.    #Diabetes   Uncontrolled A1C 8.2  FUP in 6 months

## 2022-11-17 NOTE — ASSESSMENT & PLAN NOTE
Multivessel PCI in February 2021  She is doing very well, denies any chest pains or dyspnea.  Aspirin 81mg (aspirin desensitization), Plavix and (Eliquis 2.5mg for her complex vascular issues )

## 2022-11-18 RX ORDER — ESOMEPRAZOLE MAGNESIUM 40 MG/1
40 CAPSULE, DELAYED RELEASE ORAL
Qty: 90 CAPSULE | Refills: 1 | Status: SHIPPED | OUTPATIENT
Start: 2022-11-18 | End: 2023-11-18

## 2022-11-21 RX ORDER — DEXLANSOPRAZOLE 60 MG/1
1 CAPSULE, DELAYED RELEASE ORAL DAILY
Qty: 30 CAPSULE | Refills: 5 | Status: CANCELLED | OUTPATIENT
Start: 2022-11-21 | End: 2023-05-20

## 2022-11-21 RX ORDER — ESOMEPRAZOLE MAGNESIUM 40 MG/1
40 CAPSULE, DELAYED RELEASE ORAL
Qty: 30 CAPSULE | Refills: 11 | Status: SHIPPED | OUTPATIENT
Start: 2022-11-21 | End: 2023-12-11

## 2022-11-21 RX ORDER — DEXLANSOPRAZOLE 60 MG/1
1 CAPSULE, DELAYED RELEASE ORAL DAILY
Qty: 30 CAPSULE | Refills: 5 | Status: SHIPPED | OUTPATIENT
Start: 2022-11-21 | End: 2023-01-23 | Stop reason: SDUPTHER

## 2022-11-21 NOTE — TELEPHONE ENCOUNTER
----- Message from Tracie Aguilar sent at 11/21/2022  9:11 AM CST -----  Regarding: Patient did not receive correct items  We need to redo what we did on the 18th.      For her strips it has to be ordered as One Touch Ultra Blue Test or medicare will not pay and this goes to [facility] pharmacy.      Nexium does not work for her and it should have been Dexilant 60mg needs to be ordered at [facility].  She is very frustrated and is out of her Dexilant and needs this today.      Thank you for taking care of this for her.  I appreciate all of you.

## 2022-11-29 ENCOUNTER — HOSPITAL ENCOUNTER (OUTPATIENT)
Dept: CARDIOLOGY | Facility: HOSPITAL | Age: 57
Discharge: HOME | End: 2022-11-29
Attending: STUDENT IN AN ORGANIZED HEALTH CARE EDUCATION/TRAINING PROGRAM
Payer: MEDICARE

## 2022-11-29 ENCOUNTER — HOSPITAL ENCOUNTER (OUTPATIENT)
Dept: RADIOLOGY | Facility: HOSPITAL | Age: 57
Discharge: HOME | End: 2022-11-29
Payer: MEDICARE

## 2022-11-29 DIAGNOSIS — I10 BENIGN ESSENTIAL HTN: ICD-10-CM

## 2022-11-29 DIAGNOSIS — Z12.31 BREAST CANCER SCREENING BY MAMMOGRAM: ICD-10-CM

## 2022-11-29 PROCEDURE — 77067 SCR MAMMO BI INCL CAD: CPT | Mod: TC

## 2022-11-29 PROCEDURE — 93306 TTE W/DOPPLER COMPLETE: CPT

## 2022-11-29 PROCEDURE — 93306 TTE W/DOPPLER COMPLETE: CPT | Mod: 26,,, | Performed by: STUDENT IN AN ORGANIZED HEALTH CARE EDUCATION/TRAINING PROGRAM

## 2022-11-29 PROCEDURE — 93306 ECHO (CUPID ONLY): ICD-10-PCS | Mod: 26,,, | Performed by: STUDENT IN AN ORGANIZED HEALTH CARE EDUCATION/TRAINING PROGRAM

## 2022-12-01 ENCOUNTER — PATIENT MESSAGE (OUTPATIENT)
Dept: ADMINISTRATIVE | Facility: OTHER | Age: 57
End: 2022-12-01

## 2022-12-15 LAB
AORTIC ROOT ANNULUS: 2.6 CM
AORTIC VALVE CUSP SEPERATION: 1.98 CM
AV INDEX (PROSTH): 0.67
AV MEAN GRADIENT: 3 MMHG
AV PEAK GRADIENT: 6 MMHG
AV VALVE AREA: 1.7 CM2
AV VELOCITY RATIO: 0.75
CV ECHO LV RWT: 0.55 CM
DOP CALC AO PEAK VEL: 1.2 M/S
DOP CALC AO VTI: 21 CM
DOP CALC LVOT AREA: 2.5 CM2
DOP CALC LVOT DIAMETER: 1.8 CM
DOP CALC LVOT PEAK VEL: 0.9 M/S
DOP CALC LVOT STROKE VOLUME: 35.61 CM3
DOP CALCLVOT PEAK VEL VTI: 14 CM
E WAVE DECELERATION TIME: 175 MSEC
ECHO EF ESTIMATED: 40 %
ECHO LV POSTERIOR WALL: 1.23 CM (ref 0.6–1.1)
EJECTION FRACTION: 35 %
FRACTIONAL SHORTENING: 17 % (ref 28–44)
INTERVENTRICULAR SEPTUM: 1.46 CM (ref 0.6–1.1)
LEFT ATRIUM SIZE: 3 CM
LEFT INTERNAL DIMENSION IN SYSTOLE: 3.71 CM (ref 2.1–4)
LEFT VENTRICLE DIASTOLIC VOLUME: 91 ML
LEFT VENTRICLE SYSTOLIC VOLUME: 58.5 ML
LEFT VENTRICULAR INTERNAL DIMENSION IN DIASTOLE: 4.47 CM (ref 3.5–6)
LEFT VENTRICULAR MASS: 231.69 G
LVOT MG: 2 MMHG
MV PEAK E VEL: 0.85 M/S
PISA TR MAX VEL: 2.8 M/S
RA MAJOR: 4.2 CM
RA PRESSURE: 3 MMHG
RIGHT VENTRICULAR END-DIASTOLIC DIMENSION: 3.1 CM
TR MAX PG: 31 MMHG
TRICUSPID ANNULAR PLANE SYSTOLIC EXCURSION: 1.6 CM
TV REST PULMONARY ARTERY PRESSURE: 34 MMHG

## 2022-12-19 RX ORDER — METOPROLOL SUCCINATE 50 MG/1
50 TABLET, EXTENDED RELEASE ORAL DAILY
COMMUNITY
End: 2022-12-19 | Stop reason: SDUPTHER

## 2022-12-19 RX ORDER — METOPROLOL SUCCINATE 50 MG/1
50 TABLET, EXTENDED RELEASE ORAL DAILY
Qty: 90 TABLET | Refills: 1 | Status: SHIPPED | OUTPATIENT
Start: 2022-12-19 | End: 2023-01-23 | Stop reason: SDUPTHER

## 2023-01-18 RX ORDER — APIXABAN 2.5 MG/1
2.5 TABLET, FILM COATED ORAL 2 TIMES DAILY
Qty: 60 TABLET | Refills: 1 | Status: SHIPPED | OUTPATIENT
Start: 2023-01-18 | End: 2023-01-23 | Stop reason: SDUPTHER

## 2023-01-23 ENCOUNTER — OFFICE VISIT (OUTPATIENT)
Dept: FAMILY MEDICINE | Facility: CLINIC | Age: 58
End: 2023-01-23
Payer: MEDICARE

## 2023-01-23 VITALS
DIASTOLIC BLOOD PRESSURE: 94 MMHG | RESPIRATION RATE: 18 BRPM | SYSTOLIC BLOOD PRESSURE: 153 MMHG | BODY MASS INDEX: 36.02 KG/M2 | HEART RATE: 83 BPM | HEIGHT: 67 IN | OXYGEN SATURATION: 96 % | TEMPERATURE: 98 F

## 2023-01-23 DIAGNOSIS — I73.9 PAD (PERIPHERAL ARTERY DISEASE): ICD-10-CM

## 2023-01-23 DIAGNOSIS — E78.49 OTHER HYPERLIPIDEMIA: ICD-10-CM

## 2023-01-23 DIAGNOSIS — E11.69 TYPE 2 DIABETES MELLITUS WITH OTHER SPECIFIED COMPLICATION, WITHOUT LONG-TERM CURRENT USE OF INSULIN: Primary | ICD-10-CM

## 2023-01-23 DIAGNOSIS — E87.6 HYPOKALEMIA: ICD-10-CM

## 2023-01-23 DIAGNOSIS — J04.0 LARYNGITIS: ICD-10-CM

## 2023-01-23 DIAGNOSIS — F51.01 PRIMARY INSOMNIA: ICD-10-CM

## 2023-01-23 DIAGNOSIS — J06.9 UPPER RESPIRATORY TRACT INFECTION, UNSPECIFIED TYPE: ICD-10-CM

## 2023-01-23 DIAGNOSIS — Z11.59 NEED FOR HEPATITIS C SCREENING TEST: ICD-10-CM

## 2023-01-23 LAB
CHOLEST SERPL-MCNC: 213 MG/DL (ref 0–200)
CHOLEST/HDLC SERPL: 6.3 {RATIO}
EST. AVERAGE GLUCOSE BLD GHB EST-MCNC: 190 MG/DL
HBA1C MFR BLD HPLC: 8.3 % (ref 4.5–6.6)
HCV AB SER QL: NORMAL
HDLC SERPL-MCNC: 34 MG/DL (ref 40–60)
LDLC SERPL CALC-MCNC: 133 MG/DL
LDLC/HDLC SERPL: 3.9 {RATIO}
NONHDLC SERPL-MCNC: 179 MG/DL
POTASSIUM SERPL-SCNC: 3.9 MMOL/L (ref 3.5–5.1)
TRIGL SERPL-MCNC: 232 MG/DL (ref 35–150)
VLDLC SERPL-MCNC: 46 MG/DL

## 2023-01-23 PROCEDURE — 86803 HEPATITIS C ANTIBODY: ICD-10-PCS | Mod: ,,, | Performed by: CLINICAL MEDICAL LABORATORY

## 2023-01-23 PROCEDURE — 84132 ASSAY OF SERUM POTASSIUM: CPT | Mod: ,,, | Performed by: CLINICAL MEDICAL LABORATORY

## 2023-01-23 PROCEDURE — 80061 LIPID PANEL: ICD-10-PCS | Mod: ,,, | Performed by: CLINICAL MEDICAL LABORATORY

## 2023-01-23 PROCEDURE — 84132 POTASSIUM: ICD-10-PCS | Mod: ,,, | Performed by: CLINICAL MEDICAL LABORATORY

## 2023-01-23 PROCEDURE — 99214 PR OFFICE/OUTPT VISIT, EST, LEVL IV, 30-39 MIN: ICD-10-PCS | Mod: ,,, | Performed by: INTERNAL MEDICINE

## 2023-01-23 PROCEDURE — 83036 HEMOGLOBIN A1C: ICD-10-PCS | Mod: ,,, | Performed by: CLINICAL MEDICAL LABORATORY

## 2023-01-23 PROCEDURE — 86803 HEPATITIS C AB TEST: CPT | Mod: ,,, | Performed by: CLINICAL MEDICAL LABORATORY

## 2023-01-23 PROCEDURE — 83036 HEMOGLOBIN GLYCOSYLATED A1C: CPT | Mod: ,,, | Performed by: CLINICAL MEDICAL LABORATORY

## 2023-01-23 PROCEDURE — 99214 OFFICE O/P EST MOD 30 MIN: CPT | Mod: ,,, | Performed by: INTERNAL MEDICINE

## 2023-01-23 PROCEDURE — 80061 LIPID PANEL: CPT | Mod: ,,, | Performed by: CLINICAL MEDICAL LABORATORY

## 2023-01-23 RX ORDER — UBROGEPANT 100 MG/1
100 TABLET ORAL DAILY PRN
Qty: 90 TABLET | Refills: 1 | Status: SHIPPED | OUTPATIENT
Start: 2023-01-23 | End: 2023-06-08 | Stop reason: SDUPTHER

## 2023-01-23 RX ORDER — APIXABAN 2.5 MG/1
2.5 TABLET, FILM COATED ORAL 2 TIMES DAILY
Qty: 180 TABLET | Refills: 1 | Status: SHIPPED | OUTPATIENT
Start: 2023-01-23 | End: 2023-06-08 | Stop reason: SDUPTHER

## 2023-01-23 RX ORDER — PROMETHAZINE HYDROCHLORIDE 50 MG/1
50 TABLET ORAL
Qty: 60 TABLET | Refills: 1 | Status: SHIPPED | OUTPATIENT
Start: 2023-01-23 | End: 2023-06-08 | Stop reason: SDUPTHER

## 2023-01-23 RX ORDER — ATORVASTATIN CALCIUM 80 MG/1
80 TABLET, FILM COATED ORAL DAILY
Qty: 90 TABLET | Refills: 1 | Status: SHIPPED | OUTPATIENT
Start: 2023-01-23 | End: 2023-06-08 | Stop reason: SDUPTHER

## 2023-01-23 RX ORDER — GLIMEPIRIDE 2 MG/1
2 TABLET ORAL
Qty: 90 TABLET | Refills: 1 | Status: SHIPPED | OUTPATIENT
Start: 2023-01-23 | End: 2023-04-27 | Stop reason: SDUPTHER

## 2023-01-23 RX ORDER — ESZOPICLONE 3 MG/1
3 TABLET, FILM COATED ORAL NIGHTLY
Qty: 90 TABLET | Refills: 1 | Status: SHIPPED | OUTPATIENT
Start: 2023-01-23 | End: 2023-06-08 | Stop reason: SDUPTHER

## 2023-01-23 RX ORDER — SULFAMETHOXAZOLE AND TRIMETHOPRIM 800; 160 MG/1; MG/1
1 TABLET ORAL 2 TIMES DAILY
Qty: 20 TABLET | Refills: 0 | Status: SHIPPED | OUTPATIENT
Start: 2023-01-23 | End: 2024-03-20 | Stop reason: ALTCHOICE

## 2023-01-23 RX ORDER — DEXLANSOPRAZOLE 60 MG/1
1 CAPSULE, DELAYED RELEASE ORAL DAILY
Qty: 30 CAPSULE | Refills: 5 | Status: SHIPPED | OUTPATIENT
Start: 2023-01-23 | End: 2023-06-08 | Stop reason: SDUPTHER

## 2023-01-23 RX ORDER — METOPROLOL SUCCINATE 50 MG/1
50 TABLET, EXTENDED RELEASE ORAL DAILY
Qty: 90 TABLET | Refills: 1 | Status: SHIPPED | OUTPATIENT
Start: 2023-01-23 | End: 2023-06-08 | Stop reason: SDUPTHER

## 2023-01-23 RX ORDER — NITROGLYCERIN 0.4 MG/1
0.4 TABLET SUBLINGUAL
Qty: 25 TABLET | Refills: 2 | Status: SHIPPED | OUTPATIENT
Start: 2023-01-23 | End: 2023-04-27 | Stop reason: SDUPTHER

## 2023-01-23 RX ORDER — CLOPIDOGREL BISULFATE 75 MG/1
75 TABLET ORAL DAILY
Qty: 90 TABLET | Refills: 1 | Status: SHIPPED | OUTPATIENT
Start: 2023-01-23 | End: 2023-06-08 | Stop reason: SDUPTHER

## 2023-01-23 RX ORDER — CYCLOSPORINE 0.5 MG/ML
EMULSION OPHTHALMIC
Qty: 60 EACH | Refills: 3 | Status: SHIPPED | OUTPATIENT
Start: 2023-01-23 | End: 2023-06-08 | Stop reason: SDUPTHER

## 2023-01-23 RX ORDER — PIOGLITAZONEHYDROCHLORIDE 15 MG/1
15 TABLET ORAL DAILY
Qty: 90 TABLET | Refills: 1 | Status: SHIPPED | OUTPATIENT
Start: 2023-01-23 | End: 2023-06-08 | Stop reason: SDUPTHER

## 2023-01-23 NOTE — PATIENT INSTRUCTIONS
Anuradha was seen today for medication refill.    Diagnoses and all orders for this visit:    Type 2 diabetes mellitus with other specified complication, without long-term current use of insulin  -     Hemoglobin A1C; Future  -     Hemoglobin A1C    Other hyperlipidemia  -     Lipid Panel; Future  -     Lipid Panel    Need for hepatitis C screening test  -     Hepatitis C Antibody; Future  -     Hepatitis C Antibody    Hypokalemia  -     Potassium    Laryngitis    Primary insomnia    PAD (peripheral artery disease)    Upper respiratory tract infection, unspecified type    Other orders  -     UBRELVY 100 mg tablet; Take 1 tablet (100 mg total) by mouth daily as needed for Migraine.  -     RESTASIS 0.05 % ophthalmic emulsion; PLACE 1 DROP IN EACH EYE TWICE A DAY  -     promethazine (PHENERGAN) 50 MG tablet; Take 1 tablet (50 mg total) by mouth every 4 to 6 hours as needed for Nausea.  -     pioglitazone (ACTOS) 15 MG tablet; Take 1 tablet (15 mg total) by mouth once daily.  -     nitroGLYCERIN (NITROSTAT) 0.4 MG SL tablet; Place 1 tablet (0.4 mg total) under the tongue as needed for Chest pain.  -     metoprolol succinate (TOPROL-XL) 50 MG 24 hr tablet; Take 1 tablet (50 mg total) by mouth once daily.  -     glimepiride (AMARYL) 2 MG tablet; Take 1 tablet (2 mg total) by mouth daily with breakfast.  -     eszopiclone (LUNESTA) 3 mg Tab; Take 1 tablet (3 mg total) by mouth every evening.  -     ELIQUIS 2.5 mg Tab; Take 1 tablet (2.5 mg total) by mouth 2 (two) times daily.  -     dexlansoprazole (DEXILANT) 60 mg capsule; Take 1 capsule (60 mg total) by mouth once daily.  -     clopidogreL (PLAVIX) 75 mg tablet; Take 1 tablet (75 mg total) by mouth once daily.  -     blood sugar diagnostic Strp; 1 strip by Misc.(Non-Drug; Combo Route) route 3 (three) times daily.  -     atorvastatin (LIPITOR) 80 MG tablet; Take 1 tablet (80 mg total) by mouth once daily.  -     sulfamethoxazole-trimethoprim 800-160mg (BACTRIM DS) 800-160  mg Tab; Take 1 tablet by mouth 2 (two) times daily.

## 2023-01-23 NOTE — PROGRESS NOTES
Subjective:       Patient ID: Anuradha Godfrey is a 57 y.o. female.    Chief Complaint: Medication Refill    Patient is here today for a follow up evaluation. Patient blood pressure is increased today on intake, 153/94. Patient has a complaint of hoarseness and laryngitis. Patient states the left side of her throat is sore. Patient throat appears red on exam. Recommended to patient to gargle with warm salt water and chloraseptic spray.  Will order Bactrim DS 1 PO BID #20. Patient is requesting medication refills. Will refill today. Will follow with patient in 4 months.     Health maintenance discussed with patient. Patient refused Shingles and Flu vaccine. Will order Hep C screening, A1C, and Lipid Panel.     Current Medications:    Current Outpatient Medications:     amitriptyline (ELAVIL) 25 MG tablet, Take 1 tablet (25 mg total) by mouth once daily., Disp: 180 tablet, Rfl: 1    amitriptyline (ELAVIL) 50 MG tablet, TAKE 1 TABLET BY MOUTH EACH EVENING, Disp: , Rfl:     aspirin (ECOTRIN) 81 MG EC tablet, Take 81 mg by mouth., Disp: , Rfl:     docusate sodium (COLACE) 50 MG capsule, Take by mouth., Disp: , Rfl:     fosinopriL (MONOPRIL) 20 MG tablet, Take 1 tablet (20 mg total) by mouth once daily., Disp: 90 tablet, Rfl: 3    HYDROcodone-acetaminophen (NORCO)  mg per tablet, TAKE 1 TABLET BY MOUTH 4 TIMES DAILY AS NEEDED ..MAY CAUSE DROWSINESS- AVOID ALCOHOL, Disp: , Rfl:     LANTUS SOLOSTAR U-100 INSULIN glargine 100 units/mL SubQ pen, Inject 35 Units into the skin every evening., Disp: 12 mL, Rfl: 4    methocarbamoL (ROBAXIN) 750 MG Tab, TAKE 1 TABLET BY MOUTH 3 TIMES DAILY WITH FOOD AS NEEDED ..MAY CAUSE DROWSINESS- AVOID ALCOHOL, Disp: , Rfl:     naproxen (NAPROSYN) 500 MG tablet, Take 1 tablet (500 mg total) by mouth 2 (two) times daily., Disp: 20 tablet, Rfl: 0    NOVOLOG MIX 70-30FLEXPEN U-100 100 unit/mL (70-30) InPn pen, Inject 20 Units into the skin 2 (two) times a day., Disp: 12 mL, Rfl: 5     pregabalin (LYRICA) 200 MG Cap, TAKE 1 CAPSULE BY MOUTH 3 TIMES DAILY ..MAY CAUSE DROWSINESS- AVOID ALCOHOL, Disp: , Rfl:     atorvastatin (LIPITOR) 80 MG tablet, Take 1 tablet (80 mg total) by mouth once daily., Disp: 90 tablet, Rfl: 1    blood sugar diagnostic Strp, 1 strip by Misc.(Non-Drug; Combo Route) route 3 (three) times daily., Disp: 200 strip, Rfl: 6    clopidogreL (PLAVIX) 75 mg tablet, Take 1 tablet (75 mg total) by mouth once daily., Disp: 90 tablet, Rfl: 1    dexlansoprazole (DEXILANT) 60 mg capsule, Take 1 capsule (60 mg total) by mouth once daily., Disp: 30 capsule, Rfl: 5    ELIQUIS 2.5 mg Tab, Take 1 tablet (2.5 mg total) by mouth 2 (two) times daily., Disp: 180 tablet, Rfl: 1    EPINEPHrine (EPIPEN) 0.3 mg/0.3 mL AtIn, Inject 0.3 mLs (0.3 mg total) into the muscle once. for 1 dose, Disp: 0.3 mL, Rfl: 0    esomeprazole (NEXIUM) 40 MG capsule, Take 1 capsule (40 mg total) by mouth before breakfast. (Patient not taking: Reported on 1/23/2023), Disp: 90 capsule, Rfl: 1    esomeprazole (NEXIUM) 40 MG capsule, Take 1 capsule (40 mg total) by mouth before breakfast. (Patient not taking: Reported on 1/23/2023), Disp: 90 capsule, Rfl: 1    esomeprazole (NEXIUM) 40 MG capsule, Take 1 capsule (40 mg total) by mouth before breakfast. (Patient not taking: Reported on 1/23/2023), Disp: 30 capsule, Rfl: 11    eszopiclone (LUNESTA) 3 mg Tab, Take 1 tablet (3 mg total) by mouth every evening., Disp: 90 tablet, Rfl: 1    ezetimibe (ZETIA) 10 mg tablet, Take 1 tablet (10 mg total) by mouth once daily. (Patient not taking: Reported on 1/23/2023), Disp: 90 tablet, Rfl: 1    glimepiride (AMARYL) 2 MG tablet, Take 1 tablet (2 mg total) by mouth daily with breakfast., Disp: 90 tablet, Rfl: 1    icosapent ethyL (VASCEPA) 1 gram Cap, Take 2 capsules (2 g total) by mouth 2 (two) times daily. (Patient not taking: Reported on 1/23/2023), Disp: 60 capsule, Rfl: 3    methocarbamoL (ROBAXIN) 500 MG Tab, TAKE 1 TABLET BY MOUTH 3  TIMES DAILY WITH FOOD AS NEEDED ..MAY CAUSE DROWSINESS- AVOID ALCOHOL, Disp: , Rfl:     metoprolol succinate (TOPROL-XL) 50 MG 24 hr tablet, Take 1 tablet (50 mg total) by mouth once daily., Disp: 90 tablet, Rfl: 1    morphine (MS CONTIN) 15 MG 12 hr tablet, TAKE 1 TABLET BY MOUTH EACH NIGHT AT BEDTIME ..MAY CAUSE DROWSINESS- AVOID ALCOHOL, Disp: , Rfl:     nitroGLYCERIN (NITROSTAT) 0.4 MG SL tablet, Place 1 tablet (0.4 mg total) under the tongue as needed for Chest pain., Disp: 25 tablet, Rfl: 2    pioglitazone (ACTOS) 15 MG tablet, Take 1 tablet (15 mg total) by mouth once daily., Disp: 90 tablet, Rfl: 1    promethazine (PHENERGAN) 50 MG tablet, Take 1 tablet (50 mg total) by mouth every 4 to 6 hours as needed for Nausea., Disp: 60 tablet, Rfl: 1    RESTASIS 0.05 % ophthalmic emulsion, PLACE 1 DROP IN EACH EYE TWICE A DAY, Disp: 60 each, Rfl: 3    sulfamethoxazole-trimethoprim 800-160mg (BACTRIM DS) 800-160 mg Tab, Take 1 tablet by mouth 2 (two) times daily., Disp: 20 tablet, Rfl: 0    UBRELVY 100 mg tablet, Take 1 tablet (100 mg total) by mouth daily as needed for Migraine., Disp: 90 tablet, Rfl: 1    Last Labs:     No visits with results within 1 Month(s) from this visit.   Latest known visit with results is:   Hospital Outpatient Visit on 11/29/2022   Component Date Value    Right Atrial Pressure (f* 11/29/2022 3     EF 11/29/2022 35     Left Ventricular Outflow* 11/29/2022 2.00     AORTIC VALVE CUSP SEPERA* 11/29/2022 1.98     LVIDd 11/29/2022 4.47     IVS 11/29/2022 1.46 (A)     Posterior Wall 11/29/2022 1.23 (A)     Ao root annulus 11/29/2022 2.60     LVIDs 11/29/2022 3.71     FS 11/29/2022 17     LV mass 11/29/2022 231.69     LA size 11/29/2022 3.00     RVDD 11/29/2022 3.10     TAPSE 11/29/2022 1.60     Left Ventricle Relative * 11/29/2022 0.55     AV mean gradient 11/29/2022 3     AV valve area 11/29/2022 1.70     AV Velocity Ratio 11/29/2022 0.75     AV index (prosthetic) 11/29/2022 0.67     E wave  deceleration time 11/29/2022 175.00     LVOT diameter 11/29/2022 1.80     LVOT area 11/29/2022 2.5     LVOT peak sg 11/29/2022 0.9     LVOT peak VTI 11/29/2022 14.00     Ao peak sg 11/29/2022 1.2     Ao VTI 11/29/2022 21.00     LVOT stroke volume 11/29/2022 35.61     AV peak gradient 11/29/2022 6     TV rest pulmonary artery* 11/29/2022 34     MV Peak E Sg 11/29/2022 0.85     TR Max Sg 11/29/2022 2.80     LV Systolic Volume 11/29/2022 58.50     LV Diastolic Volume 11/29/2022 91.00     Echo EF Estimated 11/29/2022 40     RA Major Axis 11/29/2022 4.20     Triscuspid Valve Regurgi* 11/29/2022 31        Last Imaging:  Echo  · The left ventricle is normal in size with mild concentric hypertrophy   and moderately decreased systolic function.  · The estimated ejection fraction is 35%.  · Left ventricular diastolic dysfunction.  · Normal right ventricular size with normal right ventricular systolic   function.  · Mild right atrial enlargement.  · Moderate mitral regurgitation.  · Normal central venous pressure (3 mmHg).  · The estimated PA systolic pressure is 34 mmHg.            Review of Systems   HENT:  Positive for sore throat.         HOARSENESS    All other systems reviewed and are negative.      Objective:      Physical Exam  Vitals reviewed.   Constitutional:       Appearance: Normal appearance. She is normal weight.   Cardiovascular:      Rate and Rhythm: Normal rate and regular rhythm.      Pulses: Normal pulses.      Heart sounds: Normal heart sounds.   Pulmonary:      Effort: Pulmonary effort is normal.      Breath sounds: Normal breath sounds.   Abdominal:      General: Abdomen is flat. Bowel sounds are normal.      Palpations: Abdomen is soft.   Musculoskeletal:         General: Normal range of motion.      Cervical back: Normal range of motion and neck supple.   Skin:     General: Skin is warm and dry.   Neurological:      General: No focal deficit present.      Mental Status: She is alert and oriented to  person, place, and time. Mental status is at baseline.       Assessment:       1. Type 2 diabetes mellitus with other specified complication, without long-term current use of insulin  Hemoglobin A1C    Hemoglobin A1C      2. Other hyperlipidemia  Lipid Panel    Lipid Panel      3. Need for hepatitis C screening test  Hepatitis C Antibody    Hepatitis C Antibody      4. Hypokalemia  Potassium      5. Laryngitis        6. Primary insomnia        7. PAD (peripheral artery disease)        8. Upper respiratory tract infection, unspecified type             Plan:         Anuradha was seen today for medication refill.    Diagnoses and all orders for this visit:    Type 2 diabetes mellitus with other specified complication, without long-term current use of insulin  -     Hemoglobin A1C; Future  -     Hemoglobin A1C    Other hyperlipidemia  -     Lipid Panel; Future  -     Lipid Panel    Need for hepatitis C screening test  -     Hepatitis C Antibody; Future  -     Hepatitis C Antibody    Hypokalemia  -     Potassium    Laryngitis    Primary insomnia    PAD (peripheral artery disease)    Upper respiratory tract infection, unspecified type    Other orders  -     UBRELVY 100 mg tablet; Take 1 tablet (100 mg total) by mouth daily as needed for Migraine.  -     RESTASIS 0.05 % ophthalmic emulsion; PLACE 1 DROP IN EACH EYE TWICE A DAY  -     promethazine (PHENERGAN) 50 MG tablet; Take 1 tablet (50 mg total) by mouth every 4 to 6 hours as needed for Nausea.  -     pioglitazone (ACTOS) 15 MG tablet; Take 1 tablet (15 mg total) by mouth once daily.  -     nitroGLYCERIN (NITROSTAT) 0.4 MG SL tablet; Place 1 tablet (0.4 mg total) under the tongue as needed for Chest pain.  -     metoprolol succinate (TOPROL-XL) 50 MG 24 hr tablet; Take 1 tablet (50 mg total) by mouth once daily.  -     glimepiride (AMARYL) 2 MG tablet; Take 1 tablet (2 mg total) by mouth daily with breakfast.  -     eszopiclone (LUNESTA) 3 mg Tab; Take 1 tablet (3 mg  total) by mouth every evening.  -     ELIQUIS 2.5 mg Tab; Take 1 tablet (2.5 mg total) by mouth 2 (two) times daily.  -     dexlansoprazole (DEXILANT) 60 mg capsule; Take 1 capsule (60 mg total) by mouth once daily.  -     clopidogreL (PLAVIX) 75 mg tablet; Take 1 tablet (75 mg total) by mouth once daily.  -     blood sugar diagnostic Strp; 1 strip by Misc.(Non-Drug; Combo Route) route 3 (three) times daily.  -     atorvastatin (LIPITOR) 80 MG tablet; Take 1 tablet (80 mg total) by mouth once daily.  -     sulfamethoxazole-trimethoprim 800-160mg (BACTRIM DS) 800-160 mg Tab; Take 1 tablet by mouth 2 (two) times daily.

## 2023-02-10 ENCOUNTER — PATIENT MESSAGE (OUTPATIENT)
Dept: FAMILY MEDICINE | Facility: CLINIC | Age: 58
End: 2023-02-10
Payer: MEDICARE

## 2023-03-28 ENCOUNTER — PATIENT MESSAGE (OUTPATIENT)
Dept: FAMILY MEDICINE | Facility: CLINIC | Age: 58
End: 2023-03-28
Payer: MEDICARE

## 2023-04-27 NOTE — TELEPHONE ENCOUNTER
----- Message from Naomy Cheema sent at 4/27/2023  1:47 PM CDT -----    PT  ASK TO GET MEDS SENT      LANTUS SOLOSTAR U-100 INSULIN glargine 100 units/mL SubQ pen        glimepiride (AMARYL) 2 MG tablet               nitroGLYCERIN (NITROSTAT) 0.4 MG SL tablet          Delfino Torres [place], [place] - [address] [place] [address]      [phone]

## 2023-05-01 RX ORDER — GLIMEPIRIDE 2 MG/1
2 TABLET ORAL
Qty: 90 TABLET | Refills: 1 | Status: SHIPPED | OUTPATIENT
Start: 2023-05-01 | End: 2024-01-30 | Stop reason: SDUPTHER

## 2023-05-01 RX ORDER — INSULIN GLARGINE 100 [IU]/ML
35 INJECTION, SOLUTION SUBCUTANEOUS NIGHTLY
Qty: 12 ML | Refills: 4 | Status: SHIPPED | OUTPATIENT
Start: 2023-05-01 | End: 2023-06-08 | Stop reason: SDUPTHER

## 2023-05-01 RX ORDER — GLIMEPIRIDE 2 MG/1
2 TABLET ORAL
Qty: 90 TABLET | Refills: 1 | Status: SHIPPED | OUTPATIENT
Start: 2023-05-01 | End: 2023-06-08 | Stop reason: SDUPTHER

## 2023-05-01 RX ORDER — INSULIN GLARGINE 100 [IU]/ML
35 INJECTION, SOLUTION SUBCUTANEOUS NIGHTLY
Qty: 12 ML | Refills: 4 | Status: SHIPPED | OUTPATIENT
Start: 2023-05-01 | End: 2023-05-24 | Stop reason: SDUPTHER

## 2023-05-01 RX ORDER — NITROGLYCERIN 0.4 MG/1
0.4 TABLET SUBLINGUAL
Qty: 25 TABLET | Refills: 2 | Status: SHIPPED | OUTPATIENT
Start: 2023-05-01 | End: 2023-07-12

## 2023-05-01 RX ORDER — NITROGLYCERIN 0.4 MG/1
0.4 TABLET SUBLINGUAL
Qty: 25 TABLET | Refills: 2 | Status: SHIPPED | OUTPATIENT
Start: 2023-05-01 | End: 2023-06-08 | Stop reason: SDUPTHER

## 2023-05-01 RX ORDER — INSULIN GLARGINE 100 [IU]/ML
INJECTION, SOLUTION SUBCUTANEOUS
Qty: 18 ML | Refills: 0 | Status: SHIPPED | OUTPATIENT
Start: 2023-05-01 | End: 2023-06-08 | Stop reason: SDUPTHER

## 2023-05-09 DIAGNOSIS — Z71.89 COMPLEX CARE COORDINATION: ICD-10-CM

## 2023-05-24 ENCOUNTER — OFFICE VISIT (OUTPATIENT)
Dept: FAMILY MEDICINE | Facility: CLINIC | Age: 58
End: 2023-05-24
Payer: MEDICARE

## 2023-05-24 VITALS
SYSTOLIC BLOOD PRESSURE: 108 MMHG | HEART RATE: 102 BPM | DIASTOLIC BLOOD PRESSURE: 67 MMHG | HEIGHT: 67 IN | WEIGHT: 232 LBS | TEMPERATURE: 98 F | BODY MASS INDEX: 36.41 KG/M2 | RESPIRATION RATE: 18 BRPM | OXYGEN SATURATION: 97 %

## 2023-05-24 DIAGNOSIS — K21.9 GASTROESOPHAGEAL REFLUX DISEASE, UNSPECIFIED WHETHER ESOPHAGITIS PRESENT: ICD-10-CM

## 2023-05-24 DIAGNOSIS — E11.69 TYPE 2 DIABETES MELLITUS WITH OTHER SPECIFIED COMPLICATION, WITHOUT LONG-TERM CURRENT USE OF INSULIN: Primary | ICD-10-CM

## 2023-05-24 DIAGNOSIS — E66.9 CLASS 2 OBESITY WITH BODY MASS INDEX (BMI) OF 35.0 TO 35.9 IN ADULT, UNSPECIFIED OBESITY TYPE, UNSPECIFIED WHETHER SERIOUS COMORBIDITY PRESENT: ICD-10-CM

## 2023-05-24 DIAGNOSIS — J30.1 NON-SEASONAL ALLERGIC RHINITIS DUE TO POLLEN: ICD-10-CM

## 2023-05-24 DIAGNOSIS — E03.8 TSH DEFICIENCY: ICD-10-CM

## 2023-05-24 LAB
ANION GAP SERPL CALCULATED.3IONS-SCNC: 7 MMOL/L (ref 7–16)
BUN SERPL-MCNC: 12 MG/DL (ref 7–18)
BUN/CREAT SERPL: 17 (ref 6–20)
CALCIUM SERPL-MCNC: 9.6 MG/DL (ref 8.5–10.1)
CHLORIDE SERPL-SCNC: 106 MMOL/L (ref 98–107)
CO2 SERPL-SCNC: 28 MMOL/L (ref 21–32)
CREAT SERPL-MCNC: 0.69 MG/DL (ref 0.55–1.02)
EGFR (NO RACE VARIABLE) (RUSH/TITUS): 101 ML/MIN/1.73M2
EST. AVERAGE GLUCOSE BLD GHB EST-MCNC: 224 MG/DL
GLUCOSE SERPL-MCNC: 221 MG/DL (ref 74–106)
HBA1C MFR BLD HPLC: 9.3 % (ref 4.5–6.6)
POTASSIUM SERPL-SCNC: 4.2 MMOL/L (ref 3.5–5.1)
SODIUM SERPL-SCNC: 137 MMOL/L (ref 136–145)
TSH SERPL DL<=0.005 MIU/L-ACNC: 0.87 UIU/ML (ref 0.36–3.74)

## 2023-05-24 PROCEDURE — 80048 BASIC METABOLIC PNL TOTAL CA: CPT | Mod: ,,, | Performed by: CLINICAL MEDICAL LABORATORY

## 2023-05-24 PROCEDURE — 83036 HEMOGLOBIN GLYCOSYLATED A1C: CPT | Mod: ,,, | Performed by: CLINICAL MEDICAL LABORATORY

## 2023-05-24 PROCEDURE — 80048 BASIC METABOLIC PANEL: ICD-10-PCS | Mod: ,,, | Performed by: CLINICAL MEDICAL LABORATORY

## 2023-05-24 PROCEDURE — 84443 ASSAY THYROID STIM HORMONE: CPT | Mod: ,,, | Performed by: CLINICAL MEDICAL LABORATORY

## 2023-05-24 PROCEDURE — 99214 PR OFFICE/OUTPT VISIT, EST, LEVL IV, 30-39 MIN: ICD-10-PCS | Mod: ,,, | Performed by: INTERNAL MEDICINE

## 2023-05-24 PROCEDURE — 99214 OFFICE O/P EST MOD 30 MIN: CPT | Mod: ,,, | Performed by: INTERNAL MEDICINE

## 2023-05-24 PROCEDURE — 84443 TSH: ICD-10-PCS | Mod: ,,, | Performed by: CLINICAL MEDICAL LABORATORY

## 2023-05-24 PROCEDURE — 83036 HEMOGLOBIN A1C: ICD-10-PCS | Mod: ,,, | Performed by: CLINICAL MEDICAL LABORATORY

## 2023-05-24 RX ORDER — DEXLANSOPRAZOLE 60 MG/1
1 CAPSULE, DELAYED RELEASE ORAL DAILY
Qty: 30 CAPSULE | Refills: 5 | Status: CANCELLED | OUTPATIENT
Start: 2023-05-24

## 2023-05-24 RX ORDER — DICLOFENAC SODIUM 10 MG/G
GEL TOPICAL
COMMUNITY
Start: 2023-05-11

## 2023-05-24 NOTE — PROGRESS NOTES
Care gaps discussed with patient. Patient refused Covid vaccine. Patient refused pneumonia vaccine. Patient is due for HIV screening, will hold off for now. Patient states she received tetanus vaccine about 4-5 years ago. Patient refused shingles vaccine. Patient is due for urine screening, patient states she will hold off for now. Patient is due for A1C, will order today. Patient states her eye exam was about 6 months and she has an appointment with her Retina Specialist soon.     Can not find patient in [facility] to document tetanus vaccine.

## 2023-05-25 ENCOUNTER — TELEPHONE (OUTPATIENT)
Dept: FAMILY MEDICINE | Facility: CLINIC | Age: 58
End: 2023-05-25
Payer: MEDICARE

## 2023-05-25 NOTE — TELEPHONE ENCOUNTER
----- Message from Munir Garcia MD sent at 5/25/2023  9:25 AM CDT -----  Need to see Tuesday   Abnormal hemoglobin A1c     1227 Call made to pt regarding above message; pt refused to come for follow up regarding her abnormal hemoglobin A1c; pt states she has already looked at her lab levels and is satisfied with the level; states she will continue to monitor blood sugars daily; pt also stated that if anything changes she will come in for visit with Dr Garcia but for now she will keep her 4 month follow up

## 2023-05-30 ENCOUNTER — PATIENT OUTREACH (OUTPATIENT)
Dept: ADMINISTRATIVE | Facility: HOSPITAL | Age: 58
End: 2023-05-30

## 2023-05-30 NOTE — PROGRESS NOTES
LIZETH sent to Dr. Piper ([facility]) for most recent eye exam.

## 2023-05-30 NOTE — LETTER
AUTHORIZATION FOR RELEASE OF   CONFIDENTIAL INFORMATION    Dear Dr. Piper,    We are seeing Anuradha Godfrey, date of birth 1965, in the clinic at [facility]. Munir Garcia MD is the patient's PCP. Anuradha Godfrey has an outstanding lab/procedure at the time we reviewed her chart. In order to help keep her health information updated, she has authorized us to request the following medical record(s):        (  )  MAMMOGRAM                                      (  )  COLONOSCOPY      (  )  PAP SMEAR                                          (  )  OUTSIDE LAB RESULTS     (  )  DEXA SCAN                                          ( x )  EYE EXAM            (  )  FOOT EXAM                                          (  )  ENTIRE RECORD     (  )  OUTSIDE IMMUNIZATIONS                 (  )  _______________         Please fax records to Paulie Cheema LPN Care Coordinator at [phone].     If you have any questions, please contact Paulie at [phone].          Patient Name: Anuradha Godfrey  : 1965  Patient Phone #: [phone]

## 2023-06-01 ENCOUNTER — PATIENT OUTREACH (OUTPATIENT)
Dept: ADMINISTRATIVE | Facility: HOSPITAL | Age: 58
End: 2023-06-01

## 2023-06-01 ENCOUNTER — TELEPHONE (OUTPATIENT)
Dept: FAMILY MEDICINE | Facility: CLINIC | Age: 58
End: 2023-06-01
Payer: MEDICARE

## 2023-06-01 NOTE — TELEPHONE ENCOUNTER
----- Message from Eri Clayton sent at 5/31/2023  4:32 PM CDT -----  Pt states that she saw Dr. Garcia last week and the pharmacy still hasn't received it. States that her sinuses are starting to bleed. [phone] Call made to pt regarding above message; pt states she needs her medicines called in to her pharmacy by tomorrow; informed pt Dr Garcia has not sent medications to pharmacy yet; pt voiced understanding and has no further questions at this time

## 2023-06-08 ENCOUNTER — OFFICE VISIT (OUTPATIENT)
Dept: FAMILY MEDICINE | Facility: CLINIC | Age: 58
End: 2023-06-08
Payer: MEDICARE

## 2023-06-08 VITALS
HEART RATE: 94 BPM | DIASTOLIC BLOOD PRESSURE: 60 MMHG | TEMPERATURE: 98 F | BODY MASS INDEX: 35.94 KG/M2 | RESPIRATION RATE: 18 BRPM | SYSTOLIC BLOOD PRESSURE: 136 MMHG | HEIGHT: 67 IN | WEIGHT: 229 LBS | OXYGEN SATURATION: 97 %

## 2023-06-08 DIAGNOSIS — I10 ESSENTIAL (PRIMARY) HYPERTENSION: ICD-10-CM

## 2023-06-08 DIAGNOSIS — E11.9 TYPE 2 DIABETES MELLITUS WITHOUT COMPLICATION, WITHOUT LONG-TERM CURRENT USE OF INSULIN: ICD-10-CM

## 2023-06-08 DIAGNOSIS — J30.1 NON-SEASONAL ALLERGIC RHINITIS DUE TO POLLEN: Primary | ICD-10-CM

## 2023-06-08 DIAGNOSIS — E66.9 CLASS 2 OBESITY WITH BODY MASS INDEX (BMI) OF 35.0 TO 35.9 IN ADULT, UNSPECIFIED OBESITY TYPE, UNSPECIFIED WHETHER SERIOUS COMORBIDITY PRESENT: ICD-10-CM

## 2023-06-08 PROCEDURE — 99214 OFFICE O/P EST MOD 30 MIN: CPT | Mod: ,,, | Performed by: INTERNAL MEDICINE

## 2023-06-08 PROCEDURE — 99214 PR OFFICE/OUTPT VISIT, EST, LEVL IV, 30-39 MIN: ICD-10-PCS | Mod: ,,, | Performed by: INTERNAL MEDICINE

## 2023-06-08 NOTE — PROGRESS NOTES
Care gaps discussed with patient. Patient refused Covid vaccine. Patient refused pneumonia vaccine. Patient refused HIV screening. Patient refused tetanus vaccine. Patient refused shingles vaccine. Patient is due for urine screening, will address during next visit. Patient states her eye exam was Tuesday.

## 2023-06-12 PROBLEM — E66.9 CLASS 2 OBESITY WITH BODY MASS INDEX (BMI) OF 35.0 TO 35.9 IN ADULT: Status: ACTIVE | Noted: 2023-06-12

## 2023-06-12 PROBLEM — J30.1 NON-SEASONAL ALLERGIC RHINITIS DUE TO POLLEN: Status: ACTIVE | Noted: 2023-06-12

## 2023-06-12 PROBLEM — E66.812 CLASS 2 OBESITY WITH BODY MASS INDEX (BMI) OF 35.0 TO 35.9 IN ADULT: Status: ACTIVE | Noted: 2023-06-12

## 2023-06-12 RX ORDER — ESZOPICLONE 3 MG/1
3 TABLET, FILM COATED ORAL NIGHTLY
Qty: 30 TABLET | Refills: 1 | Status: SHIPPED | OUTPATIENT
Start: 2023-06-12 | End: 2023-08-02

## 2023-06-12 RX ORDER — CYCLOSPORINE 0.5 MG/ML
EMULSION OPHTHALMIC
Qty: 60 EACH | Refills: 3 | Status: SHIPPED | OUTPATIENT
Start: 2023-06-12 | End: 2023-07-04

## 2023-06-12 RX ORDER — UBROGEPANT 100 MG/1
100 TABLET ORAL DAILY PRN
Qty: 90 TABLET | Refills: 1 | Status: SHIPPED | OUTPATIENT
Start: 2023-06-12 | End: 2024-01-30 | Stop reason: SDUPTHER

## 2023-06-12 RX ORDER — METOPROLOL SUCCINATE 50 MG/1
50 TABLET, EXTENDED RELEASE ORAL DAILY
Qty: 30 TABLET | Refills: 1 | Status: SHIPPED | OUTPATIENT
Start: 2023-06-12 | End: 2023-08-02

## 2023-06-12 RX ORDER — PROMETHAZINE HYDROCHLORIDE 50 MG/1
50 TABLET ORAL
Qty: 60 TABLET | Refills: 1 | Status: SHIPPED | OUTPATIENT
Start: 2023-06-12 | End: 2024-01-30 | Stop reason: SDUPTHER

## 2023-06-12 RX ORDER — DEXLANSOPRAZOLE 60 MG/1
1 CAPSULE, DELAYED RELEASE ORAL DAILY
Qty: 30 CAPSULE | Refills: 5 | Status: ON HOLD | OUTPATIENT
Start: 2023-06-12 | End: 2024-03-22 | Stop reason: HOSPADM

## 2023-06-12 RX ORDER — INSULIN GLARGINE 100 [IU]/ML
35 INJECTION, SOLUTION SUBCUTANEOUS NIGHTLY
Qty: 12 ML | Refills: 4 | Status: SHIPPED | OUTPATIENT
Start: 2023-06-12 | End: 2024-03-20 | Stop reason: SDUPTHER

## 2023-06-12 RX ORDER — INSULIN ASPART 100 [IU]/ML
20 INJECTION, SUSPENSION SUBCUTANEOUS 2 TIMES DAILY
Qty: 12 ML | Refills: 5 | Status: SHIPPED | OUTPATIENT
Start: 2023-06-12 | End: 2023-09-26 | Stop reason: SDUPTHER

## 2023-06-12 RX ORDER — INSULIN GLARGINE 100 [IU]/ML
INJECTION, SOLUTION SUBCUTANEOUS
Qty: 18 ML | Refills: 4 | Status: SHIPPED | OUTPATIENT
Start: 2023-06-12 | End: 2024-03-20 | Stop reason: SDUPTHER

## 2023-06-12 RX ORDER — PIOGLITAZONEHYDROCHLORIDE 15 MG/1
15 TABLET ORAL DAILY
Qty: 90 TABLET | Refills: 1 | Status: SHIPPED | OUTPATIENT
Start: 2023-06-12 | End: 2024-03-20 | Stop reason: SDUPTHER

## 2023-06-12 RX ORDER — APIXABAN 2.5 MG/1
2.5 TABLET, FILM COATED ORAL 2 TIMES DAILY
Qty: 180 TABLET | Refills: 1 | Status: SHIPPED | OUTPATIENT
Start: 2023-06-12 | End: 2024-03-20 | Stop reason: SDUPTHER

## 2023-06-12 RX ORDER — ATORVASTATIN CALCIUM 80 MG/1
80 TABLET, FILM COATED ORAL DAILY
Qty: 30 TABLET | Refills: 1 | Status: SHIPPED | OUTPATIENT
Start: 2023-06-12 | End: 2023-08-29

## 2023-06-12 RX ORDER — ASPIRIN 81 MG/1
81 TABLET ORAL DAILY
Qty: 30 TABLET | Refills: 1 | Status: ON HOLD | OUTPATIENT
Start: 2023-06-12 | End: 2024-03-22 | Stop reason: HOSPADM

## 2023-06-12 RX ORDER — CLOPIDOGREL BISULFATE 75 MG/1
75 TABLET ORAL DAILY
Qty: 30 TABLET | Refills: 1 | Status: SHIPPED | OUTPATIENT
Start: 2023-06-12 | End: 2023-08-29

## 2023-06-12 RX ORDER — FLUTICASONE PROPIONATE 50 MCG
1 SPRAY, SUSPENSION (ML) NASAL DAILY
Qty: 16 G | Refills: 1 | Status: SHIPPED | OUTPATIENT
Start: 2023-06-12 | End: 2024-03-20 | Stop reason: SDUPTHER

## 2023-06-12 RX ORDER — AMOXICILLIN 500 MG/1
500 CAPSULE ORAL 3 TIMES DAILY
Qty: 21 CAPSULE | Refills: 0 | Status: SHIPPED | OUTPATIENT
Start: 2023-06-12 | End: 2024-03-20 | Stop reason: ALTCHOICE

## 2023-06-12 RX ORDER — GLIMEPIRIDE 2 MG/1
2 TABLET ORAL
Qty: 30 TABLET | Refills: 1 | Status: SHIPPED | OUTPATIENT
Start: 2023-06-12 | End: 2023-08-29

## 2023-06-12 RX ORDER — LIDOCAINE HYDROCHLORIDE 20 MG/ML
SOLUTION OROPHARYNGEAL 3 TIMES DAILY
Qty: 100 ML | Refills: 0 | Status: SHIPPED | OUTPATIENT
Start: 2023-06-12 | End: 2024-03-20

## 2023-06-12 RX ORDER — NITROGLYCERIN 0.4 MG/1
0.4 TABLET SUBLINGUAL
Qty: 25 TABLET | Refills: 2 | Status: SHIPPED | OUTPATIENT
Start: 2023-06-12 | End: 2024-03-20 | Stop reason: SDUPTHER

## 2023-06-12 NOTE — PROGRESS NOTES
Subjective:       Patient ID: Anuradha Godfrey is a 57 y.o. female.    Chief Complaint: Otalgia, Sore Throat (Throat is irritating ), and Medication Problem  57-year-old lady presents complaining of runny nose stuffy nose, she complains of ear pain she also complains of having a history of chronic hypertension and chronic diabetes.  She also stated problems have been hurting did examine her gums I did not see any signs of infection however I did see some redness in the right ear no purulent drainage she did have some wax in the ear  Prophylactically will start a amoxicillin 500 mg 3 times a day, start viscous lidocaine 5 mL t.i.d. swish and spit x5 days.  She also needs several meds refilled which will be happy to do   Aspirin Lipitor Plavix and Dexilant  Otalgia   Associated symptoms include a sore throat. Pertinent negatives include no abdominal pain.   Sore Throat   Associated symptoms include ear pain. Pertinent negatives include no abdominal pain or shortness of breath.   .    Current Medications:    Current Outpatient Medications:     amitriptyline (ELAVIL) 25 MG tablet, Take 1 tablet (25 mg total) by mouth once daily., Disp: 180 tablet, Rfl: 1    amitriptyline (ELAVIL) 50 MG tablet, TAKE 1 TABLET BY MOUTH EACH EVENING, Disp: , Rfl:     diclofenac sodium (VOLTAREN) 1 % Gel, Apply topically., Disp: , Rfl:     docusate sodium (COLACE) 50 MG capsule, Take by mouth., Disp: , Rfl:     EPINEPHrine (EPIPEN) 0.3 mg/0.3 mL AtIn, Inject 0.3 mLs (0.3 mg total) into the muscle once. for 1 dose, Disp: 0.3 mL, Rfl: 0    esomeprazole (NEXIUM) 40 MG capsule, Take 1 capsule (40 mg total) by mouth before breakfast., Disp: 30 capsule, Rfl: 11    ezetimibe (ZETIA) 10 mg tablet, Take 1 tablet (10 mg total) by mouth once daily., Disp: 90 tablet, Rfl: 1    fosinopriL (MONOPRIL) 20 MG tablet, Take 1 tablet (20 mg total) by mouth once daily., Disp: 90 tablet, Rfl: 3    glimepiride (AMARYL) 2 MG tablet, Take 1 tablet (2 mg total) by  mouth daily with breakfast., Disp: 90 tablet, Rfl: 1    HYDROcodone-acetaminophen (NORCO)  mg per tablet, TAKE 1 TABLET BY MOUTH 4 TIMES DAILY AS NEEDED ..MAY CAUSE DROWSINESS- AVOID ALCOHOL, Disp: , Rfl:     methocarbamoL (ROBAXIN) 500 MG Tab, TAKE 1 TABLET BY MOUTH 3 TIMES DAILY WITH FOOD AS NEEDED ..MAY CAUSE DROWSINESS- AVOID ALCOHOL, Disp: , Rfl:     methocarbamoL (ROBAXIN) 750 MG Tab, TAKE 1 TABLET BY MOUTH 3 TIMES DAILY WITH FOOD AS NEEDED ..MAY CAUSE DROWSINESS- AVOID ALCOHOL, Disp: , Rfl:     morphine (MS CONTIN) 15 MG 12 hr tablet, TAKE 1 TABLET BY MOUTH EACH NIGHT AT BEDTIME ..MAY CAUSE DROWSINESS- AVOID ALCOHOL, Disp: , Rfl:     naproxen (NAPROSYN) 500 MG tablet, Take 1 tablet (500 mg total) by mouth 2 (two) times daily., Disp: 20 tablet, Rfl: 0    pregabalin (LYRICA) 200 MG Cap, TAKE 1 CAPSULE BY MOUTH 3 TIMES DAILY ..MAY CAUSE DROWSINESS- AVOID ALCOHOL, Disp: , Rfl:     amoxicillin (AMOXIL) 500 MG capsule, Take 1 capsule (500 mg total) by mouth 3 (three) times daily., Disp: 21 capsule, Rfl: 0    aspirin (ECOTRIN) 81 MG EC tablet, Take 1 tablet (81 mg total) by mouth once daily., Disp: 30 tablet, Rfl: 1    atorvastatin (LIPITOR) 80 MG tablet, Take 1 tablet (80 mg total) by mouth once daily., Disp: 30 tablet, Rfl: 1    blood sugar diagnostic Strp, 1 strip by Misc.(Non-Drug; Combo Route) route 3 (three) times daily., Disp: 200 strip, Rfl: 6    clopidogreL (PLAVIX) 75 mg tablet, Take 1 tablet (75 mg total) by mouth once daily., Disp: 30 tablet, Rfl: 1    dexlansoprazole (DEXILANT) 60 mg capsule, Take 1 capsule (60 mg total) by mouth once daily., Disp: 30 capsule, Rfl: 5    ELIQUIS 2.5 mg Tab, Take 1 tablet (2.5 mg total) by mouth 2 (two) times daily., Disp: 180 tablet, Rfl: 1    esomeprazole (NEXIUM) 40 MG capsule, Take 1 capsule (40 mg total) by mouth before breakfast. (Patient not taking: Reported on 1/23/2023), Disp: 90 capsule, Rfl: 1    esomeprazole (NEXIUM) 40 MG capsule, Take 1 capsule (40 mg  total) by mouth before breakfast. (Patient not taking: Reported on 1/23/2023), Disp: 90 capsule, Rfl: 1    eszopiclone (LUNESTA) 3 mg Tab, Take 1 tablet (3 mg total) by mouth every evening., Disp: 30 tablet, Rfl: 1    fluticasone propionate (FLONASE) 50 mcg/actuation nasal spray, 1 spray (50 mcg total) by Each Nostril route once daily., Disp: 16 g, Rfl: 1    glimepiride (AMARYL) 2 MG tablet, Take 1 tablet (2 mg total) by mouth daily with breakfast., Disp: 30 tablet, Rfl: 1    icosapent ethyL (VASCEPA) 1 gram Cap, Take 2 capsules (2 g total) by mouth 2 (two) times daily. (Patient not taking: Reported on 6/8/2023), Disp: 60 capsule, Rfl: 3    insulin glargine 100 units/mL SubQ pen, INJECT 35 UNITS UNDER THE SKIN EACH EVENING ...KEEP REFRIGERATED, Disp: 18 mL, Rfl: 4    LANTUS SOLOSTAR U-100 INSULIN glargine 100 units/mL SubQ pen, Inject 35 Units into the skin every evening. (Patient not taking: Reported on 6/8/2023), Disp: 12 mL, Rfl: 4    LANTUS SOLOSTAR U-100 INSULIN glargine 100 units/mL SubQ pen, Inject 35 Units into the skin every evening., Disp: 12 mL, Rfl: 4    LIDOcaine HCl 2% (LIDOCAINE VISCOUS) 2 % Soln, by Mucous Membrane route 3 (three) times daily., Disp: 100 mL, Rfl: 0    metoprolol succinate (TOPROL-XL) 50 MG 24 hr tablet, Take 1 tablet (50 mg total) by mouth once daily., Disp: 30 tablet, Rfl: 1    nitroGLYCERIN (NITROSTAT) 0.4 MG SL tablet, Place 1 tablet (0.4 mg total) under the tongue as needed for Chest pain. (Patient not taking: Reported on 6/8/2023), Disp: 25 tablet, Rfl: 2    nitroGLYCERIN (NITROSTAT) 0.4 MG SL tablet, Place 1 tablet (0.4 mg total) under the tongue as needed for Chest pain., Disp: 25 tablet, Rfl: 2    NOVOLOG MIX 70-30FLEXPEN U-100 100 unit/mL (70-30) InPn pen, Inject 20 Units into the skin 2 (two) times a day., Disp: 12 mL, Rfl: 5    pioglitazone (ACTOS) 15 MG tablet, Take 1 tablet (15 mg total) by mouth once daily., Disp: 90 tablet, Rfl: 1    promethazine (PHENERGAN) 50 MG  "tablet, Take 1 tablet (50 mg total) by mouth every 4 to 6 hours as needed for Nausea., Disp: 60 tablet, Rfl: 1    RESTASIS 0.05 % ophthalmic emulsion, PLACE 1 DROP IN EACH EYE TWICE A DAY, Disp: 60 each, Rfl: 3    sulfamethoxazole-trimethoprim 800-160mg (BACTRIM DS) 800-160 mg Tab, Take 1 tablet by mouth 2 (two) times daily. (Patient not taking: Reported on 6/8/2023), Disp: 20 tablet, Rfl: 0    UBRELVY 100 mg tablet, Take 1 tablet (100 mg total) by mouth daily as needed for Migraine., Disp: 90 tablet, Rfl: 1           Review of Systems   Constitutional:  Negative for appetite change, fatigue and fever.   HENT:  Positive for ear pain and sore throat.    Respiratory:  Negative for shortness of breath.    Cardiovascular:  Negative for chest pain.   Gastrointestinal:  Negative for abdominal pain and constipation.   Endocrine: Negative for polydipsia, polyphagia and polyuria.   Genitourinary:  Negative for difficulty urinating, frequency and hot flashes.   Allergic/Immunologic: Negative for environmental allergies.   Neurological:  Negative for dizziness and light-headedness.   Psychiatric/Behavioral:  Negative for agitation.               Vitals:    06/08/23 1003   BP: 136/60   BP Location: Right arm   Patient Position: Sitting   BP Method: Large (Automatic)   Pulse: 94   Resp: 18   Temp: 97.5 °F (36.4 °C)   TempSrc: Temporal   SpO2: 97%   Weight: 103.9 kg (229 lb)   Height: 5' 7" (1.702 m)        Physical Exam  Vitals and nursing note reviewed.   Constitutional:       Appearance: Normal appearance. She is obese.   Cardiovascular:      Rate and Rhythm: Normal rate and regular rhythm.      Pulses: Normal pulses.      Heart sounds: Normal heart sounds.   Pulmonary:      Effort: Pulmonary effort is normal.      Breath sounds: Normal breath sounds.   Abdominal:      General: Abdomen is flat. Bowel sounds are normal.      Palpations: Abdomen is soft.   Musculoskeletal:         General: Normal range of motion.   Skin:     " General: Skin is warm and dry.   Neurological:      General: No focal deficit present.      Mental Status: She is alert and oriented to person, place, and time. Mental status is at baseline.         Last Labs:     Patient Outreach on 06/01/2023   Component Date Value    Left Eye DM Retinopathy 02/17/2022 Positive     Right Eye DM Retinopathy 02/17/2022 Positive    Office Visit on 05/24/2023   Component Date Value    Sodium 05/24/2023 137     Potassium 05/24/2023 4.2     Chloride 05/24/2023 106     CO2 05/24/2023 28     Anion Gap 05/24/2023 7     Glucose 05/24/2023 221 (H)     BUN 05/24/2023 12     Creatinine 05/24/2023 0.69     BUN/Creatinine Ratio 05/24/2023 17     Calcium 05/24/2023 9.6     eGFR 05/24/2023 101     Hemoglobin A1C 05/24/2023 9.3 (H)     Estimated Average Glucose 05/24/2023 224     TSH 05/24/2023 0.869        Last Imaging:  Echo  · The left ventricle is normal in size with mild concentric hypertrophy   and moderately decreased systolic function.  · The estimated ejection fraction is 35%.  · Left ventricular diastolic dysfunction.  · Normal right ventricular size with normal right ventricular systolic   function.  · Mild right atrial enlargement.  · Moderate mitral regurgitation.  · Normal central venous pressure (3 mmHg).  · The estimated PA systolic pressure is 34 mmHg.            **Labs and x-rays personally reviewed by me    ** reviewed      Objective:        Assessment:       1. Non-seasonal allergic rhinitis due to pollen        2. Class 2 obesity with body mass index (BMI) of 35.0 to 35.9 in adult, unspecified obesity type, unspecified whether serious comorbidity present        3. Type 2 diabetes mellitus without complication, without long-term current use of insulin        4. Essential (primary) hypertension             Plan:         [unfilled]

## 2023-06-21 RX ORDER — METOPROLOL SUCCINATE 50 MG/1
50 TABLET, EXTENDED RELEASE ORAL DAILY
Qty: 90 TABLET | Refills: 1 | Status: SHIPPED | OUTPATIENT
Start: 2023-06-21 | End: 2023-09-26 | Stop reason: SDUPTHER

## 2023-06-21 RX ORDER — FLUTICASONE PROPIONATE 50 MCG
1 SPRAY, SUSPENSION (ML) NASAL DAILY
Qty: 16 G | Refills: 6 | Status: SHIPPED | OUTPATIENT
Start: 2023-06-21 | End: 2024-02-29

## 2023-06-21 RX ORDER — INSULIN ASPART 100 [IU]/ML
20 INJECTION, SUSPENSION SUBCUTANEOUS 2 TIMES DAILY
Qty: 12 ML | Refills: 12 | Status: SHIPPED | OUTPATIENT
Start: 2023-06-21

## 2023-06-21 RX ORDER — UBROGEPANT 100 MG/1
100 TABLET ORAL DAILY PRN
Qty: 90 TABLET | Refills: 1 | Status: SHIPPED | OUTPATIENT
Start: 2023-06-21 | End: 2023-09-26 | Stop reason: SDUPTHER

## 2023-06-21 RX ORDER — PROMETHAZINE HYDROCHLORIDE 50 MG/1
50 TABLET ORAL
Qty: 60 TABLET | Refills: 1 | Status: SHIPPED | OUTPATIENT
Start: 2023-06-21 | End: 2023-08-09

## 2023-06-21 RX ORDER — INSULIN GLARGINE 100 [IU]/ML
35 INJECTION, SOLUTION SUBCUTANEOUS NIGHTLY
Qty: 12 ML | Refills: 12 | Status: SHIPPED | OUTPATIENT
Start: 2023-06-21 | End: 2024-03-20 | Stop reason: SDUPTHER

## 2023-06-21 RX ORDER — PIOGLITAZONEHYDROCHLORIDE 15 MG/1
15 TABLET ORAL DAILY
Qty: 90 TABLET | Refills: 1 | Status: SHIPPED | OUTPATIENT
Start: 2023-06-21 | End: 2023-09-26 | Stop reason: SDUPTHER

## 2023-06-21 RX ORDER — INSULIN GLARGINE 100 [IU]/ML
35 INJECTION, SOLUTION SUBCUTANEOUS NIGHTLY
Qty: 12 ML | Refills: 4 | Status: SHIPPED | OUTPATIENT
Start: 2023-06-21 | End: 2023-09-26 | Stop reason: SDUPTHER

## 2023-06-21 RX ORDER — CLOPIDOGREL BISULFATE 75 MG/1
75 TABLET ORAL DAILY
Qty: 90 TABLET | Refills: 1 | Status: SHIPPED | OUTPATIENT
Start: 2023-06-21 | End: 2024-01-30 | Stop reason: SDUPTHER

## 2023-06-21 RX ORDER — ATORVASTATIN CALCIUM 80 MG/1
80 TABLET, FILM COATED ORAL DAILY
Qty: 90 TABLET | Refills: 1 | Status: SHIPPED | OUTPATIENT
Start: 2023-06-21 | End: 2023-09-26 | Stop reason: SDUPTHER

## 2023-06-21 RX ORDER — APIXABAN 2.5 MG/1
2.5 TABLET, FILM COATED ORAL 2 TIMES DAILY
Qty: 180 TABLET | Refills: 1 | Status: SHIPPED | OUTPATIENT
Start: 2023-06-21 | End: 2023-09-26 | Stop reason: SDUPTHER

## 2023-06-26 PROBLEM — K21.9 GASTROESOPHAGEAL REFLUX DISEASE: Status: ACTIVE | Noted: 2023-06-26

## 2023-06-26 PROBLEM — E03.8 TSH DEFICIENCY: Status: ACTIVE | Noted: 2021-09-24

## 2023-06-26 NOTE — PROGRESS NOTES
Subjective:       Patient ID: Anuradha Godfrey is a 57 y.o. female.    Chief Complaint: Medication Refill, Sinus Problem, and Pain (All over body pain )  57-year-old female with chronic type 2 diabetes mellitus blood sugar at home has been as high as 449.  Patient also has chronic dyslipidemia, chronic GERD with intermittent reflux patient also has chronic allergic rhinitis and patient is obese.    The plan BNP hemoglobin A1c check a TSH for the obesity   Change Lantus to 35 units subQ q.h.s..  Increase insulin NovoLog to 20 units b.i.d., also Flonase 1 spray each nostril daily.    Patient should see me again in approximately 1 month.  Medication Refill  Pertinent negatives include no abdominal pain, chest pain, fatigue or fever.   Sinus Problem  Pertinent negatives include no shortness of breath.   Pain  Pertinent negatives include no abdominal pain, chest pain, fatigue or fever.   .    Current Medications:    Current Outpatient Medications:     amitriptyline (ELAVIL) 25 MG tablet, Take 1 tablet (25 mg total) by mouth once daily., Disp: 180 tablet, Rfl: 1    amitriptyline (ELAVIL) 50 MG tablet, TAKE 1 TABLET BY MOUTH EACH EVENING, Disp: , Rfl:     diclofenac sodium (VOLTAREN) 1 % Gel, Apply topically., Disp: , Rfl:     docusate sodium (COLACE) 50 MG capsule, Take by mouth., Disp: , Rfl:     esomeprazole (NEXIUM) 40 MG capsule, Take 1 capsule (40 mg total) by mouth before breakfast., Disp: 30 capsule, Rfl: 11    ezetimibe (ZETIA) 10 mg tablet, Take 1 tablet (10 mg total) by mouth once daily., Disp: 90 tablet, Rfl: 1    fosinopriL (MONOPRIL) 20 MG tablet, Take 1 tablet (20 mg total) by mouth once daily., Disp: 90 tablet, Rfl: 3    glimepiride (AMARYL) 2 MG tablet, Take 1 tablet (2 mg total) by mouth daily with breakfast., Disp: 90 tablet, Rfl: 1    HYDROcodone-acetaminophen (NORCO)  mg per tablet, TAKE 1 TABLET BY MOUTH 4 TIMES DAILY AS NEEDED ..MAY CAUSE DROWSINESS- AVOID ALCOHOL, Disp: , Rfl:     methocarbamoL  (ROBAXIN) 500 MG Tab, TAKE 1 TABLET BY MOUTH 3 TIMES DAILY WITH FOOD AS NEEDED ..MAY CAUSE DROWSINESS- AVOID ALCOHOL, Disp: , Rfl:     methocarbamoL (ROBAXIN) 750 MG Tab, TAKE 1 TABLET BY MOUTH 3 TIMES DAILY WITH FOOD AS NEEDED ..MAY CAUSE DROWSINESS- AVOID ALCOHOL, Disp: , Rfl:     morphine (MS CONTIN) 15 MG 12 hr tablet, TAKE 1 TABLET BY MOUTH EACH NIGHT AT BEDTIME ..MAY CAUSE DROWSINESS- AVOID ALCOHOL, Disp: , Rfl:     naproxen (NAPROSYN) 500 MG tablet, Take 1 tablet (500 mg total) by mouth 2 (two) times daily., Disp: 20 tablet, Rfl: 0    nitroGLYCERIN (NITROSTAT) 0.4 MG SL tablet, Place 1 tablet (0.4 mg total) under the tongue as needed for Chest pain. (Patient not taking: Reported on 6/8/2023), Disp: 25 tablet, Rfl: 2    pregabalin (LYRICA) 200 MG Cap, TAKE 1 CAPSULE BY MOUTH 3 TIMES DAILY ..MAY CAUSE DROWSINESS- AVOID ALCOHOL, Disp: , Rfl:     amoxicillin (AMOXIL) 500 MG capsule, Take 1 capsule (500 mg total) by mouth 3 (three) times daily., Disp: 21 capsule, Rfl: 0    aspirin (ECOTRIN) 81 MG EC tablet, Take 1 tablet (81 mg total) by mouth once daily., Disp: 30 tablet, Rfl: 1    atorvastatin (LIPITOR) 80 MG tablet, Take 1 tablet (80 mg total) by mouth once daily., Disp: 90 tablet, Rfl: 1    atorvastatin (LIPITOR) 80 MG tablet, Take 1 tablet (80 mg total) by mouth once daily., Disp: 30 tablet, Rfl: 1    blood sugar diagnostic Strp, 1 strip by Misc.(Non-Drug; Combo Route) route 3 (three) times daily., Disp: 200 strip, Rfl: 6    blood sugar diagnostic Strp, 1 strip by Misc.(Non-Drug; Combo Route) route 3 (three) times daily., Disp: 200 strip, Rfl: 6    clopidogreL (PLAVIX) 75 mg tablet, Take 1 tablet (75 mg total) by mouth once daily., Disp: 90 tablet, Rfl: 1    clopidogreL (PLAVIX) 75 mg tablet, Take 1 tablet (75 mg total) by mouth once daily., Disp: 30 tablet, Rfl: 1    dexlansoprazole (DEXILANT) 60 mg capsule, Take 1 capsule (60 mg total) by mouth once daily., Disp: 30 capsule, Rfl: 5    ELIQUIS 2.5 mg Tab,  Take 1 tablet (2.5 mg total) by mouth 2 (two) times daily., Disp: 180 tablet, Rfl: 1    ELIQUIS 2.5 mg Tab, Take 1 tablet (2.5 mg total) by mouth 2 (two) times daily., Disp: 180 tablet, Rfl: 1    EPINEPHrine (EPIPEN) 0.3 mg/0.3 mL AtIn, Inject 0.3 mLs (0.3 mg total) into the muscle once. for 1 dose, Disp: 0.3 mL, Rfl: 0    esomeprazole (NEXIUM) 40 MG capsule, Take 1 capsule (40 mg total) by mouth before breakfast. (Patient not taking: Reported on 1/23/2023), Disp: 90 capsule, Rfl: 1    esomeprazole (NEXIUM) 40 MG capsule, Take 1 capsule (40 mg total) by mouth before breakfast. (Patient not taking: Reported on 1/23/2023), Disp: 90 capsule, Rfl: 1    eszopiclone (LUNESTA) 3 mg Tab, Take 1 tablet (3 mg total) by mouth every evening., Disp: 30 tablet, Rfl: 1    fluticasone propionate (FLONASE) 50 mcg/actuation nasal spray, 1 spray (50 mcg total) by Each Nostril route once daily., Disp: 16 g, Rfl: 6    fluticasone propionate (FLONASE) 50 mcg/actuation nasal spray, 1 spray (50 mcg total) by Each Nostril route once daily., Disp: 16 g, Rfl: 1    glimepiride (AMARYL) 2 MG tablet, Take 1 tablet (2 mg total) by mouth daily with breakfast., Disp: 30 tablet, Rfl: 1    icosapent ethyL (VASCEPA) 1 gram Cap, Take 2 capsules (2 g total) by mouth 2 (two) times daily. (Patient not taking: Reported on 6/8/2023), Disp: 60 capsule, Rfl: 3    insulin glargine 100 units/mL SubQ pen, INJECT 35 UNITS UNDER THE SKIN EACH EVENING ...KEEP REFRIGERATED, Disp: 18 mL, Rfl: 4    LANTUS SOLOSTAR U-100 INSULIN glargine 100 units/mL SubQ pen, Inject 35 Units into the skin every evening., Disp: 12 mL, Rfl: 12    LANTUS SOLOSTAR U-100 INSULIN glargine 100 units/mL SubQ pen, Inject 35 Units into the skin every evening., Disp: 12 mL, Rfl: 4    LANTUS SOLOSTAR U-100 INSULIN glargine 100 units/mL SubQ pen, Inject 35 Units into the skin every evening., Disp: 12 mL, Rfl: 4    LIDOcaine HCl 2% (LIDOCAINE VISCOUS) 2 % Soln, by Mucous Membrane route 3 (three)  times daily., Disp: 100 mL, Rfl: 0    metoprolol succinate (TOPROL-XL) 50 MG 24 hr tablet, Take 1 tablet (50 mg total) by mouth once daily., Disp: 90 tablet, Rfl: 1    metoprolol succinate (TOPROL-XL) 50 MG 24 hr tablet, Take 1 tablet (50 mg total) by mouth once daily., Disp: 30 tablet, Rfl: 1    nitroGLYCERIN (NITROSTAT) 0.4 MG SL tablet, Place 1 tablet (0.4 mg total) under the tongue as needed for Chest pain., Disp: 25 tablet, Rfl: 2    NOVOLOG MIX 70-30FLEXPEN U-100 100 unit/mL (70-30) InPn pen, Inject 20 Units into the skin 2 (two) times a day., Disp: 12 mL, Rfl: 12    NOVOLOG MIX 70-30FLEXPEN U-100 100 unit/mL (70-30) InPn pen, Inject 20 Units into the skin 2 (two) times a day., Disp: 12 mL, Rfl: 5    pioglitazone (ACTOS) 15 MG tablet, Take 1 tablet (15 mg total) by mouth once daily., Disp: 90 tablet, Rfl: 1    pioglitazone (ACTOS) 15 MG tablet, Take 1 tablet (15 mg total) by mouth once daily., Disp: 90 tablet, Rfl: 1    promethazine (PHENERGAN) 50 MG tablet, Take 1 tablet (50 mg total) by mouth every 4 to 6 hours as needed for Nausea., Disp: 60 tablet, Rfl: 1    promethazine (PHENERGAN) 50 MG tablet, Take 1 tablet (50 mg total) by mouth every 4 to 6 hours as needed for Nausea., Disp: 60 tablet, Rfl: 1    RESTASIS 0.05 % ophthalmic emulsion, PLACE 1 DROP IN EACH EYE TWICE A DAY, Disp: 60 each, Rfl: 3    sulfamethoxazole-trimethoprim 800-160mg (BACTRIM DS) 800-160 mg Tab, Take 1 tablet by mouth 2 (two) times daily. (Patient not taking: Reported on 6/8/2023), Disp: 20 tablet, Rfl: 0    UBRELVY 100 mg tablet, Take 1 tablet (100 mg total) by mouth daily as needed for Migraine., Disp: 90 tablet, Rfl: 1    UBRELVY 100 mg tablet, Take 1 tablet (100 mg total) by mouth daily as needed for Migraine., Disp: 90 tablet, Rfl: 1           Review of Systems   Constitutional:  Negative for appetite change, fatigue and fever.   Respiratory:  Negative for shortness of breath.    Cardiovascular:  Negative for chest pain.  "  Gastrointestinal:  Positive for reflux. Negative for abdominal pain and constipation.   Endocrine: Negative for polydipsia, polyphagia and polyuria.   Genitourinary:  Negative for difficulty urinating, frequency and hot flashes.   Allergic/Immunologic: Negative for environmental allergies.   Neurological:  Negative for dizziness and light-headedness.   Psychiatric/Behavioral:  Negative for agitation.               Vitals:    05/24/23 0833   BP: 108/67   BP Location: Right arm   Patient Position: Sitting   BP Method: Large (Automatic)   Pulse: 102   Resp: 18   Temp: 97.7 °F (36.5 °C)   TempSrc: Temporal   SpO2: 97%   Weight: 105.2 kg (232 lb)   Height: 5' 7" (1.702 m)        Physical Exam  Vitals and nursing note reviewed.   Constitutional:       Appearance: Normal appearance. She is obese.   Cardiovascular:      Rate and Rhythm: Normal rate and regular rhythm.      Pulses: Normal pulses.      Heart sounds: Normal heart sounds.   Pulmonary:      Effort: Pulmonary effort is normal.      Breath sounds: Normal breath sounds.   Abdominal:      General: Abdomen is flat. Bowel sounds are normal.      Palpations: Abdomen is soft.   Musculoskeletal:         General: Normal range of motion.   Skin:     General: Skin is warm and dry.   Neurological:      General: No focal deficit present.      Mental Status: She is alert and oriented to person, place, and time. Mental status is at baseline.         Last Labs:     Office Visit on 05/24/2023   Component Date Value    Sodium 05/24/2023 137     Potassium 05/24/2023 4.2     Chloride 05/24/2023 106     CO2 05/24/2023 28     Anion Gap 05/24/2023 7     Glucose 05/24/2023 221 (H)     BUN 05/24/2023 12     Creatinine 05/24/2023 0.69     BUN/Creatinine Ratio 05/24/2023 17     Calcium 05/24/2023 9.6     eGFR 05/24/2023 101     Hemoglobin A1C 05/24/2023 9.3 (H)     Estimated Average Glucose 05/24/2023 224     TSH 05/24/2023 0.869        Last Imaging:  Echo  · The left ventricle is normal " in size with mild concentric hypertrophy   and moderately decreased systolic function.  · The estimated ejection fraction is 35%.  · Left ventricular diastolic dysfunction.  · Normal right ventricular size with normal right ventricular systolic   function.  · Mild right atrial enlargement.  · Moderate mitral regurgitation.  · Normal central venous pressure (3 mmHg).  · The estimated PA systolic pressure is 34 mmHg.            **Labs and x-rays personally reviewed by me    ** reviewed      Objective:        Assessment:       1. Type 2 diabetes mellitus with other specified complication, without long-term current use of insulin  Basic Metabolic Panel    Hemoglobin A1C    Basic Metabolic Panel    Hemoglobin A1C      2. TSH deficiency  TSH    TSH      3. Class 2 obesity with body mass index (BMI) of 35.0 to 35.9 in adult, unspecified obesity type, unspecified whether serious comorbidity present        4. Gastroesophageal reflux disease, unspecified whether esophagitis present        5. Non-seasonal allergic rhinitis due to pollen             Plan:         [unfilled]

## 2023-07-04 RX ORDER — CYCLOSPORINE 0.5 MG/ML
EMULSION OPHTHALMIC
Qty: 60 EACH | Refills: 3 | Status: SHIPPED | OUTPATIENT
Start: 2023-07-04 | End: 2023-10-31

## 2023-07-10 RX ORDER — INSULIN ASPART 100 [IU]/ML
INJECTION, SUSPENSION SUBCUTANEOUS
Qty: 15 ML | Refills: 2 | Status: SHIPPED | OUTPATIENT
Start: 2023-07-10 | End: 2024-03-20 | Stop reason: SDUPTHER

## 2023-07-12 RX ORDER — NITROGLYCERIN 0.4 MG/1
TABLET SUBLINGUAL
Qty: 25 TABLET | Refills: 2 | Status: SHIPPED | OUTPATIENT
Start: 2023-07-12 | End: 2024-02-21 | Stop reason: SDUPTHER

## 2023-07-20 ENCOUNTER — PATIENT MESSAGE (OUTPATIENT)
Dept: ADMINISTRATIVE | Facility: HOSPITAL | Age: 58
End: 2023-07-20

## 2023-08-02 RX ORDER — METOPROLOL SUCCINATE 50 MG/1
50 TABLET, EXTENDED RELEASE ORAL
Qty: 30 TABLET | Refills: 1 | Status: SHIPPED | OUTPATIENT
Start: 2023-08-02 | End: 2024-01-30 | Stop reason: SDUPTHER

## 2023-08-02 RX ORDER — ESZOPICLONE 3 MG/1
TABLET, FILM COATED ORAL
Qty: 30 TABLET | Refills: 1 | Status: SHIPPED | OUTPATIENT
Start: 2023-08-02 | End: 2023-09-28

## 2023-08-09 RX ORDER — PROMETHAZINE HYDROCHLORIDE 50 MG/1
TABLET ORAL
Qty: 60 TABLET | Refills: 1 | Status: SHIPPED | OUTPATIENT
Start: 2023-08-09 | End: 2023-09-26 | Stop reason: SDUPTHER

## 2023-08-29 RX ORDER — CLOPIDOGREL BISULFATE 75 MG/1
75 TABLET ORAL
Qty: 30 TABLET | Refills: 1 | Status: SHIPPED | OUTPATIENT
Start: 2023-08-29 | End: 2023-10-31

## 2023-08-29 RX ORDER — GLIMEPIRIDE 2 MG/1
2 TABLET ORAL
Qty: 30 TABLET | Refills: 1 | Status: SHIPPED | OUTPATIENT
Start: 2023-08-29 | End: 2023-09-26 | Stop reason: SDUPTHER

## 2023-08-29 RX ORDER — ATORVASTATIN CALCIUM 80 MG/1
TABLET, FILM COATED ORAL
Qty: 30 TABLET | Refills: 1 | Status: SHIPPED | OUTPATIENT
Start: 2023-08-29 | End: 2024-03-20 | Stop reason: SDUPTHER

## 2023-09-26 ENCOUNTER — OFFICE VISIT (OUTPATIENT)
Dept: FAMILY MEDICINE | Facility: CLINIC | Age: 58
End: 2023-09-26
Payer: MEDICARE

## 2023-09-26 VITALS
DIASTOLIC BLOOD PRESSURE: 64 MMHG | RESPIRATION RATE: 17 BRPM | TEMPERATURE: 98 F | HEIGHT: 67 IN | HEART RATE: 96 BPM | SYSTOLIC BLOOD PRESSURE: 128 MMHG | WEIGHT: 229 LBS | OXYGEN SATURATION: 96 % | BODY MASS INDEX: 35.94 KG/M2

## 2023-09-26 DIAGNOSIS — J06.9 UPPER RESPIRATORY TRACT INFECTION, UNSPECIFIED TYPE: Primary | ICD-10-CM

## 2023-09-26 DIAGNOSIS — E11.69 TYPE 2 DIABETES MELLITUS WITH OTHER SPECIFIED COMPLICATION, WITHOUT LONG-TERM CURRENT USE OF INSULIN: ICD-10-CM

## 2023-09-26 LAB
CTP QC/QA: YES
CTP QC/QA: YES
EST. AVERAGE GLUCOSE BLD GHB EST-MCNC: 204 MG/DL
FLUAV AG NPH QL: NEGATIVE
FLUBV AG NPH QL: NEGATIVE
HBA1C MFR BLD HPLC: 8.7 % (ref 4.5–6.6)
S PYO RRNA THROAT QL PROBE: NEGATIVE
SARS-COV-2 AG RESP QL IA.RAPID: NEGATIVE

## 2023-09-26 PROCEDURE — 87428 SARSCOV & INF VIR A&B AG IA: CPT | Mod: RHCUB | Performed by: INTERNAL MEDICINE

## 2023-09-26 PROCEDURE — 99214 PR OFFICE/OUTPT VISIT, EST, LEVL IV, 30-39 MIN: ICD-10-PCS | Mod: ,,, | Performed by: INTERNAL MEDICINE

## 2023-09-26 PROCEDURE — 83036 HEMOGLOBIN A1C: ICD-10-PCS | Mod: ,,, | Performed by: CLINICAL MEDICAL LABORATORY

## 2023-09-26 PROCEDURE — 99214 OFFICE O/P EST MOD 30 MIN: CPT | Mod: ,,, | Performed by: INTERNAL MEDICINE

## 2023-09-26 PROCEDURE — 83036 HEMOGLOBIN GLYCOSYLATED A1C: CPT | Mod: ,,, | Performed by: CLINICAL MEDICAL LABORATORY

## 2023-09-26 PROCEDURE — 87880 STREP A ASSAY W/OPTIC: CPT | Mod: RHCUB | Performed by: INTERNAL MEDICINE

## 2023-09-26 RX ORDER — AMITRIPTYLINE HYDROCHLORIDE 25 MG/1
25 TABLET, FILM COATED ORAL DAILY
Qty: 180 TABLET | Refills: 1 | Status: SHIPPED | OUTPATIENT
Start: 2023-09-26

## 2023-09-26 RX ORDER — INSULIN ASPART 100 [IU]/ML
20 INJECTION, SUSPENSION SUBCUTANEOUS 2 TIMES DAILY
Qty: 12 ML | Refills: 5 | Status: SHIPPED | OUTPATIENT
Start: 2023-09-26 | End: 2024-03-20 | Stop reason: SDUPTHER

## 2023-09-26 RX ORDER — UBROGEPANT 100 MG/1
100 TABLET ORAL DAILY PRN
Qty: 90 TABLET | Refills: 1 | Status: SHIPPED | OUTPATIENT
Start: 2023-09-26 | End: 2024-03-11

## 2023-09-26 RX ORDER — APIXABAN 2.5 MG/1
2.5 TABLET, FILM COATED ORAL 2 TIMES DAILY
Qty: 180 TABLET | Refills: 1 | Status: SHIPPED | OUTPATIENT
Start: 2023-09-26 | End: 2024-01-30 | Stop reason: SDUPTHER

## 2023-09-26 RX ORDER — PIOGLITAZONEHYDROCHLORIDE 15 MG/1
15 TABLET ORAL DAILY
Qty: 90 TABLET | Refills: 1 | Status: SHIPPED | OUTPATIENT
Start: 2023-09-26 | End: 2024-04-01

## 2023-09-26 RX ORDER — ATORVASTATIN CALCIUM 80 MG/1
80 TABLET, FILM COATED ORAL DAILY
Qty: 90 TABLET | Refills: 1 | Status: SHIPPED | OUTPATIENT
Start: 2023-09-26 | End: 2024-01-30 | Stop reason: SDUPTHER

## 2023-09-26 RX ORDER — METAXALONE 800 MG/1
400 TABLET ORAL
COMMUNITY
Start: 2023-09-07 | End: 2024-03-20

## 2023-09-26 RX ORDER — GLIMEPIRIDE 2 MG/1
2 TABLET ORAL
Qty: 90 TABLET | Refills: 1 | Status: SHIPPED | OUTPATIENT
Start: 2023-09-26 | End: 2024-03-20 | Stop reason: SDUPTHER

## 2023-09-26 RX ORDER — INSULIN GLARGINE 100 [IU]/ML
35 INJECTION, SOLUTION SUBCUTANEOUS NIGHTLY
Qty: 12 ML | Refills: 4 | Status: SHIPPED | OUTPATIENT
Start: 2023-09-26 | End: 2024-04-03

## 2023-09-26 RX ORDER — PROMETHAZINE HYDROCHLORIDE 50 MG/1
TABLET ORAL
Qty: 30 TABLET | Refills: 1 | Status: SHIPPED | OUTPATIENT
Start: 2023-09-26 | End: 2024-01-11

## 2023-09-26 RX ORDER — METOPROLOL SUCCINATE 50 MG/1
50 TABLET, EXTENDED RELEASE ORAL DAILY
Qty: 90 TABLET | Refills: 1 | Status: SHIPPED | OUTPATIENT
Start: 2023-09-26 | End: 2024-03-20 | Stop reason: SDUPTHER

## 2023-09-26 NOTE — LETTER
September 26, 2023               [facility] [address] [facility]  [address] [place] [address]  Phone: [phone]  Fax: [phone]   Patient: Anuradha Godfrey   MR Number: [id number]   YOB: 1965   Date of Visit: 9/26/2023     To Whom It May Concern:    Anuradha Godfrey has been a long time patient of mine. We are treating her for multiple issues and because of that she would benefit by having an emotional support animal. She should be allowed to have her dog with her at all times.     If you have any questions or concerns, please don't hesitate to contact my office.    Sincerely,      Munir Garcia MD

## 2023-09-28 RX ORDER — ESZOPICLONE 3 MG/1
TABLET, FILM COATED ORAL
Qty: 30 TABLET | Refills: 1 | Status: SHIPPED | OUTPATIENT
Start: 2023-09-28 | End: 2023-12-11

## 2023-10-03 PROBLEM — J06.9 UPPER RESPIRATORY TRACT INFECTION: Status: ACTIVE | Noted: 2023-10-03

## 2023-10-03 NOTE — PROGRESS NOTES
Subjective:       Patient ID: Anuradha Godfrey is a 58 y.o. female.    Chief Complaint: Back Pain  Patient presents with a cough upper respiratory tract on problems also complains of some congestion.  Patient is having exacerbation GERD  In requests a prescription for the PPIs.dexilant.  P was checke for COVID fluid and strep.  All are negative.    Health maintenance issues patient will need a hemoglobin A1c today.  Patient does have neuropathies sometimes problems sleeping will refill Elavil 25 mg 1 p.o. q.h.s. patient does take chronic long-term anticoagulation will refill Eliquis 2.5 mg 1 p.o. b.i.d..  Back Pain  Pertinent negatives include no abdominal pain, chest pain or fever.     .    Current Medications:    Current Outpatient Medications:     amoxicillin (AMOXIL) 500 MG capsule, Take 1 capsule (500 mg total) by mouth 3 (three) times daily., Disp: 21 capsule, Rfl: 0    aspirin (ECOTRIN) 81 MG EC tablet, Take 1 tablet (81 mg total) by mouth once daily., Disp: 30 tablet, Rfl: 1    atorvastatin (LIPITOR) 80 MG tablet, TAKE 1 TABLET BY MOUTH EVERY EVENING *NO GRAPEFRUIT*, Disp: 30 tablet, Rfl: 1    blood sugar diagnostic Strp, 1 strip by Misc.(Non-Drug; Combo Route) route 3 (three) times daily., Disp: 200 strip, Rfl: 6    clopidogreL (PLAVIX) 75 mg tablet, Take 1 tablet (75 mg total) by mouth once daily., Disp: 90 tablet, Rfl: 1    cycloSPORINE (RESTASIS) 0.05 % ophthalmic emulsion, PLACE 1 DROP INTO BOTH EYES TWICE DAILY, Disp: 60 each, Rfl: 3    dexlansoprazole (DEXILANT) 60 mg capsule, Take 1 capsule (60 mg total) by mouth once daily., Disp: 30 capsule, Rfl: 5    diclofenac sodium (VOLTAREN) 1 % Gel, Apply topically., Disp: , Rfl:     docusate sodium (COLACE) 50 MG capsule, Take by mouth., Disp: , Rfl:     EPINEPHrine (EPIPEN) 0.3 mg/0.3 mL AtIn, Inject 0.3 mLs (0.3 mg total) into the muscle once. for 1 dose, Disp: 0.3 mL, Rfl: 0    esomeprazole (NEXIUM) 40 MG capsule, Take 1 capsule (40 mg total) by mouth  before breakfast., Disp: 30 capsule, Rfl: 11    ezetimibe (ZETIA) 10 mg tablet, Take 1 tablet (10 mg total) by mouth once daily., Disp: 90 tablet, Rfl: 1    fluticasone propionate (FLONASE) 50 mcg/actuation nasal spray, 1 spray (50 mcg total) by Each Nostril route once daily., Disp: 16 g, Rfl: 6    fluticasone propionate (FLONASE) 50 mcg/actuation nasal spray, 1 spray (50 mcg total) by Each Nostril route once daily., Disp: 16 g, Rfl: 1    fosinopriL (MONOPRIL) 20 MG tablet, Take 1 tablet (20 mg total) by mouth once daily., Disp: 90 tablet, Rfl: 3    glimepiride (AMARYL) 2 MG tablet, Take 1 tablet (2 mg total) by mouth daily with breakfast., Disp: 90 tablet, Rfl: 1    HYDROcodone-acetaminophen (NORCO)  mg per tablet, TAKE 1 TABLET BY MOUTH 4 TIMES DAILY AS NEEDED ..MAY CAUSE DROWSINESS- AVOID ALCOHOL, Disp: , Rfl:     insulin glargine 100 units/mL SubQ pen, INJECT 35 UNITS UNDER THE SKIN EACH EVENING ...KEEP REFRIGERATED, Disp: 18 mL, Rfl: 4    LIDOcaine HCl 2% (LIDOCAINE VISCOUS) 2 % Soln, by Mucous Membrane route 3 (three) times daily., Disp: 100 mL, Rfl: 0    metaxalone (SKELAXIN) 800 MG tablet, Take 400 mg by mouth., Disp: , Rfl:     metoprolol succinate (TOPROL-XL) 50 MG 24 hr tablet, TAKE 1 TABLET BY MOUTH DAILY, Disp: 30 tablet, Rfl: 1    morphine (MS CONTIN) 15 MG 12 hr tablet, TAKE 1 TABLET BY MOUTH EACH NIGHT AT BEDTIME ..MAY CAUSE DROWSINESS- AVOID ALCOHOL, Disp: , Rfl:     naproxen (NAPROSYN) 500 MG tablet, Take 1 tablet (500 mg total) by mouth 2 (two) times daily., Disp: 20 tablet, Rfl: 0    nitroGLYCERIN (NITROSTAT) 0.4 MG SL tablet, Place 1 tablet (0.4 mg total) under the tongue as needed for Chest pain., Disp: 25 tablet, Rfl: 2    nitroGLYCERIN (NITROSTAT) 0.4 MG SL tablet, DISSOLVE 1 TABLET UNDER TONGUE FOR CHEST PAIN EVERY 5 MINUTES X 3 DOSES IF NO RELIEF GO TO EMERGENCY ROOM, Disp: 25 tablet, Rfl: 2    pioglitazone (ACTOS) 15 MG tablet, Take 1 tablet (15 mg total) by mouth once daily., Disp:  90 tablet, Rfl: 1    pregabalin (LYRICA) 200 MG Cap, TAKE 1 CAPSULE BY MOUTH 3 TIMES DAILY ..MAY CAUSE DROWSINESS- AVOID ALCOHOL, Disp: , Rfl:     promethazine (PHENERGAN) 50 MG tablet, Take 1 tablet (50 mg total) by mouth every 4 to 6 hours as needed for Nausea., Disp: 60 tablet, Rfl: 1    UBRELVY 100 mg tablet, Take 1 tablet (100 mg total) by mouth daily as needed for Migraine., Disp: 90 tablet, Rfl: 1    amitriptyline (ELAVIL) 25 MG tablet, Take 1 tablet (25 mg total) by mouth once daily., Disp: 180 tablet, Rfl: 1    amitriptyline (ELAVIL) 50 MG tablet, TAKE 1 TABLET BY MOUTH EACH EVENING, Disp: , Rfl:     atorvastatin (LIPITOR) 80 MG tablet, Take 1 tablet (80 mg total) by mouth once daily., Disp: 90 tablet, Rfl: 1    blood sugar diagnostic Strp, 1 strip by Misc.(Non-Drug; Combo Route) route 3 (three) times daily., Disp: 200 strip, Rfl: 6    clopidogreL (PLAVIX) 75 mg tablet, TAKE 1 TABLET BY MOUTH ONCE DAILY (Patient not taking: Reported on 9/26/2023), Disp: 30 tablet, Rfl: 1    ELIQUIS 2.5 mg Tab, Take 1 tablet (2.5 mg total) by mouth 2 (two) times daily. (Patient not taking: Reported on 9/26/2023), Disp: 180 tablet, Rfl: 1    ELIQUIS 2.5 mg Tab, Take 1 tablet (2.5 mg total) by mouth 2 (two) times daily., Disp: 180 tablet, Rfl: 1    esomeprazole (NEXIUM) 40 MG capsule, Take 1 capsule (40 mg total) by mouth before breakfast. (Patient not taking: Reported on 1/23/2023), Disp: 90 capsule, Rfl: 1    esomeprazole (NEXIUM) 40 MG capsule, Take 1 capsule (40 mg total) by mouth before breakfast. (Patient not taking: Reported on 1/23/2023), Disp: 90 capsule, Rfl: 1    eszopiclone (LUNESTA) 3 mg Tab, TAKE 1 TABLET BY MOUTH EVERY NIGHT AT BEDTIME AS NEEDED FOR SLEEP *AVOID ALCOHOL *CAUSES DROWSINESS*, Disp: 30 tablet, Rfl: 1    glimepiride (AMARYL) 2 MG tablet, Take 1 tablet (2 mg total) by mouth daily with breakfast., Disp: 90 tablet, Rfl: 1    icosapent ethyL (VASCEPA) 1 gram Cap, Take 2 capsules (2 g total) by mouth 2  (two) times daily. (Patient not taking: Reported on 6/8/2023), Disp: 60 capsule, Rfl: 3    LANTUS SOLOSTAR U-100 INSULIN glargine 100 units/mL SubQ pen, Inject 35 Units into the skin every evening. (Patient not taking: Reported on 9/26/2023), Disp: 12 mL, Rfl: 12    LANTUS SOLOSTAR U-100 INSULIN glargine 100 units/mL SubQ pen, Inject 35 Units into the skin every evening. (Patient not taking: Reported on 9/26/2023), Disp: 12 mL, Rfl: 4    LANTUS SOLOSTAR U-100 INSULIN glargine 100 units/mL SubQ pen, Inject 35 Units into the skin every evening., Disp: 12 mL, Rfl: 4    methocarbamoL (ROBAXIN) 500 MG Tab, TAKE 1 TABLET BY MOUTH 3 TIMES DAILY WITH FOOD AS NEEDED ..MAY CAUSE DROWSINESS- AVOID ALCOHOL, Disp: , Rfl:     methocarbamoL (ROBAXIN) 750 MG Tab, TAKE 1 TABLET BY MOUTH 3 TIMES DAILY WITH FOOD AS NEEDED ..MAY CAUSE DROWSINESS- AVOID ALCOHOL, Disp: , Rfl:     metoprolol succinate (TOPROL-XL) 50 MG 24 hr tablet, Take 1 tablet (50 mg total) by mouth once daily., Disp: 90 tablet, Rfl: 1    NOVOLOG MIX 70-30FLEXPEN U-100 100 unit/mL (70-30) InPn pen, Inject 20 Units into the skin 2 (two) times a day. (Patient not taking: Reported on 9/26/2023), Disp: 12 mL, Rfl: 12    NOVOLOG MIX 70-30FLEXPEN U-100 100 unit/mL (70-30) InPn pen, INJECT INJECT 20 UNITS UNDER THE SKIN TWICE DAILY ...KEEP REFRIGERATED (Patient not taking: Reported on 9/26/2023), Disp: 15 mL, Rfl: 2    NOVOLOG MIX 70-30FLEXPEN U-100 100 unit/mL (70-30) InPn pen, Inject 20 Units into the skin 2 (two) times a day., Disp: 12 mL, Rfl: 5    pioglitazone (ACTOS) 15 MG tablet, Take 1 tablet (15 mg total) by mouth once daily., Disp: 90 tablet, Rfl: 1    promethazine (PHENERGAN) 50 MG tablet, TAKE 1 TABLET BY MOUTH EVERY 4-6 HOURS AS NEEDED FOR NAUSEA & VOMITING ..MAY CAUSE DROWSINESS, Disp: 30 tablet, Rfl: 1    sulfamethoxazole-trimethoprim 800-160mg (BACTRIM DS) 800-160 mg Tab, Take 1 tablet by mouth 2 (two) times daily. (Patient not taking: Reported on 6/8/2023),  "Disp: 20 tablet, Rfl: 0    UBRELVY 100 mg tablet, Take 1 tablet (100 mg total) by mouth daily as needed for Migraine., Disp: 90 tablet, Rfl: 1           Review of Systems   Constitutional:  Negative for appetite change, fatigue and fever.   Respiratory:  Negative for shortness of breath.    Cardiovascular:  Negative for chest pain.   Gastrointestinal:  Negative for abdominal pain and constipation.   Endocrine: Negative for polydipsia, polyphagia and polyuria.   Genitourinary:  Negative for difficulty urinating, frequency and hot flashes.   Musculoskeletal:  Positive for back pain.   Allergic/Immunologic: Negative for environmental allergies.   Neurological:  Negative for dizziness and light-headedness.   Psychiatric/Behavioral:  Negative for agitation.                 Vitals:    09/26/23 1024   BP: 128/64   BP Location: Right arm   Patient Position: Sitting   BP Method: Large (Automatic)   Pulse: 96   Resp: 17   Temp: 97.6 °F (36.4 °C)   TempSrc: Temporal   SpO2: 96%   Weight: 103.9 kg (229 lb)  Comment: Wheelchair   Height: 5' 7" (1.702 m)        Physical Exam  Vitals and nursing note reviewed.   Constitutional:       Appearance: Normal appearance.   Cardiovascular:      Rate and Rhythm: Normal rate and regular rhythm.      Pulses: Normal pulses.      Heart sounds: Normal heart sounds.   Pulmonary:      Effort: Pulmonary effort is normal.      Breath sounds: Normal breath sounds.   Abdominal:      General: Abdomen is flat. Bowel sounds are normal.      Palpations: Abdomen is soft.   Musculoskeletal:         General: Normal range of motion.   Skin:     General: Skin is warm and dry.   Neurological:      General: No focal deficit present.      Mental Status: She is alert and oriented to person, place, and time. Mental status is at baseline.           Last Labs:     Office Visit on 09/26/2023   Component Date Value    Rapid Strep A Screen 09/26/2023 Negative      Acceptab* 09/26/2023 Yes     SARS " Coronavirus 2 Antig* 09/26/2023 Negative     Rapid Influenza A Ag 09/26/2023 Negative     Rapid Influenza B Ag 09/26/2023 Negative      Acceptab* 09/26/2023 Yes     Hemoglobin A1C 09/26/2023 8.7 (H)     Estimated Average Glucose 09/26/2023 204        Last Imaging:  Echo  · The left ventricle is normal in size with mild concentric hypertrophy   and moderately decreased systolic function.  · The estimated ejection fraction is 35%.  · Left ventricular diastolic dysfunction.  · Normal right ventricular size with normal right ventricular systolic   function.  · Mild right atrial enlargement.  · Moderate mitral regurgitation.  · Normal central venous pressure (3 mmHg).  · The estimated PA systolic pressure is 34 mmHg.            **Labs and x-rays personally reviewed by me    ** reviewed      Objective:        Assessment:       1. Upper respiratory tract infection, unspecified type  POCT rapid strep A    POCT SARS-COV2 (COVID) with Flu Antigen      2. Type 2 diabetes mellitus with other specified complication, without long-term current use of insulin  Hemoglobin A1C    Hemoglobin A1C           Plan:         [unfilled]

## 2023-10-31 RX ORDER — CYCLOSPORINE 0.5 MG/ML
EMULSION OPHTHALMIC
Qty: 60 EACH | Refills: 3 | Status: SHIPPED | OUTPATIENT
Start: 2023-10-31

## 2023-10-31 RX ORDER — CLOPIDOGREL BISULFATE 75 MG/1
75 TABLET ORAL
Qty: 30 TABLET | Refills: 1 | Status: SHIPPED | OUTPATIENT
Start: 2023-10-31 | End: 2024-03-20 | Stop reason: SDUPTHER

## 2023-12-09 DIAGNOSIS — Z71.89 COMPLEX CARE COORDINATION: ICD-10-CM

## 2023-12-11 RX ORDER — ESZOPICLONE 3 MG/1
TABLET, FILM COATED ORAL
Qty: 30 TABLET | Refills: 1 | Status: SHIPPED | OUTPATIENT
Start: 2023-12-11 | End: 2024-02-01

## 2023-12-11 RX ORDER — ESOMEPRAZOLE MAGNESIUM 40 MG/1
CAPSULE, DELAYED RELEASE ORAL
Qty: 30 CAPSULE | Refills: 10 | Status: SHIPPED | OUTPATIENT
Start: 2023-12-11 | End: 2024-01-30 | Stop reason: SDUPTHER

## 2024-01-11 RX ORDER — PROMETHAZINE HYDROCHLORIDE 50 MG/1
TABLET ORAL
Qty: 30 TABLET | Refills: 1 | Status: SHIPPED | OUTPATIENT
Start: 2024-01-11 | End: 2024-02-14

## 2024-01-30 ENCOUNTER — OFFICE VISIT (OUTPATIENT)
Dept: FAMILY MEDICINE | Facility: CLINIC | Age: 59
End: 2024-01-30
Payer: MEDICARE

## 2024-01-30 ENCOUNTER — APPOINTMENT (OUTPATIENT)
Dept: RADIOLOGY | Facility: CLINIC | Age: 59
End: 2024-01-30
Attending: INTERNAL MEDICINE
Payer: MEDICARE

## 2024-01-30 VITALS
WEIGHT: 237 LBS | SYSTOLIC BLOOD PRESSURE: 139 MMHG | BODY MASS INDEX: 37.2 KG/M2 | RESPIRATION RATE: 17 BRPM | HEIGHT: 67 IN | HEART RATE: 91 BPM | TEMPERATURE: 98 F | DIASTOLIC BLOOD PRESSURE: 60 MMHG | OXYGEN SATURATION: 98 %

## 2024-01-30 DIAGNOSIS — E11.69 TYPE 2 DIABETES MELLITUS WITH OTHER SPECIFIED COMPLICATION, WITHOUT LONG-TERM CURRENT USE OF INSULIN: ICD-10-CM

## 2024-01-30 DIAGNOSIS — R05.9 COUGH, UNSPECIFIED TYPE: ICD-10-CM

## 2024-01-30 DIAGNOSIS — R05.9 COUGH, UNSPECIFIED TYPE: Primary | ICD-10-CM

## 2024-01-30 LAB
CHOLEST SERPL-MCNC: 146 MG/DL (ref 0–200)
CHOLEST/HDLC SERPL: 4.7 {RATIO}
EST. AVERAGE GLUCOSE BLD GHB EST-MCNC: 123 MG/DL
HBA1C MFR BLD HPLC: 5.9 % (ref 4.5–6.6)
HDLC SERPL-MCNC: 31 MG/DL (ref 40–60)
LDLC SERPL CALC-MCNC: 80 MG/DL
LDLC/HDLC SERPL: 2.6 {RATIO}
NONHDLC SERPL-MCNC: 115 MG/DL
TRIGL SERPL-MCNC: 174 MG/DL (ref 35–150)
VLDLC SERPL-MCNC: 35 MG/DL

## 2024-01-30 PROCEDURE — 71045 X-RAY EXAM CHEST 1 VIEW: CPT | Mod: TC,RHCUB | Performed by: INTERNAL MEDICINE

## 2024-01-30 PROCEDURE — 99214 OFFICE O/P EST MOD 30 MIN: CPT | Mod: ,,, | Performed by: INTERNAL MEDICINE

## 2024-01-30 PROCEDURE — 83036 HEMOGLOBIN GLYCOSYLATED A1C: CPT | Mod: ,,, | Performed by: CLINICAL MEDICAL LABORATORY

## 2024-01-30 PROCEDURE — 80061 LIPID PANEL: CPT | Mod: ,,, | Performed by: CLINICAL MEDICAL LABORATORY

## 2024-01-30 RX ORDER — ESOMEPRAZOLE MAGNESIUM 40 MG/1
CAPSULE, DELAYED RELEASE ORAL
Qty: 30 CAPSULE | Refills: 10 | Status: SHIPPED | OUTPATIENT
Start: 2024-01-30

## 2024-01-30 RX ORDER — METOPROLOL SUCCINATE 50 MG/1
50 TABLET, EXTENDED RELEASE ORAL DAILY
Qty: 30 TABLET | Refills: 1 | Status: SHIPPED | OUTPATIENT
Start: 2024-01-30 | End: 2024-04-01

## 2024-01-30 RX ORDER — PROMETHAZINE HYDROCHLORIDE 50 MG/1
50 TABLET ORAL
Qty: 60 TABLET | Refills: 1 | Status: SHIPPED | OUTPATIENT
Start: 2024-01-30 | End: 2024-04-30 | Stop reason: SDUPTHER

## 2024-01-30 RX ORDER — PREGABALIN 200 MG/1
CAPSULE ORAL
Qty: 90 CAPSULE | Refills: 0 | Status: SHIPPED | OUTPATIENT
Start: 2024-01-30

## 2024-01-30 RX ORDER — APIXABAN 2.5 MG/1
2.5 TABLET, FILM COATED ORAL 2 TIMES DAILY
Qty: 180 TABLET | Refills: 1 | Status: ON HOLD | OUTPATIENT
Start: 2024-01-30 | End: 2024-03-22 | Stop reason: HOSPADM

## 2024-01-30 RX ORDER — CLOPIDOGREL BISULFATE 75 MG/1
75 TABLET ORAL DAILY
Qty: 90 TABLET | Refills: 1 | Status: SHIPPED | OUTPATIENT
Start: 2024-01-30 | End: 2024-04-30 | Stop reason: SDUPTHER

## 2024-01-30 RX ORDER — UBROGEPANT 100 MG/1
100 TABLET ORAL DAILY PRN
Qty: 90 TABLET | Refills: 1 | Status: SHIPPED | OUTPATIENT
Start: 2024-01-30 | End: 2024-03-11

## 2024-01-30 RX ORDER — ATORVASTATIN CALCIUM 80 MG/1
80 TABLET, FILM COATED ORAL DAILY
Qty: 90 TABLET | Refills: 1 | Status: SHIPPED | OUTPATIENT
Start: 2024-01-30 | End: 2024-04-30 | Stop reason: SDUPTHER

## 2024-01-30 RX ORDER — BENZONATATE 200 MG/1
200 CAPSULE ORAL 2 TIMES DAILY
Qty: 30 CAPSULE | Refills: 2 | Status: SHIPPED | OUTPATIENT
Start: 2024-01-30 | End: 2024-03-20 | Stop reason: ALTCHOICE

## 2024-01-30 RX ORDER — EZETIMIBE 10 MG/1
10 TABLET ORAL DAILY
Qty: 90 TABLET | Refills: 1 | Status: ON HOLD | OUTPATIENT
Start: 2024-01-30 | End: 2024-03-22 | Stop reason: HOSPADM

## 2024-01-30 RX ORDER — GLIMEPIRIDE 2 MG/1
2 TABLET ORAL
Qty: 90 TABLET | Refills: 1 | Status: SHIPPED | OUTPATIENT
Start: 2024-01-30 | End: 2024-04-30 | Stop reason: SDUPTHER

## 2024-01-30 RX ORDER — AMITRIPTYLINE HYDROCHLORIDE 50 MG/1
50 TABLET, FILM COATED ORAL NIGHTLY
Qty: 90 TABLET | Refills: 0 | Status: SHIPPED | OUTPATIENT
Start: 2024-01-30

## 2024-01-30 NOTE — PROGRESS NOTES
Subjective:       Patient ID: Anuradha Godfrey is a 58 y.o. female.    Chief Complaint: Medication Refill    Medication Refill  Associated symptoms include coughing.   Patient seen and evaluated patient has chronic hypertension chronic dyslipidemia and also complains of a chronic cough.  Plan PA and lateral chest x-ray   Tessalon Perles 200 mg 1 p.o. b.i.d. p.r.n. cough   Going to refills several meds for sleep amitriptyline 25 mg 1 p.o. q.h.s. of chronic dyslipidemia Lipitor 80 mg 1 p.o. q.day  Health maintenance issues will check A1c lipids and set the patient up for a mammogram  .    Current Medications:    Current Outpatient Medications:     amitriptyline (ELAVIL) 25 MG tablet, Take 1 tablet (25 mg total) by mouth once daily., Disp: 180 tablet, Rfl: 1    amoxicillin (AMOXIL) 500 MG capsule, Take 1 capsule (500 mg total) by mouth 3 (three) times daily., Disp: 21 capsule, Rfl: 0    aspirin (ECOTRIN) 81 MG EC tablet, Take 1 tablet (81 mg total) by mouth once daily., Disp: 30 tablet, Rfl: 1    atorvastatin (LIPITOR) 80 MG tablet, TAKE 1 TABLET BY MOUTH EVERY EVENING *NO GRAPEFRUIT*, Disp: 30 tablet, Rfl: 1    blood sugar diagnostic Strp, 1 strip by Misc.(Non-Drug; Combo Route) route 3 (three) times daily., Disp: 200 strip, Rfl: 6    clopidogreL (PLAVIX) 75 mg tablet, TAKE 1 TABLET BY MOUTH ONCE DAILY, Disp: 30 tablet, Rfl: 1    cycloSPORINE (RESTASIS) 0.05 % ophthalmic emulsion, PLACE 1 DROP INTO BOTH EYES TWICE DAILY, Disp: 60 each, Rfl: 3    dexlansoprazole (DEXILANT) 60 mg capsule, Take 1 capsule (60 mg total) by mouth once daily., Disp: 30 capsule, Rfl: 5    diclofenac sodium (VOLTAREN) 1 % Gel, Apply topically., Disp: , Rfl:     docusate sodium (COLACE) 50 MG capsule, Take by mouth., Disp: , Rfl:     ELIQUIS 2.5 mg Tab, Take 1 tablet (2.5 mg total) by mouth 2 (two) times daily., Disp: 180 tablet, Rfl: 1    eszopiclone (LUNESTA) 3 mg Tab, TAKE 1 TABLET BY MOUTH EVERY NIGHT AT BEDTIME AS NEEDED FOR SLEEP *AVOID  ALCOHOL *CAUSES DROWSINESS*, Disp: 30 tablet, Rfl: 1    fluticasone propionate (FLONASE) 50 mcg/actuation nasal spray, 1 spray (50 mcg total) by Each Nostril route once daily., Disp: 16 g, Rfl: 6    fluticasone propionate (FLONASE) 50 mcg/actuation nasal spray, 1 spray (50 mcg total) by Each Nostril route once daily., Disp: 16 g, Rfl: 1    fosinopriL (MONOPRIL) 20 MG tablet, Take 1 tablet (20 mg total) by mouth once daily., Disp: 90 tablet, Rfl: 3    glimepiride (AMARYL) 2 MG tablet, Take 1 tablet (2 mg total) by mouth daily with breakfast., Disp: 90 tablet, Rfl: 1    HYDROcodone-acetaminophen (NORCO)  mg per tablet, TAKE 1 TABLET BY MOUTH 4 TIMES DAILY AS NEEDED ..MAY CAUSE DROWSINESS- AVOID ALCOHOL, Disp: , Rfl:     icosapent ethyL (VASCEPA) 1 gram Cap, Take 2 capsules (2 g total) by mouth 2 (two) times daily., Disp: 60 capsule, Rfl: 3    insulin glargine 100 units/mL SubQ pen, INJECT 35 UNITS UNDER THE SKIN EACH EVENING ...KEEP REFRIGERATED, Disp: 18 mL, Rfl: 4    LANTUS SOLOSTAR U-100 INSULIN glargine 100 units/mL SubQ pen, Inject 35 Units into the skin every evening., Disp: 12 mL, Rfl: 12    LANTUS SOLOSTAR U-100 INSULIN glargine 100 units/mL SubQ pen, Inject 35 Units into the skin every evening., Disp: 12 mL, Rfl: 4    LANTUS SOLOSTAR U-100 INSULIN glargine 100 units/mL SubQ pen, Inject 35 Units into the skin every evening., Disp: 12 mL, Rfl: 4    LIDOcaine HCl 2% (LIDOCAINE VISCOUS) 2 % Soln, by Mucous Membrane route 3 (three) times daily., Disp: 100 mL, Rfl: 0    metaxalone (SKELAXIN) 800 MG tablet, Take 400 mg by mouth., Disp: , Rfl:     methocarbamoL (ROBAXIN) 500 MG Tab, TAKE 1 TABLET BY MOUTH 3 TIMES DAILY WITH FOOD AS NEEDED ..MAY CAUSE DROWSINESS- AVOID ALCOHOL, Disp: , Rfl:     methocarbamoL (ROBAXIN) 750 MG Tab, TAKE 1 TABLET BY MOUTH 3 TIMES DAILY WITH FOOD AS NEEDED ..MAY CAUSE DROWSINESS- AVOID ALCOHOL, Disp: , Rfl:     metoprolol succinate (TOPROL-XL) 50 MG 24 hr tablet, Take 1 tablet (50  mg total) by mouth once daily., Disp: 90 tablet, Rfl: 1    morphine (MS CONTIN) 15 MG 12 hr tablet, TAKE 1 TABLET BY MOUTH EACH NIGHT AT BEDTIME ..MAY CAUSE DROWSINESS- AVOID ALCOHOL, Disp: , Rfl:     naproxen (NAPROSYN) 500 MG tablet, Take 1 tablet (500 mg total) by mouth 2 (two) times daily., Disp: 20 tablet, Rfl: 0    nitroGLYCERIN (NITROSTAT) 0.4 MG SL tablet, Place 1 tablet (0.4 mg total) under the tongue as needed for Chest pain., Disp: 25 tablet, Rfl: 2    nitroGLYCERIN (NITROSTAT) 0.4 MG SL tablet, DISSOLVE 1 TABLET UNDER TONGUE FOR CHEST PAIN EVERY 5 MINUTES X 3 DOSES IF NO RELIEF GO TO EMERGENCY ROOM, Disp: 25 tablet, Rfl: 2    NOVOLOG MIX 70-30FLEXPEN U-100 100 unit/mL (70-30) InPn pen, Inject 20 Units into the skin 2 (two) times a day., Disp: 12 mL, Rfl: 12    NOVOLOG MIX 70-30FLEXPEN U-100 100 unit/mL (70-30) InPn pen, INJECT INJECT 20 UNITS UNDER THE SKIN TWICE DAILY ...KEEP REFRIGERATED, Disp: 15 mL, Rfl: 2    NOVOLOG MIX 70-30FLEXPEN U-100 100 unit/mL (70-30) InPn pen, Inject 20 Units into the skin 2 (two) times a day., Disp: 12 mL, Rfl: 5    pioglitazone (ACTOS) 15 MG tablet, Take 1 tablet (15 mg total) by mouth once daily., Disp: 90 tablet, Rfl: 1    pioglitazone (ACTOS) 15 MG tablet, Take 1 tablet (15 mg total) by mouth once daily., Disp: 90 tablet, Rfl: 1    promethazine (PHENERGAN) 50 MG tablet, TAKE 1 TABLET BY MOUTH EVERY 4-6 HOURS AS NEEDED FOR NAUSEA & VOMITING ..MAY CAUSE DROWSINESS, Disp: 30 tablet, Rfl: 1    sulfamethoxazole-trimethoprim 800-160mg (BACTRIM DS) 800-160 mg Tab, Take 1 tablet by mouth 2 (two) times daily., Disp: 20 tablet, Rfl: 0    UBRELVY 100 mg tablet, Take 1 tablet (100 mg total) by mouth daily as needed for Migraine., Disp: 90 tablet, Rfl: 1    amitriptyline (ELAVIL) 50 MG tablet, Take 1 tablet (50 mg total) by mouth every evening., Disp: 90 tablet, Rfl: 0    atorvastatin (LIPITOR) 80 MG tablet, Take 1 tablet (80 mg total) by mouth once daily., Disp: 90 tablet, Rfl: 1    " benzonatate (TESSALON) 200 MG capsule, Take 1 capsule (200 mg total) by mouth 2 (two) times a day., Disp: 30 capsule, Rfl: 2    blood sugar diagnostic Strp, 1 strip by Misc.(Non-Drug; Combo Route) route 3 (three) times daily., Disp: 200 strip, Rfl: 6    clopidogreL (PLAVIX) 75 mg tablet, Take 1 tablet (75 mg total) by mouth once daily., Disp: 90 tablet, Rfl: 1    ELIQUIS 2.5 mg Tab, Take 1 tablet (2.5 mg total) by mouth 2 (two) times daily., Disp: 180 tablet, Rfl: 1    EPINEPHrine (EPIPEN) 0.3 mg/0.3 mL AtIn, Inject 0.3 mLs (0.3 mg total) into the muscle once. for 1 dose, Disp: 0.3 mL, Rfl: 0    esomeprazole (NEXIUM) 40 MG capsule, TAKE 1 CAPSULE BY MOUTH EACH MORNING BEFORE BREAKFAST, Disp: 30 capsule, Rfl: 10    ezetimibe (ZETIA) 10 mg tablet, Take 1 tablet (10 mg total) by mouth once daily., Disp: 90 tablet, Rfl: 1    glimepiride (AMARYL) 2 MG tablet, Take 1 tablet (2 mg total) by mouth daily with breakfast., Disp: 90 tablet, Rfl: 1    metoprolol succinate (TOPROL-XL) 50 MG 24 hr tablet, Take 1 tablet (50 mg total) by mouth once daily., Disp: 30 tablet, Rfl: 1    pregabalin (LYRICA) 200 MG Cap, TAKE 1 CAPSULE BY MOUTH 3 TIMES DAILY ..MAY CAUSE DROWSINESS- AVOID ALCOHOL, Disp: 90 capsule, Rfl: 0    promethazine (PHENERGAN) 50 MG tablet, Take 1 tablet (50 mg total) by mouth every 4 to 6 hours as needed for Nausea., Disp: 60 tablet, Rfl: 1    UBRELVY 100 mg tablet, Take 1 tablet (100 mg total) by mouth daily as needed for Migraine., Disp: 90 tablet, Rfl: 1           Review of Systems   Respiratory:  Positive for cough.    All other systems reviewed and are negative.               Vitals:    01/30/24 0920 01/30/24 0956   BP: (!) 164/69 139/60   BP Location: Right arm Right arm   Patient Position: Sitting Sitting   BP Method: Large (Automatic) Large (Automatic)   Pulse: 91    Resp: 17    Temp: 97.6 °F (36.4 °C)    TempSrc: Temporal    SpO2: 98%    Weight: 107.5 kg (237 lb)    Height: 5' 7" (1.702 m)         Physical " Exam  Vitals and nursing note reviewed.   Constitutional:       Appearance: Normal appearance.   Cardiovascular:      Rate and Rhythm: Normal rate and regular rhythm.      Pulses: Normal pulses.      Heart sounds: Normal heart sounds.   Pulmonary:      Effort: Pulmonary effort is normal.      Breath sounds: Wheezing present.   Abdominal:      General: Abdomen is flat. Bowel sounds are normal.      Palpations: Abdomen is soft.   Musculoskeletal:         General: Normal range of motion.   Skin:     General: Skin is warm and dry.   Neurological:      General: No focal deficit present.      Mental Status: She is alert and oriented to person, place, and time. Mental status is at baseline.           Last Labs:     Office Visit on 01/30/2024   Component Date Value    Hemoglobin A1C 01/30/2024 5.9     Estimated Average Glucose 01/30/2024 123     Triglycerides 01/30/2024 174 (H)     Cholesterol 01/30/2024 146     HDL Cholesterol 01/30/2024 31 (L)     Cholesterol/HDL Ratio (R* 01/30/2024 4.7     Non-HDL 01/30/2024 115     LDL Calculated 01/30/2024 80     LDL/HDL 01/30/2024 2.6     VLDL 01/30/2024 35        Last Imaging:  X-Ray Chest 1 View  Narrative: EXAMINATION:  XR CHEST 1 VIEW    CLINICAL HISTORY:  Cough, unspecified    COMPARISON:  The 5 July 2016    TECHNIQUE:  XR CHEST 1 VIEW    FINDINGS:  The heart and mediastinum are normal in size and configuration.  The pulmonary vascularity is normal in caliber.  No lung infiltrates, effusions, pneumothorax or other abnormality is demonstrated.  Impression: No evidence of cardiopulmonary disease.    Electronically signed by: Panda Cam  Date:    01/30/2024  Time:    10:02         **Labs and x-rays personally reviewed by me    ** reviewed      Objective:        Assessment:       1. Cough, unspecified type  blood sugar diagnostic Strp    amitriptyline (ELAVIL) 50 MG tablet    atorvastatin (LIPITOR) 80 MG tablet    clopidogreL (PLAVIX) 75 mg tablet    ELIQUIS 2.5 mg Tab     esomeprazole (NEXIUM) 40 MG capsule    ezetimibe (ZETIA) 10 mg tablet    glimepiride (AMARYL) 2 MG tablet    metoprolol succinate (TOPROL-XL) 50 MG 24 hr tablet    pregabalin (LYRICA) 200 MG Cap    promethazine (PHENERGAN) 50 MG tablet    UBRELVY 100 mg tablet    X-Ray Chest 1 View    benzonatate (TESSALON) 200 MG capsule      2. Type 2 diabetes mellitus with other specified complication, without long-term current use of insulin  blood sugar diagnostic Strp    amitriptyline (ELAVIL) 50 MG tablet    atorvastatin (LIPITOR) 80 MG tablet    clopidogreL (PLAVIX) 75 mg tablet    ELIQUIS 2.5 mg Tab    esomeprazole (NEXIUM) 40 MG capsule    ezetimibe (ZETIA) 10 mg tablet    glimepiride (AMARYL) 2 MG tablet    metoprolol succinate (TOPROL-XL) 50 MG 24 hr tablet    pregabalin (LYRICA) 200 MG Cap    promethazine (PHENERGAN) 50 MG tablet    UBRELVY 100 mg tablet    Hemoglobin A1C    Lipid Panel    Hemoglobin A1C    Lipid Panel    benzonatate (TESSALON) 200 MG capsule           Plan:         1. Cough, unspecified type  -     blood sugar diagnostic Strp; 1 strip by Misc.(Non-Drug; Combo Route) route 3 (three) times daily.  Dispense: 200 strip; Refill: 6  -     amitriptyline (ELAVIL) 50 MG tablet; Take 1 tablet (50 mg total) by mouth every evening.  Dispense: 90 tablet; Refill: 0  -     atorvastatin (LIPITOR) 80 MG tablet; Take 1 tablet (80 mg total) by mouth once daily.  Dispense: 90 tablet; Refill: 1  -     clopidogreL (PLAVIX) 75 mg tablet; Take 1 tablet (75 mg total) by mouth once daily.  Dispense: 90 tablet; Refill: 1  -     ELIQUIS 2.5 mg Tab; Take 1 tablet (2.5 mg total) by mouth 2 (two) times daily.  Dispense: 180 tablet; Refill: 1  -     esomeprazole (NEXIUM) 40 MG capsule; TAKE 1 CAPSULE BY MOUTH EACH MORNING BEFORE BREAKFAST  Dispense: 30 capsule; Refill: 10  -     ezetimibe (ZETIA) 10 mg tablet; Take 1 tablet (10 mg total) by mouth once daily.  Dispense: 90 tablet; Refill: 1  -     glimepiride (AMARYL) 2 MG tablet;  Take 1 tablet (2 mg total) by mouth daily with breakfast.  Dispense: 90 tablet; Refill: 1  -     metoprolol succinate (TOPROL-XL) 50 MG 24 hr tablet; Take 1 tablet (50 mg total) by mouth once daily.  Dispense: 30 tablet; Refill: 1  -     pregabalin (LYRICA) 200 MG Cap; TAKE 1 CAPSULE BY MOUTH 3 TIMES DAILY ..MAY CAUSE DROWSINESS- AVOID ALCOHOL  Dispense: 90 capsule; Refill: 0  -     promethazine (PHENERGAN) 50 MG tablet; Take 1 tablet (50 mg total) by mouth every 4 to 6 hours as needed for Nausea.  Dispense: 60 tablet; Refill: 1  -     UBRELVY 100 mg tablet; Take 1 tablet (100 mg total) by mouth daily as needed for Migraine.  Dispense: 90 tablet; Refill: 1  -     X-Ray Chest 1 View; Future; Expected date: 01/30/2024  -     benzonatate (TESSALON) 200 MG capsule; Take 1 capsule (200 mg total) by mouth 2 (two) times a day.  Dispense: 30 capsule; Refill: 2    2. Type 2 diabetes mellitus with other specified complication, without long-term current use of insulin  -     blood sugar diagnostic Strp; 1 strip by Misc.(Non-Drug; Combo Route) route 3 (three) times daily.  Dispense: 200 strip; Refill: 6  -     amitriptyline (ELAVIL) 50 MG tablet; Take 1 tablet (50 mg total) by mouth every evening.  Dispense: 90 tablet; Refill: 0  -     atorvastatin (LIPITOR) 80 MG tablet; Take 1 tablet (80 mg total) by mouth once daily.  Dispense: 90 tablet; Refill: 1  -     clopidogreL (PLAVIX) 75 mg tablet; Take 1 tablet (75 mg total) by mouth once daily.  Dispense: 90 tablet; Refill: 1  -     ELIQUIS 2.5 mg Tab; Take 1 tablet (2.5 mg total) by mouth 2 (two) times daily.  Dispense: 180 tablet; Refill: 1  -     esomeprazole (NEXIUM) 40 MG capsule; TAKE 1 CAPSULE BY MOUTH EACH MORNING BEFORE BREAKFAST  Dispense: 30 capsule; Refill: 10  -     ezetimibe (ZETIA) 10 mg tablet; Take 1 tablet (10 mg total) by mouth once daily.  Dispense: 90 tablet; Refill: 1  -     glimepiride (AMARYL) 2 MG tablet; Take 1 tablet (2 mg total) by mouth daily with  breakfast.  Dispense: 90 tablet; Refill: 1  -     metoprolol succinate (TOPROL-XL) 50 MG 24 hr tablet; Take 1 tablet (50 mg total) by mouth once daily.  Dispense: 30 tablet; Refill: 1  -     pregabalin (LYRICA) 200 MG Cap; TAKE 1 CAPSULE BY MOUTH 3 TIMES DAILY ..MAY CAUSE DROWSINESS- AVOID ALCOHOL  Dispense: 90 capsule; Refill: 0  -     promethazine (PHENERGAN) 50 MG tablet; Take 1 tablet (50 mg total) by mouth every 4 to 6 hours as needed for Nausea.  Dispense: 60 tablet; Refill: 1  -     UBRELVY 100 mg tablet; Take 1 tablet (100 mg total) by mouth daily as needed for Migraine.  Dispense: 90 tablet; Refill: 1  -     Hemoglobin A1C; Future; Expected date: 01/30/2024  -     Lipid Panel; Future; Expected date: 01/30/2024  -     benzonatate (TESSALON) 200 MG capsule; Take 1 capsule (200 mg total) by mouth 2 (two) times a day.  Dispense: 30 capsule; Refill: 2

## 2024-02-01 RX ORDER — ESZOPICLONE 3 MG/1
TABLET, FILM COATED ORAL
Qty: 30 TABLET | Refills: 1 | Status: SHIPPED | OUTPATIENT
Start: 2024-02-01 | End: 2024-04-17

## 2024-02-14 RX ORDER — PROMETHAZINE HYDROCHLORIDE 50 MG/1
TABLET ORAL
Qty: 30 TABLET | Refills: 1 | Status: SHIPPED | OUTPATIENT
Start: 2024-02-14 | End: 2024-03-20 | Stop reason: SDUPTHER

## 2024-02-22 RX ORDER — FOSINOPRIL SODIUM 20 MG/1
20 TABLET ORAL DAILY
Qty: 90 TABLET | Refills: 3 | Status: SHIPPED | OUTPATIENT
Start: 2024-02-22 | End: 2024-04-30 | Stop reason: SDUPTHER

## 2024-02-22 RX ORDER — NITROGLYCERIN 0.4 MG/1
TABLET SUBLINGUAL
Qty: 25 TABLET | Refills: 2 | Status: SHIPPED | OUTPATIENT
Start: 2024-02-22 | End: 2024-03-04 | Stop reason: SDUPTHER

## 2024-02-29 RX ORDER — FLUTICASONE PROPIONATE 50 MCG
SPRAY, SUSPENSION (ML) NASAL
Qty: 16 G | Refills: 6 | Status: ON HOLD | OUTPATIENT
Start: 2024-02-29 | End: 2024-03-22 | Stop reason: HOSPADM

## 2024-03-04 ENCOUNTER — OFFICE VISIT (OUTPATIENT)
Dept: CARDIOLOGY | Facility: CLINIC | Age: 59
End: 2024-03-04
Payer: MEDICARE

## 2024-03-04 VITALS
HEART RATE: 72 BPM | WEIGHT: 237 LBS | DIASTOLIC BLOOD PRESSURE: 70 MMHG | HEIGHT: 67 IN | RESPIRATION RATE: 18 BRPM | BODY MASS INDEX: 37.2 KG/M2 | SYSTOLIC BLOOD PRESSURE: 138 MMHG

## 2024-03-04 DIAGNOSIS — I10 ESSENTIAL (PRIMARY) HYPERTENSION: Primary | ICD-10-CM

## 2024-03-04 DIAGNOSIS — I73.9 PERIPHERAL ARTERY DISEASE: ICD-10-CM

## 2024-03-04 DIAGNOSIS — I10 ESSENTIAL (PRIMARY) HYPERTENSION: ICD-10-CM

## 2024-03-04 DIAGNOSIS — I25.10 ATHEROSCLEROSIS OF NATIVE CORONARY ARTERY OF NATIVE HEART WITHOUT ANGINA PECTORIS: ICD-10-CM

## 2024-03-04 DIAGNOSIS — D64.9 ANEMIA, UNSPECIFIED TYPE: Primary | ICD-10-CM

## 2024-03-04 PROCEDURE — 93010 ELECTROCARDIOGRAM REPORT: CPT | Mod: S$PBB,,, | Performed by: STUDENT IN AN ORGANIZED HEALTH CARE EDUCATION/TRAINING PROGRAM

## 2024-03-04 PROCEDURE — 93005 ELECTROCARDIOGRAM TRACING: CPT | Mod: PBBFAC | Performed by: STUDENT IN AN ORGANIZED HEALTH CARE EDUCATION/TRAINING PROGRAM

## 2024-03-04 PROCEDURE — 99215 OFFICE O/P EST HI 40 MIN: CPT | Mod: PBBFAC,25 | Performed by: STUDENT IN AN ORGANIZED HEALTH CARE EDUCATION/TRAINING PROGRAM

## 2024-03-04 PROCEDURE — 99214 OFFICE O/P EST MOD 30 MIN: CPT | Mod: S$PBB,,, | Performed by: STUDENT IN AN ORGANIZED HEALTH CARE EDUCATION/TRAINING PROGRAM

## 2024-03-04 RX ORDER — ISOSORBIDE MONONITRATE 30 MG/1
30 TABLET, EXTENDED RELEASE ORAL DAILY
Qty: 90 TABLET | Refills: 3 | Status: SHIPPED | OUTPATIENT
Start: 2024-03-04 | End: 2024-04-30 | Stop reason: SDUPTHER

## 2024-03-04 RX ORDER — NITROGLYCERIN 0.4 MG/1
TABLET SUBLINGUAL
Qty: 25 TABLET | Refills: 2 | Status: SHIPPED | OUTPATIENT
Start: 2024-03-04 | End: 2024-05-28

## 2024-03-04 NOTE — ASSESSMENT & PLAN NOTE
Multivessel PCI in February 2021  Worsening angina  Start IMDUR 30mg qd  Lexiscan   Aspirin 81mg (aspirin desensitization), Plavix and (Eliquis 2.5mg for her complex vascular issues )

## 2024-03-04 NOTE — PROGRESS NOTES
PCP: Munir Garcia MD    Referring Provider:     Subjective:   Anuradha Godfrey is a 58 y.o. female,nonsmoker, with hx of HTN, CAD status post multivessel PCI to LAD, circumflex, RCA in February of 2021, DM uncontrolled, 4/13/21  A1C  11.5,  PAD, left above knee amputation, who presents for follow up     3/4/24 - Patient report increasing frequency of hypertensive episodes with right arm pain taking 3-4 nitro/week with improvement. Her A1c is better controlled now.     11/2022 - Pt reports no further chest pain. She reports elevated blood pressure when in pain.     Denies SOB, orthopnea, syncope, palpitations or dizziness.      Family history of vascular disease and cancer.     Denies history of smoking or alcohol abuse.     [facility] 2/2/21: Severe 3 vessel disease.   PCI to Prox LAD with a 3.0X23mm Xience Ya,  IVUS of the LADU                      Unsuccessful balloon angioplasty of the prox LCx, unable to deliver a stent proximally due to calcification. 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          2/9/21:  [facility]  PCI to RCA and PCI to LCx.   ECHO, Jae's 2020,  EF 50-55%.  Grade 1 diastolic dysfunction.         Past Surgical History:   Procedure Laterality Date    APPENDECTOMY Right     CARDIAC CATHETERIZATION  02/04/2021    PCI to prox LAD; IVUS of LAD    CHOLECYSTECTOMY      OOPHORECTOMY      TOTAL ABDOMINAL HYSTERECTOMY        Family History   Problem Relation Age of Onset    Heart disease Mother     Stroke Mother     Heart disease Brother       Social History     Socioeconomic History    Marital status: Single   Tobacco Use    Smoking status: Never    Smokeless tobacco: Never   Substance and Sexual Activity    Alcohol use: Not Currently    Drug use: Never    Sexual activity: Not Currently     Social Determinants of Health     Financial Resource Strain: Patient Declined (3/3/2024)    Overall Financial Resource Strain (CARDIA)     Difficulty of Paying Living Expenses: Patient  declined   Food Insecurity: Patient Declined (3/3/2024)    Hunger Vital Sign     Worried About Running Out of Food in the Last Year: Patient declined     Ran Out of Food in the Last Year: Patient declined   Transportation Needs: Patient Declined (3/3/2024)    PRAPARE - Transportation     Lack of Transportation (Medical): Patient declined     Lack of Transportation (Non-Medical): Patient declined   Physical Activity: Unknown (3/3/2024)    Exercise Vital Sign     Days of Exercise per Week: Patient declined   Stress: Patient Declined (3/3/2024)    [employer] - Occupational Stress Questionnaire     Feeling of Stress : Patient declined   Social Connections: Unknown (3/3/2024)    Social Connection and Isolation Panel [NHANES]     Frequency of Communication with Friends and Family: Patient declined     Frequency of Social Gatherings with Friends and Family: Patient declined     Active Member of Clubs or Organizations: Patient declined     Attends Club or Organization Meetings: Patient declined     Marital Status: Patient declined   Housing Stability: Patient Declined (3/3/2024)    Housing Stability Vital Sign     Unable to Pay for Housing in the Last Year: Patient declined     Unstable Housing in the Last Year: Patient declined           Lab Results   Component Value Date     05/24/2023    K 4.2 05/24/2023     05/24/2023    CO2 28 05/24/2023    BUN 12 05/24/2023    CREATININE 0.69 05/24/2023    CALCIUM 9.6 05/24/2023    ANIONGAP 7 05/24/2023    ESTGFRAFRICA 157 04/13/2021    EGFRNONAA 102 06/20/2022       Lab Results   Component Value Date    CHOL 146 01/30/2024    CHOL 213 (H) 01/23/2023    CHOL 191 04/13/2021     Lab Results   Component Value Date    HDL 31 (L) 01/30/2024    HDL 34 (L) 01/23/2023    HDL 38 04/13/2021     Lab Results   Component Value Date    LDLCALC 80 01/30/2024    LDLCALC 133 01/23/2023    LDLCALC 100 04/13/2021     Lab Results   Component Value Date    TRIG  174 (H) 01/30/2024    TRIG 232 (H) 01/23/2023    TRIG 263 04/13/2021     Lab Results   Component Value Date    CHOLHDL 4.7 01/30/2024    CHOLHDL 6.3 01/23/2023    CHOLHDL 5.2 10/12/2020       Lab Results   Component Value Date    WBC 10.12 09/24/2021    HGB 10.4 (L) 09/24/2021    HCT 35.2 (L) 09/24/2021    MCV 78.7 (L) 09/24/2021     09/24/2021           Current Outpatient Medications:     amitriptyline (ELAVIL) 25 MG tablet, Take 1 tablet (25 mg total) by mouth once daily., Disp: 180 tablet, Rfl: 1    amitriptyline (ELAVIL) 50 MG tablet, Take 1 tablet (50 mg total) by mouth every evening., Disp: 90 tablet, Rfl: 0    amoxicillin (AMOXIL) 500 MG capsule, Take 1 capsule (500 mg total) by mouth 3 (three) times daily., Disp: 21 capsule, Rfl: 0    aspirin (ECOTRIN) 81 MG EC tablet, Take 1 tablet (81 mg total) by mouth once daily., Disp: 30 tablet, Rfl: 1    atorvastatin (LIPITOR) 80 MG tablet, TAKE 1 TABLET BY MOUTH EVERY EVENING *NO GRAPEFRUIT*, Disp: 30 tablet, Rfl: 1    atorvastatin (LIPITOR) 80 MG tablet, Take 1 tablet (80 mg total) by mouth once daily., Disp: 90 tablet, Rfl: 1    benzonatate (TESSALON) 200 MG capsule, Take 1 capsule (200 mg total) by mouth 2 (two) times a day., Disp: 30 capsule, Rfl: 2    blood sugar diagnostic Strp, 1 strip by Misc.(Non-Drug; Combo Route) route 3 (three) times daily., Disp: 200 strip, Rfl: 6    blood sugar diagnostic Strp, 1 strip by Misc.(Non-Drug; Combo Route) route 3 (three) times daily., Disp: 200 strip, Rfl: 6    clopidogreL (PLAVIX) 75 mg tablet, TAKE 1 TABLET BY MOUTH ONCE DAILY, Disp: 30 tablet, Rfl: 1    clopidogreL (PLAVIX) 75 mg tablet, Take 1 tablet (75 mg total) by mouth once daily., Disp: 90 tablet, Rfl: 1    cycloSPORINE (RESTASIS) 0.05 % ophthalmic emulsion, PLACE 1 DROP INTO BOTH EYES TWICE DAILY, Disp: 60 each, Rfl: 3    dexlansoprazole (DEXILANT) 60 mg capsule, Take 1 capsule (60 mg total) by mouth once daily., Disp: 30 capsule, Rfl: 5    diclofenac sodium  (VOLTAREN) 1 % Gel, Apply topically., Disp: , Rfl:     docusate sodium (COLACE) 50 MG capsule, Take by mouth., Disp: , Rfl:     ELIQUIS 2.5 mg Tab, Take 1 tablet (2.5 mg total) by mouth 2 (two) times daily., Disp: 180 tablet, Rfl: 1    ELIQUIS 2.5 mg Tab, Take 1 tablet (2.5 mg total) by mouth 2 (two) times daily., Disp: 180 tablet, Rfl: 1    EPINEPHrine (EPIPEN) 0.3 mg/0.3 mL AtIn, Inject 0.3 mLs (0.3 mg total) into the muscle once. for 1 dose, Disp: 0.3 mL, Rfl: 0    esomeprazole (NEXIUM) 40 MG capsule, TAKE 1 CAPSULE BY MOUTH EACH MORNING BEFORE BREAKFAST, Disp: 30 capsule, Rfl: 10    eszopiclone (LUNESTA) 3 mg Tab, TAKE 1 TABLET BY MOUTH EVERY NIGHT AT BEDTIME AS NEEDED FOR SLEEP *AVOID ALCOHOL *CAUSES DROWSINESS*, Disp: 30 tablet, Rfl: 1    ezetimibe (ZETIA) 10 mg tablet, Take 1 tablet (10 mg total) by mouth once daily., Disp: 90 tablet, Rfl: 1    fluticasone propionate (FLONASE) 50 mcg/actuation nasal spray, 1 spray (50 mcg total) by Each Nostril route once daily., Disp: 16 g, Rfl: 1    fluticasone propionate (FLONASE) 50 mcg/actuation nasal spray, INHALE 1 SPRAY IN EACH NOSTRIL ONCE DAILY ..SHAKE GENTLY BEFORE USE.., Disp: 16 g, Rfl: 6    fosinopriL (MONOPRIL) 20 MG tablet, Take 1 tablet (20 mg total) by mouth once daily., Disp: 90 tablet, Rfl: 3    glimepiride (AMARYL) 2 MG tablet, Take 1 tablet (2 mg total) by mouth daily with breakfast., Disp: 90 tablet, Rfl: 1    glimepiride (AMARYL) 2 MG tablet, Take 1 tablet (2 mg total) by mouth daily with breakfast., Disp: 90 tablet, Rfl: 1    HYDROcodone-acetaminophen (NORCO)  mg per tablet, TAKE 1 TABLET BY MOUTH 4 TIMES DAILY AS NEEDED ..MAY CAUSE DROWSINESS- AVOID ALCOHOL, Disp: , Rfl:     icosapent ethyL (VASCEPA) 1 gram Cap, Take 2 capsules (2 g total) by mouth 2 (two) times daily., Disp: 60 capsule, Rfl: 3    insulin glargine 100 units/mL SubQ pen, INJECT 35 UNITS UNDER THE SKIN EACH EVENING ...KEEP REFRIGERATED, Disp: 18 mL, Rfl: 4    isosorbide  mononitrate (IMDUR) 30 MG 24 hr tablet, Take 1 tablet (30 mg total) by mouth once daily., Disp: 90 tablet, Rfl: 3    LANTUS SOLOSTAR U-100 INSULIN glargine 100 units/mL SubQ pen, Inject 35 Units into the skin every evening., Disp: 12 mL, Rfl: 12    LANTUS SOLOSTAR U-100 INSULIN glargine 100 units/mL SubQ pen, Inject 35 Units into the skin every evening., Disp: 12 mL, Rfl: 4    LANTUS SOLOSTAR U-100 INSULIN glargine 100 units/mL SubQ pen, Inject 35 Units into the skin every evening., Disp: 12 mL, Rfl: 4    LIDOcaine HCl 2% (LIDOCAINE VISCOUS) 2 % Soln, by Mucous Membrane route 3 (three) times daily., Disp: 100 mL, Rfl: 0    metaxalone (SKELAXIN) 800 MG tablet, Take 400 mg by mouth., Disp: , Rfl:     methocarbamoL (ROBAXIN) 500 MG Tab, TAKE 1 TABLET BY MOUTH 3 TIMES DAILY WITH FOOD AS NEEDED ..MAY CAUSE DROWSINESS- AVOID ALCOHOL, Disp: , Rfl:     methocarbamoL (ROBAXIN) 750 MG Tab, TAKE 1 TABLET BY MOUTH 3 TIMES DAILY WITH FOOD AS NEEDED ..MAY CAUSE DROWSINESS- AVOID ALCOHOL, Disp: , Rfl:     metoprolol succinate (TOPROL-XL) 50 MG 24 hr tablet, Take 1 tablet (50 mg total) by mouth once daily., Disp: 90 tablet, Rfl: 1    metoprolol succinate (TOPROL-XL) 50 MG 24 hr tablet, Take 1 tablet (50 mg total) by mouth once daily., Disp: 30 tablet, Rfl: 1    morphine (MS CONTIN) 15 MG 12 hr tablet, TAKE 1 TABLET BY MOUTH EACH NIGHT AT BEDTIME ..MAY CAUSE DROWSINESS- AVOID ALCOHOL, Disp: , Rfl:     naproxen (NAPROSYN) 500 MG tablet, Take 1 tablet (500 mg total) by mouth 2 (two) times daily., Disp: 20 tablet, Rfl: 0    nitroGLYCERIN (NITROSTAT) 0.4 MG SL tablet, Place 1 tablet (0.4 mg total) under the tongue as needed for Chest pain., Disp: 25 tablet, Rfl: 2    nitroGLYCERIN (NITROSTAT) 0.4 MG SL tablet, DISSOLVE 1 TABLET UNDER TONGUE FOR CHEST PAIN EVERY 5 MINUTES X 3 DOSES IF NO RELIEF GO TO EMERGENCY ROOM, Disp: 25 tablet, Rfl: 2    NOVOLOG MIX 70-30FLEXPEN U-100 100 unit/mL (70-30) InPn pen, Inject 20 Units into the skin 2  "(two) times a day., Disp: 12 mL, Rfl: 12    NOVOLOG MIX 70-30FLEXPEN U-100 100 unit/mL (70-30) InPn pen, INJECT INJECT 20 UNITS UNDER THE SKIN TWICE DAILY ...KEEP REFRIGERATED, Disp: 15 mL, Rfl: 2    NOVOLOG MIX 70-30FLEXPEN U-100 100 unit/mL (70-30) InPn pen, Inject 20 Units into the skin 2 (two) times a day., Disp: 12 mL, Rfl: 5    pioglitazone (ACTOS) 15 MG tablet, Take 1 tablet (15 mg total) by mouth once daily., Disp: 90 tablet, Rfl: 1    pioglitazone (ACTOS) 15 MG tablet, Take 1 tablet (15 mg total) by mouth once daily., Disp: 90 tablet, Rfl: 1    pregabalin (LYRICA) 200 MG Cap, TAKE 1 CAPSULE BY MOUTH 3 TIMES DAILY ..MAY CAUSE DROWSINESS- AVOID ALCOHOL, Disp: 90 capsule, Rfl: 0    promethazine (PHENERGAN) 50 MG tablet, Take 1 tablet (50 mg total) by mouth every 4 to 6 hours as needed for Nausea., Disp: 60 tablet, Rfl: 1    promethazine (PHENERGAN) 50 MG tablet, TAKE 1 TABLET BY MOUTH EVERY 4-6 HOURS AS NEEDED FOR NAUSEA & VOMITING ..MAY CAUSE DROWSINESS, Disp: 30 tablet, Rfl: 1    sulfamethoxazole-trimethoprim 800-160mg (BACTRIM DS) 800-160 mg Tab, Take 1 tablet by mouth 2 (two) times daily., Disp: 20 tablet, Rfl: 0    UBRELVY 100 mg tablet, Take 1 tablet (100 mg total) by mouth daily as needed for Migraine., Disp: 90 tablet, Rfl: 1    UBRELVY 100 mg tablet, Take 1 tablet (100 mg total) by mouth daily as needed for Migraine., Disp: 90 tablet, Rfl: 1       Review of Systems   Constitutional:  Negative for chills.   Respiratory:  Negative for cough and shortness of breath.    Cardiovascular:  Negative for chest pain, palpitations, orthopnea, claudication, leg swelling and PND.   Neurological:  Negative for headaches.         Objective:   /70   Pulse 72   Resp 18   Ht 5' 7" (1.702 m)   Wt 107.5 kg (237 lb)   BMI 37.12 kg/m²     Physical Exam  Constitutional:       Appearance: Normal appearance.   Eyes:      General: No scleral icterus.  Neck:      Vascular: No carotid bruit.   Cardiovascular:      " Rate and Rhythm: Normal rate and regular rhythm.      Pulses: Normal pulses.      Heart sounds: Normal heart sounds. No murmur heard.  Pulmonary:      Effort: Pulmonary effort is normal.      Breath sounds: Normal breath sounds.   Musculoskeletal:         General: No swelling.      Right lower leg: No edema.      Left lower leg: No edema.      Comments: Left above knee amputation       Left Lower Extremity: Left leg is amputated above knee.   Neurological:      Mental Status: She is alert and oriented to person, place, and time.           Assessment:     1. Anemia, unspecified type  CBC Auto Differential    Iron and TIBC    Ferritin      2. Essential (primary) hypertension  EKG 12-lead      3. Atherosclerosis of native coronary artery of native heart without angina pectoris  NM Myocardial Perfusion Spect Multi Pharmacologic    Nuclear Stress Test      4. Peripheral artery disease              Plan:   Peripheral artery disease  S/P left BKA  Follows Dr. García    Atherosclerosis of native coronary artery of native heart without angina pectoris  Multivessel PCI in February 2021  Worsening angina  Start IMDUR 30mg qd  Lexiscan   Aspirin 81mg (aspirin desensitization), Plavix and (Eliquis 2.5mg for her complex vascular issues )      #Hyperlipidemia  Hypertriglyceridemia  [id number]   On atorvastatin   Zetia 10 mg daily.  Vascepa 2mg twice a day.    #Diabetes   Improved  FUP in 6 months

## 2024-03-11 ENCOUNTER — OFFICE VISIT (OUTPATIENT)
Dept: NEUROLOGY | Facility: CLINIC | Age: 59
End: 2024-03-11
Payer: MEDICARE

## 2024-03-11 VITALS
WEIGHT: 237 LBS | BODY MASS INDEX: 37.2 KG/M2 | HEIGHT: 67 IN | OXYGEN SATURATION: 97 % | DIASTOLIC BLOOD PRESSURE: 68 MMHG | HEART RATE: 98 BPM | SYSTOLIC BLOOD PRESSURE: 132 MMHG

## 2024-03-11 DIAGNOSIS — G43.719 INTRACTABLE CHRONIC MIGRAINE WITHOUT AURA AND WITHOUT STATUS MIGRAINOSUS: Primary | ICD-10-CM

## 2024-03-11 PROCEDURE — 99213 OFFICE O/P EST LOW 20 MIN: CPT | Mod: S$PBB,,, | Performed by: NURSE PRACTITIONER

## 2024-03-11 PROCEDURE — 99215 OFFICE O/P EST HI 40 MIN: CPT | Mod: PBBFAC | Performed by: NURSE PRACTITIONER

## 2024-03-11 RX ORDER — UBROGEPANT 100 MG/1
100 TABLET ORAL
Qty: 8 TABLET | Refills: 5 | Status: SHIPPED | OUTPATIENT
Start: 2024-03-11

## 2024-03-11 NOTE — PATIENT INSTRUCTIONS
Continue the current elavil 75mg nightly  Need new prior authorization for the Ubrelvy 100mg as needed for migraine  Prior failed imitrex and zomig but also with CAD which is contraindication for the triptans

## 2024-03-11 NOTE — PROGRESS NOTES
Subjective:       Patient ID: Anuradha Godfrey is a 58 y.o. female     Chief Complaint:    Chief Complaint   Patient presents with    Follow-up        Allergies:  Bee sting [allergen ext-venom-honey bee]; Nsaids (non-steroidal anti-inflammatory drug); Grass pollen-alyssa, standard; Neurontin [gabapentin]; and Topiramate    Current Medications:    Outpatient Encounter Medications as of 3/11/2024   Medication Sig Dispense Refill    amitriptyline (ELAVIL) 25 MG tablet Take 1 tablet (25 mg total) by mouth once daily. 180 tablet 1    amitriptyline (ELAVIL) 50 MG tablet Take 1 tablet (50 mg total) by mouth every evening. 90 tablet 0    amoxicillin (AMOXIL) 500 MG capsule Take 1 capsule (500 mg total) by mouth 3 (three) times daily. (Patient not taking: Reported on 3/11/2024) 21 capsule 0    aspirin (ECOTRIN) 81 MG EC tablet Take 1 tablet (81 mg total) by mouth once daily. 30 tablet 1    atorvastatin (LIPITOR) 80 MG tablet TAKE 1 TABLET BY MOUTH EVERY EVENING *NO GRAPEFRUIT* 30 tablet 1    atorvastatin (LIPITOR) 80 MG tablet Take 1 tablet (80 mg total) by mouth once daily. 90 tablet 1    benzonatate (TESSALON) 200 MG capsule Take 1 capsule (200 mg total) by mouth 2 (two) times a day. (Patient not taking: Reported on 3/11/2024) 30 capsule 2    blood sugar diagnostic Strp 1 strip by Misc.(Non-Drug; Combo Route) route 3 (three) times daily. 200 strip 6    blood sugar diagnostic Strp 1 strip by Misc.(Non-Drug; Combo Route) route 3 (three) times daily. 200 strip 6    clopidogreL (PLAVIX) 75 mg tablet TAKE 1 TABLET BY MOUTH ONCE DAILY 30 tablet 1    clopidogreL (PLAVIX) 75 mg tablet Take 1 tablet (75 mg total) by mouth once daily. 90 tablet 1    cycloSPORINE (RESTASIS) 0.05 % ophthalmic emulsion PLACE 1 DROP INTO BOTH EYES TWICE DAILY 60 each 3    dexlansoprazole (DEXILANT) 60 mg capsule Take 1 capsule (60 mg total) by mouth once daily. 30 capsule 5    diclofenac sodium (VOLTAREN) 1 % Gel Apply topically.      docusate  sodium (COLACE) 50 MG capsule Take by mouth.      ELIQUIS 2.5 mg Tab Take 1 tablet (2.5 mg total) by mouth 2 (two) times daily. 180 tablet 1    ELIQUIS 2.5 mg Tab Take 1 tablet (2.5 mg total) by mouth 2 (two) times daily. 180 tablet 1    EPINEPHrine (EPIPEN) 0.3 mg/0.3 mL AtIn Inject 0.3 mLs (0.3 mg total) into the muscle once. for 1 dose 0.3 mL 0    esomeprazole (NEXIUM) 40 MG capsule TAKE 1 CAPSULE BY MOUTH EACH MORNING BEFORE BREAKFAST 30 capsule 10    eszopiclone (LUNESTA) 3 mg Tab TAKE 1 TABLET BY MOUTH EVERY NIGHT AT BEDTIME AS NEEDED FOR SLEEP *AVOID ALCOHOL *CAUSES DROWSINESS* 30 tablet 1    ezetimibe (ZETIA) 10 mg tablet Take 1 tablet (10 mg total) by mouth once daily. 90 tablet 1    fluticasone propionate (FLONASE) 50 mcg/actuation nasal spray 1 spray (50 mcg total) by Each Nostril route once daily. 16 g 1    fluticasone propionate (FLONASE) 50 mcg/actuation nasal spray INHALE 1 SPRAY IN EACH NOSTRIL ONCE DAILY ..SHAKE GENTLY BEFORE USE.. 16 g 6    fosinopriL (MONOPRIL) 20 MG tablet Take 1 tablet (20 mg total) by mouth once daily. 90 tablet 3    glimepiride (AMARYL) 2 MG tablet Take 1 tablet (2 mg total) by mouth daily with breakfast. 90 tablet 1    glimepiride (AMARYL) 2 MG tablet Take 1 tablet (2 mg total) by mouth daily with breakfast. 90 tablet 1    HYDROcodone-acetaminophen (NORCO)  mg per tablet TAKE 1 TABLET BY MOUTH 4 TIMES DAILY AS NEEDED ..MAY CAUSE DROWSINESS- AVOID ALCOHOL      icosapent ethyL (VASCEPA) 1 gram Cap Take 2 capsules (2 g total) by mouth 2 (two) times daily. (Patient not taking: Reported on 3/11/2024) 60 capsule 3    insulin glargine 100 units/mL SubQ pen INJECT 35 UNITS UNDER THE SKIN EACH EVENING ...KEEP REFRIGERATED 18 mL 4    isosorbide mononitrate (IMDUR) 30 MG 24 hr tablet Take 1 tablet (30 mg total) by mouth once daily. 90 tablet 3    LANTUS SOLOSTAR U-100 INSULIN glargine 100 units/mL SubQ pen Inject 35 Units into the skin every evening. 12 mL 12    LANTUS SOLOSTAR  U-100 INSULIN glargine 100 units/mL SubQ pen Inject 35 Units into the skin every evening. 12 mL 4    LANTUS SOLOSTAR U-100 INSULIN glargine 100 units/mL SubQ pen Inject 35 Units into the skin every evening. 12 mL 4    LIDOcaine HCl 2% (LIDOCAINE VISCOUS) 2 % Soln by Mucous Membrane route 3 (three) times daily. 100 mL 0    metaxalone (SKELAXIN) 800 MG tablet Take 400 mg by mouth.      methocarbamoL (ROBAXIN) 500 MG Tab TAKE 1 TABLET BY MOUTH 3 TIMES DAILY WITH FOOD AS NEEDED ..MAY CAUSE DROWSINESS- AVOID ALCOHOL      methocarbamoL (ROBAXIN) 750 MG Tab TAKE 1 TABLET BY MOUTH 3 TIMES DAILY WITH FOOD AS NEEDED ..MAY CAUSE DROWSINESS- AVOID ALCOHOL      metoprolol succinate (TOPROL-XL) 50 MG 24 hr tablet Take 1 tablet (50 mg total) by mouth once daily. 90 tablet 1    metoprolol succinate (TOPROL-XL) 50 MG 24 hr tablet Take 1 tablet (50 mg total) by mouth once daily. 30 tablet 1    morphine (MS CONTIN) 15 MG 12 hr tablet TAKE 1 TABLET BY MOUTH EACH NIGHT AT BEDTIME ..MAY CAUSE DROWSINESS- AVOID ALCOHOL      naproxen (NAPROSYN) 500 MG tablet Take 1 tablet (500 mg total) by mouth 2 (two) times daily. 20 tablet 0    nitroGLYCERIN (NITROSTAT) 0.4 MG SL tablet Place 1 tablet (0.4 mg total) under the tongue as needed for Chest pain. 25 tablet 2    nitroGLYCERIN (NITROSTAT) 0.4 MG SL tablet DISSOLVE 1 TABLET UNDER TONGUE FOR CHEST PAIN EVERY 5 MINUTES X 3 DOSES IF NO RELIEF GO TO EMERGENCY ROOM 25 tablet 2    NOVOLOG MIX 70-30FLEXPEN U-100 100 unit/mL (70-30) InPn pen Inject 20 Units into the skin 2 (two) times a day. 12 mL 12    NOVOLOG MIX 70-30FLEXPEN U-100 100 unit/mL (70-30) InPn pen INJECT INJECT 20 UNITS UNDER THE SKIN TWICE DAILY ...KEEP REFRIGERATED 15 mL 2    NOVOLOG MIX 70-30FLEXPEN U-100 100 unit/mL (70-30) InPn pen Inject 20 Units into the skin 2 (two) times a day. 12 mL 5    pioglitazone (ACTOS) 15 MG tablet Take 1 tablet (15 mg total) by mouth once daily. 90 tablet 1    pioglitazone (ACTOS) 15 MG tablet Take 1  tablet (15 mg total) by mouth once daily. 90 tablet 1    pregabalin (LYRICA) 200 MG Cap TAKE 1 CAPSULE BY MOUTH 3 TIMES DAILY ..MAY CAUSE DROWSINESS- AVOID ALCOHOL 90 capsule 0    promethazine (PHENERGAN) 50 MG tablet Take 1 tablet (50 mg total) by mouth every 4 to 6 hours as needed for Nausea. 60 tablet 1    promethazine (PHENERGAN) 50 MG tablet TAKE 1 TABLET BY MOUTH EVERY 4-6 HOURS AS NEEDED FOR NAUSEA & VOMITING ..MAY CAUSE DROWSINESS 30 tablet 1    sulfamethoxazole-trimethoprim 800-160mg (BACTRIM DS) 800-160 mg Tab Take 1 tablet by mouth 2 (two) times daily. (Patient not taking: Reported on 3/11/2024) 20 tablet 0    ubrogepant (UBRELVY) 100 mg tablet Take 1 tablet (100 mg total) by mouth as needed for Migraine. If symptoms persist or return, may repeat dose after 2 hours. Maximum: 200 mg per 24 hours 8 tablet 5    [DISCONTINUED] fluticasone propionate (FLONASE) 50 mcg/actuation nasal spray 1 spray (50 mcg total) by Each Nostril route once daily. 16 g 6    [DISCONTINUED] fosinopriL (MONOPRIL) 20 MG tablet Take 1 tablet (20 mg total) by mouth once daily. 90 tablet 3    [DISCONTINUED] nitroGLYCERIN (NITROSTAT) 0.4 MG SL tablet DISSOLVE 1 TABLET UNDER TONGUE FOR CHEST PAIN EVERY 5 MINUTES X 3 DOSES IF NO RELIEF GO TO EMERGENCY ROOM 25 tablet 2    [DISCONTINUED] nitroGLYCERIN (NITROSTAT) 0.4 MG SL tablet DISSOLVE 1 TABLET UNDER TONGUE FOR CHEST PAIN EVERY 5 MINUTES X 3 DOSES IF NO RELIEF GO TO EMERGENCY ROOM 25 tablet 2    [DISCONTINUED] UBRELVY 100 mg tablet Take 1 tablet (100 mg total) by mouth daily as needed for Migraine. 90 tablet 1    [DISCONTINUED] UBRELVY 100 mg tablet Take 1 tablet (100 mg total) by mouth daily as needed for Migraine. 90 tablet 1     No facility-administered encounter medications on file as of 3/11/2024.       History of Present Illness  59 y/o WF following in neurology for reported migraine headaches, neuropathy, and reported memory impairment.    She has not been seen in neurology in over  2 years, having missed several prior scheduled clinic visits    Prior was on imitrex and zomig to take PRN, however had history of CAD and thus it was not recommended, actually contraindicated.  Was approved for Ubrelvy to use instead and reported it was beneficial.  However, she needs new PA as she has not been seen by us in quite some time.  Phenergan to use PRN nausea as well.    Denies overuse headaches    MIDAS score currently 18    Long standing history of migraines.  She was actually seen by Dr. Galo in 2000 through 2014 for her migraines.  She was prior treated with pamelor and topamax.  She had reaction to the topamax.  Pamelor was effective but somewhere along the way was changed to Elavil 25mg at that time which she is still currently on but now 75mg, but this is for neuropathy per pain treatment.  She is already on beta blocker metoprolol for her HTN management.  Thus this is x3 preventive treatments, but her insurance would not cover CGRP treatment.    She is poorly controlled diabetic per her report, and has LLE AKA amputation due this.     Initial MMSE was 29/30, was at that time still being evaluated for sleep apnea and at last visti this still was not complete.  Her CT head did not reflect any acute abnormalities.  She was also on multiple medications which can contribute to cognitive impairment including norco, MS contin, lyrica, and lunesta.      She has sleep apnea, but is not managed on C-pap           Review of Systems  ROS   Objective:           Physical Exam     Assessment:     Problem List Items Addressed This Visit          Neuro    Chronic migraine without aura - Primary    Relevant Medications    ubrogepant (UBRELVY) 100 mg tablet        Primary Diagnosis and ICD10  Intractable chronic migraine without aura and without status migrainosus [G43.719]    Plan:     Patient Instructions   Continue the current elavil 75mg nightly  Need new prior authorization for the Ubrelvy 100mg as needed for  migraine  Prior failed imitrex and zomig but also with CAD which is contraindication for the triptans    Medications Discontinued During This Encounter   Medication Reason    UBRELVY 100 mg tablet     UBRELVY 100 mg tablet        Requested Prescriptions     Signed Prescriptions Disp Refills    ubrogepant (UBRELVY) 100 mg tablet 8 tablet 5     Sig: Take 1 tablet (100 mg total) by mouth as needed for Migraine. If symptoms persist or return, may repeat dose after 2 hours. Maximum: 200 mg per 24 hours       No orders of the defined types were placed in this encounter.

## 2024-03-19 ENCOUNTER — TELEPHONE (OUTPATIENT)
Dept: CARDIOLOGY | Facility: CLINIC | Age: 59
End: 2024-03-19
Payer: MEDICARE

## 2024-03-19 NOTE — TELEPHONE ENCOUNTER
Pt. Called stated that she had seen abnormal labs on portal request further instructions. Pt. Advised to go to ER for low H&H/ abnormal labs per Dr. Delarosa. Pt. Voiced understanding.

## 2024-03-20 ENCOUNTER — HOSPITAL ENCOUNTER (INPATIENT)
Facility: HOSPITAL | Age: 59
LOS: 2 days | Discharge: HOME | DRG: 378 | End: 2024-03-22
Attending: EMERGENCY MEDICINE | Admitting: INTERNAL MEDICINE
Payer: MEDICARE

## 2024-03-20 DIAGNOSIS — E87.6 HYPOKALEMIA: ICD-10-CM

## 2024-03-20 DIAGNOSIS — R07.9 CHEST PAIN: ICD-10-CM

## 2024-03-20 DIAGNOSIS — D64.9 ACUTE ANEMIA: ICD-10-CM

## 2024-03-20 DIAGNOSIS — D50.0 IRON DEFICIENCY ANEMIA DUE TO CHRONIC BLOOD LOSS: ICD-10-CM

## 2024-03-20 DIAGNOSIS — K29.00 ACUTE SUPERFICIAL GASTRITIS WITHOUT HEMORRHAGE: ICD-10-CM

## 2024-03-20 DIAGNOSIS — R13.19 ESOPHAGEAL DYSPHAGIA: ICD-10-CM

## 2024-03-20 DIAGNOSIS — Z86.010 HISTORY OF COLON POLYPS: ICD-10-CM

## 2024-03-20 DIAGNOSIS — D64.9 SYMPTOMATIC ANEMIA: Primary | ICD-10-CM

## 2024-03-20 DIAGNOSIS — D12.2 ADENOMATOUS POLYP OF ASCENDING COLON: ICD-10-CM

## 2024-03-20 DIAGNOSIS — K63.89 MELANOSIS COLI: ICD-10-CM

## 2024-03-20 PROBLEM — F51.01 PRIMARY INSOMNIA: Status: ACTIVE | Noted: 2024-03-20

## 2024-03-20 PROBLEM — G89.4 CHRONIC PAIN SYNDROME: Status: ACTIVE | Noted: 2024-03-20

## 2024-03-20 LAB
ABORH RETYPE: NORMAL
ALBUMIN SERPL BCP-MCNC: 2.8 G/DL (ref 3.5–5)
ALBUMIN/GLOB SERPL: 0.6 {RATIO}
ALP SERPL-CCNC: 103 U/L (ref 46–118)
ALT SERPL W P-5'-P-CCNC: 18 U/L (ref 13–56)
ANION GAP SERPL CALCULATED.3IONS-SCNC: 9 MMOL/L (ref 7–16)
ANISOCYTOSIS BLD QL SMEAR: NORMAL
APTT PPP: 26.4 SECONDS (ref 25.2–37.3)
AST SERPL W P-5'-P-CCNC: 22 U/L (ref 15–37)
BASOPHILS # BLD AUTO: 0.08 K/UL (ref 0–0.2)
BASOPHILS NFR BLD AUTO: 0.9 % (ref 0–1)
BILIRUB SERPL-MCNC: 0.3 MG/DL (ref ?–1.2)
BUN SERPL-MCNC: 7 MG/DL (ref 7–18)
BUN/CREAT SERPL: 12 (ref 6–20)
CALCIUM SERPL-MCNC: 8.7 MG/DL (ref 8.5–10.1)
CHLORIDE SERPL-SCNC: 110 MMOL/L (ref 98–107)
CO2 SERPL-SCNC: 27 MMOL/L (ref 21–32)
CREAT SERPL-MCNC: 0.57 MG/DL (ref 0.55–1.02)
DIFFERENTIAL METHOD BLD: ABNORMAL
EGFR (NO RACE VARIABLE) (RUSH/TITUS): 105 ML/MIN/1.73M2
EOSINOPHIL # BLD AUTO: 0.35 K/UL (ref 0–0.5)
EOSINOPHIL NFR BLD AUTO: 3.8 % (ref 1–4)
ERYTHROCYTE [DISTWIDTH] IN BLOOD BY AUTOMATED COUNT: 20.5 % (ref 11.5–14.5)
FERRITIN SERPL-MCNC: 3 NG/ML (ref 8–252)
GLOBULIN SER-MCNC: 4.4 G/DL (ref 2–4)
GLUCOSE SERPL-MCNC: 135 MG/DL (ref 70–105)
GLUCOSE SERPL-MCNC: 140 MG/DL (ref 74–106)
HCT VFR BLD AUTO: 21.7 % (ref 38–47)
HCT VFR BLD AUTO: 23.3 % (ref 38–47)
HGB BLD-MCNC: 5.8 G/DL (ref 12–16)
HGB BLD-MCNC: 6 G/DL (ref 12–16)
HYPOCHROMIA BLD QL SMEAR: NORMAL
IMM GRANULOCYTES # BLD AUTO: 0.04 K/UL (ref 0–0.04)
IMM GRANULOCYTES NFR BLD: 0.4 % (ref 0–0.4)
INDIRECT COOMBS: NORMAL
INR BLD: 0.96
IRON SATN MFR SERPL: 2 % (ref 14–50)
IRON SERPL-MCNC: 11 ΜG/DL (ref 50–170)
LACTATE SERPL-SCNC: 1.1 MMOL/L (ref 0.4–2)
LYMPHOCYTES # BLD AUTO: 1.84 K/UL (ref 1–4.8)
LYMPHOCYTES NFR BLD AUTO: 20 % (ref 27–41)
MAGNESIUM SERPL-MCNC: 2 MG/DL (ref 1.7–2.3)
MCH RBC QN AUTO: 17.4 PG (ref 27–31)
MCHC RBC AUTO-ENTMCNC: 25.8 G/DL (ref 32–36)
MCV RBC AUTO: 67.5 FL (ref 80–96)
MICROCYTES BLD QL SMEAR: NORMAL
MONOCYTES # BLD AUTO: 0.7 K/UL (ref 0–0.8)
MONOCYTES NFR BLD AUTO: 7.6 % (ref 2–6)
MPC BLD CALC-MCNC: 10.2 FL (ref 9.4–12.4)
NEUTROPHILS # BLD AUTO: 6.2 K/UL (ref 1.8–7.7)
NEUTROPHILS NFR BLD AUTO: 67.3 % (ref 53–65)
NRBC # BLD AUTO: 0 X10E3/UL
NRBC, AUTO (.00): 0 %
NT-PROBNP SERPL-MCNC: 1323 PG/ML (ref 1–125)
OVALOCYTES BLD QL SMEAR: NORMAL
PLATELET # BLD AUTO: 344 K/UL (ref 150–400)
PLATELET MORPHOLOGY: NORMAL
POC OCCULT BLOOD STOOL: NEGATIVE
POTASSIUM SERPL-SCNC: 2.9 MMOL/L (ref 3.5–5.1)
PROT SERPL-MCNC: 7.2 G/DL (ref 6.4–8.2)
PROTHROMBIN TIME: 12.7 SECONDS (ref 11.7–14.7)
RBC # BLD AUTO: 3.45 M/UL (ref 4.2–5.4)
RH BLD: NORMAL
SODIUM SERPL-SCNC: 143 MMOL/L (ref 136–145)
SPECIMEN OUTDATE: NORMAL
TARGETS BLD QL SMEAR: NORMAL
TIBC SERPL-MCNC: 443 ΜG/DL (ref 250–450)
TROPONIN I SERPL DL<=0.01 NG/ML-MCNC: 15.2 PG/ML
TROPONIN I SERPL DL<=0.01 NG/ML-MCNC: 18 PG/ML
WBC # BLD AUTO: 9.21 K/UL (ref 4.5–11)

## 2024-03-20 PROCEDURE — 82272 OCCULT BLD FECES 1-3 TESTS: CPT

## 2024-03-20 PROCEDURE — 99285 EMERGENCY DEPT VISIT HI MDM: CPT | Mod: 25

## 2024-03-20 PROCEDURE — 85730 THROMBOPLASTIN TIME PARTIAL: CPT | Performed by: EMERGENCY MEDICINE

## 2024-03-20 PROCEDURE — 85610 PROTHROMBIN TIME: CPT | Performed by: EMERGENCY MEDICINE

## 2024-03-20 PROCEDURE — C9113 INJ PANTOPRAZOLE SODIUM, VIA: HCPCS | Performed by: INTERNAL MEDICINE

## 2024-03-20 PROCEDURE — 82728 ASSAY OF FERRITIN: CPT | Performed by: INTERNAL MEDICINE

## 2024-03-20 PROCEDURE — 99223 1ST HOSP IP/OBS HIGH 75: CPT | Mod: AI,,, | Performed by: INTERNAL MEDICINE

## 2024-03-20 PROCEDURE — 84484 ASSAY OF TROPONIN QUANT: CPT | Performed by: EMERGENCY MEDICINE

## 2024-03-20 PROCEDURE — 83880 ASSAY OF NATRIURETIC PEPTIDE: CPT | Performed by: EMERGENCY MEDICINE

## 2024-03-20 PROCEDURE — 63600175 PHARM REV CODE 636 W HCPCS: Performed by: INTERNAL MEDICINE

## 2024-03-20 PROCEDURE — 85045 AUTOMATED RETICULOCYTE COUNT: CPT | Performed by: INTERNAL MEDICINE

## 2024-03-20 PROCEDURE — 83540 ASSAY OF IRON: CPT | Performed by: INTERNAL MEDICINE

## 2024-03-20 PROCEDURE — 80053 COMPREHEN METABOLIC PANEL: CPT | Performed by: EMERGENCY MEDICINE

## 2024-03-20 PROCEDURE — 82962 GLUCOSE BLOOD TEST: CPT

## 2024-03-20 PROCEDURE — 25000003 PHARM REV CODE 250: Performed by: INTERNAL MEDICINE

## 2024-03-20 PROCEDURE — 93010 ELECTROCARDIOGRAM REPORT: CPT | Mod: ,,, | Performed by: STUDENT IN AN ORGANIZED HEALTH CARE EDUCATION/TRAINING PROGRAM

## 2024-03-20 PROCEDURE — 83605 ASSAY OF LACTIC ACID: CPT | Performed by: EMERGENCY MEDICINE

## 2024-03-20 PROCEDURE — 36430 TRANSFUSION BLD/BLD COMPNT: CPT

## 2024-03-20 PROCEDURE — 93005 ELECTROCARDIOGRAM TRACING: CPT

## 2024-03-20 PROCEDURE — 30233N1 TRANSFUSION OF NONAUTOLOGOUS RED BLOOD CELLS INTO PERIPHERAL VEIN, PERCUTANEOUS APPROACH: ICD-10-PCS | Performed by: INTERNAL MEDICINE

## 2024-03-20 PROCEDURE — 85018 HEMOGLOBIN: CPT | Performed by: INTERNAL MEDICINE

## 2024-03-20 PROCEDURE — 84484 ASSAY OF TROPONIN QUANT: CPT | Performed by: INTERNAL MEDICINE

## 2024-03-20 PROCEDURE — 25000003 PHARM REV CODE 250: Performed by: EMERGENCY MEDICINE

## 2024-03-20 PROCEDURE — 85025 COMPLETE CBC W/AUTO DIFF WBC: CPT | Performed by: EMERGENCY MEDICINE

## 2024-03-20 PROCEDURE — 86923 COMPATIBILITY TEST ELECTRIC: CPT | Mod: 91 | Performed by: INTERNAL MEDICINE

## 2024-03-20 PROCEDURE — 99285 EMERGENCY DEPT VISIT HI MDM: CPT | Mod: ,,, | Performed by: EMERGENCY MEDICINE

## 2024-03-20 PROCEDURE — 83735 ASSAY OF MAGNESIUM: CPT | Performed by: EMERGENCY MEDICINE

## 2024-03-20 PROCEDURE — 11000001 HC ACUTE MED/SURG PRIVATE ROOM

## 2024-03-20 PROCEDURE — 99233 SBSQ HOSP IP/OBS HIGH 50: CPT | Mod: ,,, | Performed by: INTERNAL MEDICINE

## 2024-03-20 PROCEDURE — P9016 RBC LEUKOCYTES REDUCED: HCPCS | Performed by: INTERNAL MEDICINE

## 2024-03-20 PROCEDURE — 86901 BLOOD TYPING SEROLOGIC RH(D): CPT | Performed by: EMERGENCY MEDICINE

## 2024-03-20 RX ORDER — IBUPROFEN 200 MG
16 TABLET ORAL
Status: DISCONTINUED | OUTPATIENT
Start: 2024-03-20 | End: 2024-03-22 | Stop reason: HOSPADM

## 2024-03-20 RX ORDER — ACETAMINOPHEN 500 MG
1000 TABLET ORAL EVERY 6 HOURS PRN
COMMUNITY

## 2024-03-20 RX ORDER — POTASSIUM CHLORIDE 20 MEQ/1
60 TABLET, EXTENDED RELEASE ORAL ONCE
Status: COMPLETED | OUTPATIENT
Start: 2024-03-20 | End: 2024-03-20

## 2024-03-20 RX ORDER — MORPHINE SULFATE 15 MG/1
15 TABLET, FILM COATED, EXTENDED RELEASE ORAL NIGHTLY
Status: DISCONTINUED | OUTPATIENT
Start: 2024-03-20 | End: 2024-03-22 | Stop reason: HOSPADM

## 2024-03-20 RX ORDER — ONDANSETRON HYDROCHLORIDE 2 MG/ML
4 INJECTION, SOLUTION INTRAVENOUS EVERY 8 HOURS PRN
Status: DISCONTINUED | OUTPATIENT
Start: 2024-03-20 | End: 2024-03-22 | Stop reason: HOSPADM

## 2024-03-20 RX ORDER — AMITRIPTYLINE HYDROCHLORIDE 25 MG/1
25 TABLET, FILM COATED ORAL DAILY
Status: DISCONTINUED | OUTPATIENT
Start: 2024-03-21 | End: 2024-03-22 | Stop reason: HOSPADM

## 2024-03-20 RX ORDER — GLUCAGON 1 MG
1 KIT INJECTION
Status: DISCONTINUED | OUTPATIENT
Start: 2024-03-20 | End: 2024-03-22 | Stop reason: HOSPADM

## 2024-03-20 RX ORDER — DOCUSATE SODIUM 50 MG/5ML
50 LIQUID ORAL DAILY
Status: DISCONTINUED | OUTPATIENT
Start: 2024-03-21 | End: 2024-03-22 | Stop reason: HOSPADM

## 2024-03-20 RX ORDER — HYDROCODONE BITARTRATE AND ACETAMINOPHEN 500; 5 MG/1; MG/1
TABLET ORAL
Status: DISCONTINUED | OUTPATIENT
Start: 2024-03-20 | End: 2024-03-22 | Stop reason: HOSPADM

## 2024-03-20 RX ORDER — METHOCARBAMOL 750 MG/1
750 TABLET, FILM COATED ORAL 3 TIMES DAILY PRN
Status: DISCONTINUED | OUTPATIENT
Start: 2024-03-20 | End: 2024-03-22 | Stop reason: HOSPADM

## 2024-03-20 RX ORDER — PANTOPRAZOLE SODIUM 40 MG/10ML
40 INJECTION, POWDER, LYOPHILIZED, FOR SOLUTION INTRAVENOUS 2 TIMES DAILY
Status: DISCONTINUED | OUTPATIENT
Start: 2024-03-20 | End: 2024-03-21

## 2024-03-20 RX ORDER — ACETAMINOPHEN 325 MG/1
650 TABLET ORAL EVERY 4 HOURS PRN
Status: DISCONTINUED | OUTPATIENT
Start: 2024-03-20 | End: 2024-03-22 | Stop reason: HOSPADM

## 2024-03-20 RX ORDER — METOPROLOL SUCCINATE 50 MG/1
50 TABLET, EXTENDED RELEASE ORAL DAILY
Status: DISCONTINUED | OUTPATIENT
Start: 2024-03-21 | End: 2024-03-22 | Stop reason: HOSPADM

## 2024-03-20 RX ORDER — ZOLPIDEM TARTRATE 5 MG/1
5 TABLET ORAL NIGHTLY PRN
Status: DISCONTINUED | OUTPATIENT
Start: 2024-03-20 | End: 2024-03-22 | Stop reason: HOSPADM

## 2024-03-20 RX ORDER — IBUPROFEN 200 MG
24 TABLET ORAL
Status: DISCONTINUED | OUTPATIENT
Start: 2024-03-20 | End: 2024-03-22 | Stop reason: HOSPADM

## 2024-03-20 RX ORDER — NITROGLYCERIN 0.4 MG/1
0.4 TABLET SUBLINGUAL EVERY 5 MIN PRN
Status: DISCONTINUED | OUTPATIENT
Start: 2024-03-20 | End: 2024-03-22 | Stop reason: HOSPADM

## 2024-03-20 RX ORDER — LISINOPRIL 20 MG/1
20 TABLET ORAL DAILY
Status: DISCONTINUED | OUTPATIENT
Start: 2024-03-21 | End: 2024-03-22 | Stop reason: HOSPADM

## 2024-03-20 RX ORDER — ALUMINUM HYDROXIDE, MAGNESIUM HYDROXIDE, AND SIMETHICONE 1200; 120; 1200 MG/30ML; MG/30ML; MG/30ML
30 SUSPENSION ORAL 4 TIMES DAILY PRN
Status: DISCONTINUED | OUTPATIENT
Start: 2024-03-20 | End: 2024-03-22 | Stop reason: HOSPADM

## 2024-03-20 RX ORDER — HYDROCODONE BITARTRATE AND ACETAMINOPHEN 10; 325 MG/1; MG/1
1 TABLET ORAL EVERY 6 HOURS PRN
Status: DISCONTINUED | OUTPATIENT
Start: 2024-03-20 | End: 2024-03-22 | Stop reason: HOSPADM

## 2024-03-20 RX ORDER — AMITRIPTYLINE HYDROCHLORIDE 25 MG/1
50 TABLET, FILM COATED ORAL NIGHTLY
Status: DISCONTINUED | OUTPATIENT
Start: 2024-03-20 | End: 2024-03-22 | Stop reason: HOSPADM

## 2024-03-20 RX ORDER — NALOXONE HCL 0.4 MG/ML
0.02 VIAL (ML) INJECTION
Status: DISCONTINUED | OUTPATIENT
Start: 2024-03-20 | End: 2024-03-22 | Stop reason: HOSPADM

## 2024-03-20 RX ORDER — ISOSORBIDE MONONITRATE 30 MG/1
30 TABLET, EXTENDED RELEASE ORAL DAILY
Status: DISCONTINUED | OUTPATIENT
Start: 2024-03-21 | End: 2024-03-22 | Stop reason: HOSPADM

## 2024-03-20 RX ORDER — HYDROCODONE BITARTRATE AND ACETAMINOPHEN 10; 325 MG/1; MG/1
1 TABLET ORAL
Status: COMPLETED | OUTPATIENT
Start: 2024-03-20 | End: 2024-03-20

## 2024-03-20 RX ORDER — SODIUM CHLORIDE 0.9 % (FLUSH) 0.9 %
10 SYRINGE (ML) INJECTION EVERY 12 HOURS PRN
Status: DISCONTINUED | OUTPATIENT
Start: 2024-03-20 | End: 2024-03-22 | Stop reason: HOSPADM

## 2024-03-20 RX ORDER — INSULIN ASPART 100 [IU]/ML
0-10 INJECTION, SOLUTION INTRAVENOUS; SUBCUTANEOUS
Status: DISCONTINUED | OUTPATIENT
Start: 2024-03-20 | End: 2024-03-22 | Stop reason: HOSPADM

## 2024-03-20 RX ORDER — PROMETHAZINE HYDROCHLORIDE 25 MG/1
50 TABLET ORAL EVERY 6 HOURS PRN
Status: DISCONTINUED | OUTPATIENT
Start: 2024-03-20 | End: 2024-03-22 | Stop reason: HOSPADM

## 2024-03-20 RX ORDER — ATORVASTATIN CALCIUM 80 MG/1
80 TABLET, FILM COATED ORAL DAILY
Status: DISCONTINUED | OUTPATIENT
Start: 2024-03-21 | End: 2024-03-22 | Stop reason: HOSPADM

## 2024-03-20 RX ADMIN — AMITRIPTYLINE HYDROCHLORIDE 50 MG: 25 TABLET, FILM COATED ORAL at 09:03

## 2024-03-20 RX ADMIN — MORPHINE SULFATE 15 MG: 15 TABLET, EXTENDED RELEASE ORAL at 09:03

## 2024-03-20 RX ADMIN — POTASSIUM CHLORIDE 60 MEQ: 1500 TABLET, EXTENDED RELEASE ORAL at 03:03

## 2024-03-20 RX ADMIN — INSULIN DETEMIR 20 UNITS: 100 INJECTION, SOLUTION SUBCUTANEOUS at 09:03

## 2024-03-20 RX ADMIN — PREGABALIN 200 MG: 75 CAPSULE ORAL at 09:03

## 2024-03-20 RX ADMIN — PANTOPRAZOLE SODIUM 40 MG: 40 INJECTION, POWDER, LYOPHILIZED, FOR SOLUTION INTRAVENOUS at 05:03

## 2024-03-20 RX ADMIN — HYDROCODONE BITARTRATE AND ACETAMINOPHEN 1 TABLET: 10; 325 TABLET ORAL at 01:03

## 2024-03-20 RX ADMIN — HYPROMELLOSE 2910 2 DROP: 5 SOLUTION/ DROPS OPHTHALMIC at 09:03

## 2024-03-20 RX ADMIN — INSULIN ASPART 1 UNITS: 100 INJECTION, SOLUTION INTRAVENOUS; SUBCUTANEOUS at 09:03

## 2024-03-20 RX ADMIN — SODIUM CHLORIDE: 9 INJECTION, SOLUTION INTRAVENOUS at 09:03

## 2024-03-20 NOTE — ASSESSMENT & PLAN NOTE
Follows with Dr. Delarosa.  Patient with known CAD s/p stent placement, which is uncontrolled- she had short lasting episode of chest pain which resolved on its own.  EKG did not show any new ischemic changes, troponin x 2 negative.   Will continue  metoprolol and Statin and monitor for S/Sx of angina/ACS.   Continue to monitor on telemetry.

## 2024-03-20 NOTE — ASSESSMENT & PLAN NOTE
Patient's anemia is currently uncontrolled. Has received 2 units of PRBCs on admission . Etiology likely d/t acute blood loss which was from possible GI bleed and Iron deficiency  Baseline Hgb ~ 10-11. Admission Hgb 6.0.  Iron levels suggest severe deficiency.   Current CBC reviewed-   Lab Results   Component Value Date    HGB 6.0 (L) 03/20/2024    HCT 23.3 (L) 03/20/2024     Check retic count.   IV PPI BID.  GI consulted for endoscopic evaluation as she is high risk for bleed given being on DAPT plus Eliquis- holding meds.  If endoscopy does not reveal bleeding then will expand workup.   Monitor serial CBC and transfuse if patient becomes hemodynamically unstable, symptomatic or H/H drops below 7/21.

## 2024-03-20 NOTE — H&P
" [facility] Medicine  History & Physical    Patient Name: Anuradha Godfrey  MRN: [id number]  Patient Class: IP- Inpatient  Admission Date: 3/20/2024  Attending Physician: Colten Paula MD   Primary Care Provider: Munir Garcia MD         Patient information was obtained from patient, past medical records, and ER records.     Subjective:     Principal Problem:Symptomatic anemia    Chief Complaint:   Chief Complaint   Patient presents with    Chest Pain    Abnormal Lab     Pt states she had appt today for transfusion but started feeling bad so she called ambulance         HPI: Ms. Godfrey is a 58 Y O female with PMH of CAD s/p PCI, PAD s/p left AKA, DM type II, HTN, chronic pain syndrome who presented with abnormal lab. Patient has been having fatigue, excessive tiredness and lethargy over the last 2-3 weeks. She denies melena, hematochezia, hematuria, vaginal bleeding. She had her routine lab work done on 3/11 and she went on "My Chart" to look for results and saw that her Hgb was extremely low. She called her cardiologist office and was told to come to the ED. She also reports having short lasting (10-12 min) episode of central chest heaviness radiating to her shoulder blades without associated shortness of breath, diaphoresis, nausea. She has undergone EGD in 2022 for dysphagia and had dilation done and she also had a colonoscopy in 2018 which showed a single non-cancerous polyp. She is on Aspirin, Plavix and Eliquis at home for her CAD and PAD. She denies using more than 2-3 tabs of Ibuprofen in a month.     Past Medical History:   Diagnosis Date    AAA (abdominal aortic aneurysm) 08/18/2014    Acute superficial gastritis without hemorrhage 1/4/2022    Anemia 05/16/2018    Anxiety state 07/21/2015    Celiac disease 02/29/2016    Chest pain 08/05/2014    Coronary arteriosclerosis 12/18/2018    Depressive disorder 08/18/2014    Diabetes mellitus     Diastolic " dysfunction 08/14/2018    Embolism and thrombosis of arteries of extremities 10/15/2015    Gastroesophageal reflux disease without esophagitis 07/18/2017    History of myocardial infarction 07/13/2014    Hypercholesterolemia 01/18/2016    Hypertension 07/15/2020    Insufficiency, arterial, peripheral 08/01/2019    Multi-vessel coronary artery stenosis 08/01/2019    Occlusion and stenosis of unspecified carotid artery 07/26/2019    Peripheral arterial occlusive disease 12/15/2016    Peripheral vascular disease 11/12/2020    Venous insufficiency (chronic) (peripheral) 10/15/2015       Past Surgical History:   Procedure Laterality Date    APPENDECTOMY Right     CARDIAC CATHETERIZATION  02/04/2021    PCI to prox LAD; IVUS of LAD    CHOLECYSTECTOMY      OOPHORECTOMY      TOTAL ABDOMINAL HYSTERECTOMY         Review of patient's allergies indicates:   Allergen Reactions    Bee sting [allergen ext-venom-honey bee] Anaphylaxis    Nsaids (non-steroidal anti-inflammatory drug) Anaphylaxis    Grass pollen-alyssa, standard     Neurontin [gabapentin] Hallucinations    Topiramate      Other reaction(s): Unknown       No current facility-administered medications on file prior to encounter.     Current Outpatient Medications on File Prior to Encounter   Medication Sig    acetaminophen (TYLENOL) 500 MG tablet Take 1,000 mg by mouth every 6 (six) hours as needed for Pain.    amitriptyline (ELAVIL) 25 MG tablet Take 1 tablet (25 mg total) by mouth once daily.    amitriptyline (ELAVIL) 50 MG tablet Take 1 tablet (50 mg total) by mouth every evening.    aspirin (ECOTRIN) 81 MG EC tablet Take 1 tablet (81 mg total) by mouth once daily.    atorvastatin (LIPITOR) 80 MG tablet Take 1 tablet (80 mg total) by mouth once daily.    blood sugar diagnostic Strp 1 strip by Misc.(Non-Drug; Combo Route) route 3 (three) times daily.    clopidogreL (PLAVIX) 75 mg tablet Take 1 tablet (75 mg total) by mouth once daily.    cycloSPORINE (RESTASIS) 0.05 %  ophthalmic emulsion PLACE 1 DROP INTO BOTH EYES TWICE DAILY    diclofenac sodium (VOLTAREN) 1 % Gel Apply topically.    docusate sodium (COLACE) 50 MG capsule Take 50 mg by mouth once daily.    esomeprazole (NEXIUM) 40 MG capsule TAKE 1 CAPSULE BY MOUTH EACH MORNING BEFORE BREAKFAST    eszopiclone (LUNESTA) 3 mg Tab TAKE 1 TABLET BY MOUTH EVERY NIGHT AT BEDTIME AS NEEDED FOR SLEEP *AVOID ALCOHOL *CAUSES DROWSINESS*    fosinopriL (MONOPRIL) 20 MG tablet Take 1 tablet (20 mg total) by mouth once daily.    glimepiride (AMARYL) 2 MG tablet Take 1 tablet (2 mg total) by mouth daily with breakfast.    HYDROcodone-acetaminophen (NORCO)  mg per tablet TAKE 1 TABLET BY MOUTH 4 TIMES DAILY AS NEEDED ..MAY CAUSE DROWSINESS- AVOID ALCOHOL    isosorbide mononitrate (IMDUR) 30 MG 24 hr tablet Take 1 tablet (30 mg total) by mouth once daily.    LANTUS SOLOSTAR U-100 INSULIN glargine 100 units/mL SubQ pen Inject 35 Units into the skin every evening.    methocarbamoL (ROBAXIN) 750 MG Tab TAKE 1 TABLET BY MOUTH 3 TIMES DAILY WITH FOOD AS NEEDED ..MAY CAUSE DROWSINESS- AVOID ALCOHOL    metoprolol succinate (TOPROL-XL) 50 MG 24 hr tablet Take 1 tablet (50 mg total) by mouth once daily.    morphine (MS CONTIN) 15 MG 12 hr tablet TAKE 1 TABLET BY MOUTH EACH NIGHT AT BEDTIME ..MAY CAUSE DROWSINESS- AVOID ALCOHOL    NOVOLOG MIX 70-30FLEXPEN U-100 100 unit/mL (70-30) InPn pen Inject 20 Units into the skin 2 (two) times a day.    pioglitazone (ACTOS) 15 MG tablet Take 1 tablet (15 mg total) by mouth once daily.    pregabalin (LYRICA) 200 MG Cap TAKE 1 CAPSULE BY MOUTH 3 TIMES DAILY ..MAY CAUSE DROWSINESS- AVOID ALCOHOL    promethazine (PHENERGAN) 50 MG tablet Take 1 tablet (50 mg total) by mouth every 4 to 6 hours as needed for Nausea.    ubrogepant (UBRELVY) 100 mg tablet Take 1 tablet (100 mg total) by mouth as needed for Migraine. If symptoms persist or return, may repeat dose after 2 hours. Maximum: 200 mg per 24 hours     dexlansoprazole (DEXILANT) 60 mg capsule Take 1 capsule (60 mg total) by mouth once daily.    ELIQUIS 2.5 mg Tab Take 1 tablet (2.5 mg total) by mouth 2 (two) times daily.    ezetimibe (ZETIA) 10 mg tablet Take 1 tablet (10 mg total) by mouth once daily.    fluticasone propionate (FLONASE) 50 mcg/actuation nasal spray INHALE 1 SPRAY IN EACH NOSTRIL ONCE DAILY ..SHAKE GENTLY BEFORE USE..    nitroGLYCERIN (NITROSTAT) 0.4 MG SL tablet DISSOLVE 1 TABLET UNDER TONGUE FOR CHEST PAIN EVERY 5 MINUTES X 3 DOSES IF NO RELIEF GO TO EMERGENCY ROOM    [DISCONTINUED] amoxicillin (AMOXIL) 500 MG capsule Take 1 capsule (500 mg total) by mouth 3 (three) times daily. (Patient not taking: Reported on 3/11/2024)    [DISCONTINUED] atorvastatin (LIPITOR) 80 MG tablet TAKE 1 TABLET BY MOUTH EVERY EVENING *NO GRAPEFRUIT*    [DISCONTINUED] benzonatate (TESSALON) 200 MG capsule Take 1 capsule (200 mg total) by mouth 2 (two) times a day. (Patient not taking: Reported on 3/11/2024)    [DISCONTINUED] blood sugar diagnostic Strp 1 strip by Misc.(Non-Drug; Combo Route) route 3 (three) times daily.    [DISCONTINUED] clopidogreL (PLAVIX) 75 mg tablet TAKE 1 TABLET BY MOUTH ONCE DAILY    [DISCONTINUED] ELIQUIS 2.5 mg Tab Take 1 tablet (2.5 mg total) by mouth 2 (two) times daily.    [DISCONTINUED] EPINEPHrine (EPIPEN) 0.3 mg/0.3 mL AtIn Inject 0.3 mLs (0.3 mg total) into the muscle once. for 1 dose    [DISCONTINUED] fluticasone propionate (FLONASE) 50 mcg/actuation nasal spray 1 spray (50 mcg total) by Each Nostril route once daily.    [DISCONTINUED] glimepiride (AMARYL) 2 MG tablet Take 1 tablet (2 mg total) by mouth daily with breakfast.    [DISCONTINUED] icosapent ethyL (VASCEPA) 1 gram Cap Take 2 capsules (2 g total) by mouth 2 (two) times daily. (Patient not taking: Reported on 3/11/2024)    [DISCONTINUED] insulin glargine 100 units/mL SubQ pen INJECT 35 UNITS UNDER THE SKIN EACH EVENING ...KEEP REFRIGERATED    [DISCONTINUED] FRANCO BOYCE  U-100 INSULIN glargine 100 units/mL SubQ pen Inject 35 Units into the skin every evening.    [DISCONTINUED] LANTUS SOLOSTAR U-100 INSULIN glargine 100 units/mL SubQ pen Inject 35 Units into the skin every evening.    [DISCONTINUED] LIDOcaine HCl 2% (LIDOCAINE VISCOUS) 2 % Soln by Mucous Membrane route 3 (three) times daily.    [DISCONTINUED] metaxalone (SKELAXIN) 800 MG tablet Take 400 mg by mouth.    [DISCONTINUED] methocarbamoL (ROBAXIN) 500 MG Tab TAKE 1 TABLET BY MOUTH 3 TIMES DAILY WITH FOOD AS NEEDED ..MAY CAUSE DROWSINESS- AVOID ALCOHOL    [DISCONTINUED] metoprolol succinate (TOPROL-XL) 50 MG 24 hr tablet Take 1 tablet (50 mg total) by mouth once daily.    [DISCONTINUED] naproxen (NAPROSYN) 500 MG tablet Take 1 tablet (500 mg total) by mouth 2 (two) times daily.    [DISCONTINUED] nitroGLYCERIN (NITROSTAT) 0.4 MG SL tablet Place 1 tablet (0.4 mg total) under the tongue as needed for Chest pain.    [DISCONTINUED] NOVOLOG MIX 70-30FLEXPEN U-100 100 unit/mL (70-30) InPn pen INJECT INJECT 20 UNITS UNDER THE SKIN TWICE DAILY ...KEEP REFRIGERATED    [DISCONTINUED] NOVOLOG MIX 70-30FLEXPEN U-100 100 unit/mL (70-30) InPn pen Inject 20 Units into the skin 2 (two) times a day.    [DISCONTINUED] pioglitazone (ACTOS) 15 MG tablet Take 1 tablet (15 mg total) by mouth once daily.    [DISCONTINUED] promethazine (PHENERGAN) 50 MG tablet TAKE 1 TABLET BY MOUTH EVERY 4-6 HOURS AS NEEDED FOR NAUSEA & VOMITING ..MAY CAUSE DROWSINESS    [DISCONTINUED] sulfamethoxazole-trimethoprim 800-160mg (BACTRIM DS) 800-160 mg Tab Take 1 tablet by mouth 2 (two) times daily. (Patient not taking: Reported on 3/11/2024)     Family History       Problem Relation (Age of Onset)    Heart disease Mother, Brother    Stroke Mother          Tobacco Use    Smoking status: Never    Smokeless tobacco: Never   Substance and Sexual Activity    Alcohol use: Not Currently    Drug use: Never    Sexual activity: Not Currently     Review of Systems    Constitutional:  Positive for fatigue.   Cardiovascular:  Positive for chest pain.     Objective:     Vital Signs (Most Recent):  Temp: 98.1 °F (36.7 °C) (03/20/24 1023)  Pulse: 87 (03/20/24 1344)  Resp: 16 (03/20/24 1351)  BP: (!) 158/71 (03/20/24 1344)  SpO2: 100 % (03/20/24 1344) Vital Signs (24h Range):  Temp:  [98.1 °F (36.7 °C)] 98.1 °F (36.7 °C)  Pulse:  [75-87] 87  Resp:  [11-18] 16  SpO2:  [95 %-100 %] 100 %  BP: (136-162)/() 158/71     Weight: 105.2 kg (232 lb)  Body mass index is 36.34 kg/m².     Physical Exam  Vitals and nursing note reviewed.   Constitutional:       Appearance: She is ill-appearing.   HENT:      Head: Normocephalic and atraumatic.      Mouth/Throat:      Mouth: Mucous membranes are moist.   Eyes:      Extraocular Movements: Extraocular movements intact.      Pupils: Pupils are equal, round, and reactive to light.   Cardiovascular:      Rate and Rhythm: Normal rate and regular rhythm.   Pulmonary:      Effort: Pulmonary effort is normal.      Breath sounds: Normal breath sounds.   Abdominal:      General: Bowel sounds are normal.      Palpations: Abdomen is soft.   Musculoskeletal:      Cervical back: Normal range of motion and neck supple.      Comments: S/p left AKA   Skin:     Coloration: Skin is pale.   Neurological:      General: No focal deficit present.      Mental Status: She is alert and oriented to person, place, and time. Mental status is at baseline.   Psychiatric:         Mood and Affect: Mood normal.         Behavior: Behavior normal.              CRANIAL NERVES     CN III, IV, VI   Pupils are equal, round, and reactive to light.       Significant Labs: All pertinent labs within the past 24 hours have been reviewed.    Significant Imaging: I have reviewed all pertinent imaging results/findings within the past 24 hours.  Assessment/Plan:     * Symptomatic anemia  Patient's anemia is currently uncontrolled. Has received 2 units of PRBCs on admission . Etiology likely  d/t acute blood loss which was from possible GI bleed and Iron deficiency  Baseline Hgb ~ 10-11. Admission Hgb 6.0.  Iron levels suggest severe deficiency.   Current CBC reviewed-   Lab Results   Component Value Date    HGB 6.0 (L) 03/20/2024    HCT 23.3 (L) 03/20/2024     Check retic count.   IV PPI BID.  GI consulted for endoscopic evaluation as she is high risk for bleed given being on DAPT plus Eliquis- holding meds.  If endoscopy does not reveal bleeding then will expand workup.   Monitor serial CBC and transfuse if patient becomes hemodynamically unstable, symptomatic or H/H drops below 7/21.    Primary insomnia  Resume home Elavil.  Home Lunesta substituted to Ambien due to formulary.       Chronic pain syndrome   reviewed; resume home [place]. MS Contin.       Hypokalemia  Patient has hypokalemia which is Acute and currently uncontrolled. Most recent potassium levels reviewed-   Lab Results   Component Value Date    K 2.9 (L) 03/20/2024   . Will continue potassium replacement per protocol and recheck repeat levels after replacement completed.     Gastroesophageal reflux disease  On IV PPI currently so holding PO PPI for now.       Essential (primary) hypertension  Chronic, uncontrolled. Latest blood pressure and vitals reviewed-     Temp:  [98.1 °F (36.7 °C)]   Pulse:  [75-87]   Resp:  [11-18]   BP: (136-162)/()   SpO2:  [95 %-100 %] .   Home meds for hypertension were reviewed and noted below.   Hypertension Medications               fosinopriL (MONOPRIL) 20 MG tablet Take 1 tablet (20 mg total) by mouth once daily.    isosorbide mononitrate (IMDUR) 30 MG 24 hr tablet Take 1 tablet (30 mg total) by mouth once daily.    metoprolol succinate (TOPROL-XL) 50 MG 24 hr tablet Take 1 tablet (50 mg total) by mouth once daily.    nitroGLYCERIN (NITROSTAT) 0.4 MG SL tablet DISSOLVE 1 TABLET UNDER TONGUE FOR CHEST PAIN EVERY 5 MINUTES X 3 DOSES IF NO RELIEF GO TO EMERGENCY ROOM            While in the hospital,  "will manage blood pressure as follows; Continue home antihypertensive regimen    Will utilize p.r.n. blood pressure medication only if patient's blood pressure greater than 180/110 and she develops symptoms such as worsening chest pain or shortness of breath.    Diabetic peripheral neuropathy  Resume home Lyrica 200 mg TID.     Peripheral artery disease  S/p left AKA, follows with Dr. García.  Hold home Eliquis given concern for bleed/anemia.       Atherosclerosis of native coronary artery of native heart without angina pectoris  Follows with Dr. Delarosa.  Patient with known CAD s/p stent placement, which is uncontrolled- she had short lasting episode of chest pain which resolved on its own.  EKG did not show any new ischemic changes, troponin x 2 negative.   Hold ASA, Plavix given anemia.   Will continue  metoprolol and Statin and monitor for S/Sx of angina/ACS.   Continue to monitor on telemetry.     Type 2 diabetes mellitus, without long-term current use of insulin  Patient's FSGs are controlled on current medication regimen.  Last A1c reviewed-   Lab Results   Component Value Date    HGBA1C 5.9 01/30/2024     Most recent fingerstick glucose reviewed- No results for input(s): "POCTGLUCOSE" in the last 24 hours.  Current correctional scale  Medium  Maintain anti-hyperglycemic dose as follows-   Antihyperglycemics (From admission, onward)      Start     Stop Route Frequency Ordered    03/20/24 2100  insulin detemir U-100 injection 20 Units         -- SubQ Nightly 03/20/24 1413    03/20/24 1511  insulin aspart U-100 injection 0-10 Units         -- SubQ Before meals & nightly PRN 03/20/24 1413          Hold Oral hypoglycemics while patient is in the hospital.        VTE Risk Mitigation (From admission, onward)           Ordered     IP VTE HIGH RISK PATIENT  Once         03/20/24 1413     Place sequential compression device  Until discontinued         03/20/24 1413                                    SUYAPA BROWN, " MD  Department of Hospital Medicine  [facility] - [address] [place] [address]

## 2024-03-20 NOTE — ASSESSMENT & PLAN NOTE
Chronic, uncontrolled. Latest blood pressure and vitals reviewed-     Temp:  [98.1 °F (36.7 °C)]   Pulse:  [75-87]   Resp:  [11-18]   BP: (136-162)/()   SpO2:  [95 %-100 %] .   Home meds for hypertension were reviewed and noted below.   Hypertension Medications               fosinopriL (MONOPRIL) 20 MG tablet Take 1 tablet (20 mg total) by mouth once daily.    isosorbide mononitrate (IMDUR) 30 MG 24 hr tablet Take 1 tablet (30 mg total) by mouth once daily.    metoprolol succinate (TOPROL-XL) 50 MG 24 hr tablet Take 1 tablet (50 mg total) by mouth once daily.    nitroGLYCERIN (NITROSTAT) 0.4 MG SL tablet DISSOLVE 1 TABLET UNDER TONGUE FOR CHEST PAIN EVERY 5 MINUTES X 3 DOSES IF NO RELIEF GO TO EMERGENCY ROOM            While in the hospital, will manage blood pressure as follows; Continue home antihypertensive regimen    Will utilize p.r.n. blood pressure medication only if patient's blood pressure greater than 180/110 and she develops symptoms such as worsening chest pain or shortness of breath.

## 2024-03-20 NOTE — PHARMACY MED REC
"Admission Medication History     The home medication history was taken by Tasia Schaefer.    You may go to "Admission" then "Reconcile Home Medications" tabs to review and/or act upon these items.     The home medication list has been updated by the Pharmacy department.   Please read ALL comments highlighted in yellow.   Please address this information as you see fit.    Feel free to contact us if you have any questions or require assistance.  Medication Added:  Tylenol 500 mg  I went through and removed the duplicate prescriptions found by Neha Munoz RN back on March 11th.      The medications listed below were removed from the home medication list. Please reorder if appropriate:  Epipen  Amoxil 500 mg  Benzonatate 200 mg  Bactrim -160 mg  Vascepa 1 gm  Lidocaine HCl 2% solution  Metazalone 800 mg  Naproxen 500 mg    Patient reports no longer taking the following medication(s):  Dexlansoprazole 60 mg  Zetia 10 mg  Flonase nasal spray    Medications listed below were obtained from: Patient/family and Analytic software- [employer]  (Not in a hospital admission)        Current Outpatient Medications on File Prior to Encounter   Medication Sig Dispense Refill Last Dose    acetaminophen (TYLENOL) 500 MG tablet Take 1,000 mg by mouth every 6 (six) hours as needed for Pain.       amitriptyline (ELAVIL) 25 MG tablet Take 1 tablet (25 mg total) by mouth once daily. 180 tablet 1 3/19/2024    amitriptyline (ELAVIL) 50 MG tablet Take 1 tablet (50 mg total) by mouth every evening. 90 tablet 0 3/19/2024    aspirin (ECOTRIN) 81 MG EC tablet Take 1 tablet (81 mg total) by mouth once daily. 30 tablet 1 3/19/2024    atorvastatin (LIPITOR) 80 MG tablet Take 1 tablet (80 mg total) by mouth once daily. 90 tablet 1 3/19/2024    blood sugar diagnostic Strp 1 strip by Misc.(Non-Drug; Combo Route) route 3 (three) times daily. 200 strip 6     clopidogreL (PLAVIX) 75 mg tablet Take 1 tablet (75 mg total) by mouth once daily. 90 " tablet 1 3/19/2024    cycloSPORINE (RESTASIS) 0.05 % ophthalmic emulsion PLACE 1 DROP INTO BOTH EYES TWICE DAILY 60 each 3 3/19/2024    diclofenac sodium (VOLTAREN) 1 % Gel Apply topically.   3/19/2024    docusate sodium (COLACE) 50 MG capsule Take 50 mg by mouth once daily.   3/19/2024    esomeprazole (NEXIUM) 40 MG capsule TAKE 1 CAPSULE BY MOUTH EACH MORNING BEFORE BREAKFAST 30 capsule 10 3/19/2024    eszopiclone (LUNESTA) 3 mg Tab TAKE 1 TABLET BY MOUTH EVERY NIGHT AT BEDTIME AS NEEDED FOR SLEEP *AVOID ALCOHOL *CAUSES DROWSINESS* 30 tablet 1 3/19/2024    fosinopriL (MONOPRIL) 20 MG tablet Take 1 tablet (20 mg total) by mouth once daily. 90 tablet 3 3/19/2024    glimepiride (AMARYL) 2 MG tablet Take 1 tablet (2 mg total) by mouth daily with breakfast. 90 tablet 1 3/19/2024    HYDROcodone-acetaminophen (NORCO)  mg per tablet TAKE 1 TABLET BY MOUTH 4 TIMES DAILY AS NEEDED ..MAY CAUSE DROWSINESS- AVOID ALCOHOL   3/19/2024    isosorbide mononitrate (IMDUR) 30 MG 24 hr tablet Take 1 tablet (30 mg total) by mouth once daily. 90 tablet 3 3/19/2024    LANTUS SOLOSTAR U-100 INSULIN glargine 100 units/mL SubQ pen Inject 35 Units into the skin every evening. 12 mL 4 3/19/2024    methocarbamoL (ROBAXIN) 750 MG Tab TAKE 1 TABLET BY MOUTH 3 TIMES DAILY WITH FOOD AS NEEDED ..MAY CAUSE DROWSINESS- AVOID ALCOHOL   3/19/2024    metoprolol succinate (TOPROL-XL) 50 MG 24 hr tablet Take 1 tablet (50 mg total) by mouth once daily. 30 tablet 1 3/19/2024    morphine (MS CONTIN) 15 MG 12 hr tablet TAKE 1 TABLET BY MOUTH EACH NIGHT AT BEDTIME ..MAY CAUSE DROWSINESS- AVOID ALCOHOL   3/19/2024    NOVOLOG MIX 70-30FLEXPEN U-100 100 unit/mL (70-30) InPn pen Inject 20 Units into the skin 2 (two) times a day. 12 mL 12 3/19/2024    pioglitazone (ACTOS) 15 MG tablet Take 1 tablet (15 mg total) by mouth once daily. 90 tablet 1 3/19/2024    pregabalin (LYRICA) 200 MG Cap TAKE 1 CAPSULE BY MOUTH 3 TIMES DAILY ..MAY CAUSE DROWSINESS- AVOID  ALCOHOL 90 capsule 0 3/19/2024    promethazine (PHENERGAN) 50 MG tablet Take 1 tablet (50 mg total) by mouth every 4 to 6 hours as needed for Nausea. 60 tablet 1 3/19/2024    ubrogepant (UBRELVY) 100 mg tablet Take 1 tablet (100 mg total) by mouth as needed for Migraine. If symptoms persist or return, may repeat dose after 2 hours. Maximum: 200 mg per 24 hours 8 tablet 5 3/19/2024    dexlansoprazole (DEXILANT) 60 mg capsule Take 1 capsule (60 mg total) by mouth once daily. 30 capsule 5     ELIQUIS 2.5 mg Tab Take 1 tablet (2.5 mg total) by mouth 2 (two) times daily. 180 tablet 1     ezetimibe (ZETIA) 10 mg tablet Take 1 tablet (10 mg total) by mouth once daily. 90 tablet 1     fluticasone propionate (FLONASE) 50 mcg/actuation nasal spray INHALE 1 SPRAY IN EACH NOSTRIL ONCE DAILY ..SHAKE GENTLY BEFORE USE.. 16 g 6     nitroGLYCERIN (NITROSTAT) 0.4 MG SL tablet DISSOLVE 1 TABLET UNDER TONGUE FOR CHEST PAIN EVERY 5 MINUTES X 3 DOSES IF NO RELIEF GO TO EMERGENCY ROOM 25 tablet 2     [DISCONTINUED] amoxicillin (AMOXIL) 500 MG capsule Take 1 capsule (500 mg total) by mouth 3 (three) times daily. (Patient not taking: Reported on 3/11/2024) 21 capsule 0     [DISCONTINUED] atorvastatin (LIPITOR) 80 MG tablet TAKE 1 TABLET BY MOUTH EVERY EVENING *NO GRAPEFRUIT* 30 tablet 1     [DISCONTINUED] benzonatate (TESSALON) 200 MG capsule Take 1 capsule (200 mg total) by mouth 2 (two) times a day. (Patient not taking: Reported on 3/11/2024) 30 capsule 2     [DISCONTINUED] blood sugar diagnostic Strp 1 strip by Misc.(Non-Drug; Combo Route) route 3 (three) times daily. 200 strip 6     [DISCONTINUED] clopidogreL (PLAVIX) 75 mg tablet TAKE 1 TABLET BY MOUTH ONCE DAILY 30 tablet 1     [DISCONTINUED] ELIQUIS 2.5 mg Tab Take 1 tablet (2.5 mg total) by mouth 2 (two) times daily. 180 tablet 1     [DISCONTINUED] EPINEPHrine (EPIPEN) 0.3 mg/0.3 mL AtIn Inject 0.3 mLs (0.3 mg total) into the muscle once. for 1 dose 0.3 mL 0     [DISCONTINUED]  fluticasone propionate (FLONASE) 50 mcg/actuation nasal spray 1 spray (50 mcg total) by Each Nostril route once daily. 16 g 1     [DISCONTINUED] glimepiride (AMARYL) 2 MG tablet Take 1 tablet (2 mg total) by mouth daily with breakfast. 90 tablet 1     [DISCONTINUED] icosapent ethyL (VASCEPA) 1 gram Cap Take 2 capsules (2 g total) by mouth 2 (two) times daily. (Patient not taking: Reported on 3/11/2024) 60 capsule 3     [DISCONTINUED] insulin glargine 100 units/mL SubQ pen INJECT 35 UNITS UNDER THE SKIN EACH EVENING ...KEEP REFRIGERATED 18 mL 4     [DISCONTINUED] LANTUS SOLOSTAR U-100 INSULIN glargine 100 units/mL SubQ pen Inject 35 Units into the skin every evening. 12 mL 12     [DISCONTINUED] LANTUS SOLOSTAR U-100 INSULIN glargine 100 units/mL SubQ pen Inject 35 Units into the skin every evening. 12 mL 4     [DISCONTINUED] LIDOcaine HCl 2% (LIDOCAINE VISCOUS) 2 % Soln by Mucous Membrane route 3 (three) times daily. 100 mL 0     [DISCONTINUED] metaxalone (SKELAXIN) 800 MG tablet Take 400 mg by mouth.       [DISCONTINUED] methocarbamoL (ROBAXIN) 500 MG Tab TAKE 1 TABLET BY MOUTH 3 TIMES DAILY WITH FOOD AS NEEDED ..MAY CAUSE DROWSINESS- AVOID ALCOHOL       [DISCONTINUED] metoprolol succinate (TOPROL-XL) 50 MG 24 hr tablet Take 1 tablet (50 mg total) by mouth once daily. 90 tablet 1     [DISCONTINUED] naproxen (NAPROSYN) 500 MG tablet Take 1 tablet (500 mg total) by mouth 2 (two) times daily. 20 tablet 0     [DISCONTINUED] nitroGLYCERIN (NITROSTAT) 0.4 MG SL tablet Place 1 tablet (0.4 mg total) under the tongue as needed for Chest pain. 25 tablet 2     [DISCONTINUED] NOVOLOG MIX 70-30FLEXPEN U-100 100 unit/mL (70-30) InPn pen INJECT INJECT 20 UNITS UNDER THE SKIN TWICE DAILY ...KEEP REFRIGERATED 15 mL 2     [DISCONTINUED] NOVOLOG MIX 70-30FLEXPEN U-100 100 unit/mL (70-30) InPn pen Inject 20 Units into the skin 2 (two) times a day. 12 mL 5     [DISCONTINUED] pioglitazone (ACTOS) 15 MG tablet Take 1 tablet (15 mg total)  by mouth once daily. 90 tablet 1     [DISCONTINUED] promethazine (PHENERGAN) 50 MG tablet TAKE 1 TABLET BY MOUTH EVERY 4-6 HOURS AS NEEDED FOR NAUSEA & VOMITING ..MAY CAUSE DROWSINESS 30 tablet 1     [DISCONTINUED] sulfamethoxazole-trimethoprim 800-160mg (BACTRIM DS) 800-160 mg Tab Take 1 tablet by mouth 2 (two) times daily. (Patient not taking: Reported on 3/11/2024) 20 tablet 0          Potential issues to be addressed PRIOR TO DISCHARGE  Patient reported not taking the following medications: (Dexlansoprazole 60 mg, Zetia 10 mg, Flonase nasal spray). These medications remain on the home medication list. Please address accordingly.     Tasia Schaefer  [facility] Specialist - Medication History  EXT. [phone]      .

## 2024-03-20 NOTE — ASSESSMENT & PLAN NOTE
Patient has hypokalemia which is Acute and currently uncontrolled. Most recent potassium levels reviewed-   Lab Results   Component Value Date    K 2.9 (L) 03/20/2024   . Will continue potassium replacement per protocol and recheck repeat levels after replacement completed.

## 2024-03-20 NOTE — ED PROVIDER NOTES
Encounter Date: 3/20/2024    SCRIBE #1 NOTE: I, Hayley Cavanaugh, am scribing for, and in the presence of,  Gee Moore MD. I have scribed the entire note.       History     Chief Complaint   Patient presents with    Chest Pain    Abnormal Lab     Pt states she had appt today for transfusion but started feeling bad so she called ambulance      58 y.o.female presented to the ED for chest pains. PT stated that around 8am this morning she felt like blood pumping through ears causing a migraine. PT states that after 45 min chills, chest pains, and the pain from the chest shot to her right shoulder. PT stated that she is not SOB. PT stated that Dr. Rankin advised her to come to the ED for a blood transfusion. PT has no HX of smoking. 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     The history is provided by the patient. No  was used.     Review of patient's allergies indicates:   Allergen Reactions    Bee sting [allergen ext-venom-honey bee] Anaphylaxis    Nsaids (non-steroidal anti-inflammatory drug) Anaphylaxis    Grass pollen-alyssa, standard     Neurontin [gabapentin] Hallucinations    Topiramate      Other reaction(s): Unknown     Past Medical History:   Diagnosis Date    AAA (abdominal aortic aneurysm) 08/18/2014    Acute superficial gastritis without hemorrhage 01/04/2022    Anemia 05/16/2018    Anxiety state 07/21/2015    Celiac disease 02/29/2016    Chest pain 08/05/2014    Coronary arteriosclerosis 12/18/2018    Depressive disorder 08/18/2014    Diabetes mellitus     Diastolic dysfunction 08/14/2018    Embolism and thrombosis of arteries of extremities 10/15/2015    Gastroesophageal reflux disease without esophagitis 07/18/2017    History of myocardial  infarction 07/13/2014    Hypercholesterolemia 01/18/2016    Hypertension 07/15/2020    Insufficiency, arterial, peripheral 08/01/2019    Multi-vessel coronary artery stenosis 08/01/2019    Myocardial infarct 2015    pt states she has had 5 total MI's    Occlusion and stenosis of unspecified carotid artery 07/26/2019    Peripheral arterial occlusive disease 12/15/2016    Peripheral vascular disease 11/12/2020    Venous insufficiency (chronic) (peripheral) 10/15/2015     Past Surgical History:   Procedure Laterality Date    APPENDECTOMY Right     CARDIAC CATHETERIZATION  02/04/2021    PCI to prox LAD; IVUS of LAD    CHOLECYSTECTOMY      OOPHORECTOMY      TOTAL ABDOMINAL HYSTERECTOMY       Family History   Problem Relation Age of Onset    Heart disease Mother     Stroke Mother     Heart disease Brother      Social History     Tobacco Use    Smoking status: Never    Smokeless tobacco: Never   Substance Use Topics    Alcohol use: Not Currently    Drug use: Never     Review of Systems   Constitutional:  Positive for chills.   Respiratory:  Positive for cough and shortness of breath.    Cardiovascular:  Positive for chest pain.   Gastrointestinal:  Negative for diarrhea, nausea and vomiting.   Musculoskeletal:         Pain in right shoulder    Neurological:  Positive for headaches.   All other systems reviewed and are negative.      Physical Exam     Initial Vitals [03/20/24 1023]   BP Pulse Resp Temp SpO2   (!) 162/58 87 16 98.1 °F (36.7 °C) 99 %      MAP       --         Physical Exam    Nursing note and vitals reviewed.  Constitutional: She appears well-developed and well-nourished.   HENT:   Head: Normocephalic and atraumatic.   Eyes: Conjunctivae and EOM are normal. Pupils are equal, round, and reactive to light.   Neck: Neck supple.   Normal range of motion.  Cardiovascular:  Normal rate, regular rhythm, normal heart sounds and intact distal pulses.           Pulmonary/Chest: Breath sounds normal. No respiratory  distress. She has no wheezes. She exhibits no tenderness.   Abdominal: Abdomen is soft. Bowel sounds are normal.   Musculoskeletal:         General: No tenderness or edema. Normal range of motion.      Cervical back: Normal range of motion and neck supple.     Neurological: She is alert and oriented to person, place, and time. She has normal strength.   Skin: Skin is warm and dry. Capillary refill takes less than 2 seconds.   Psychiatric: She has a normal mood and affect. Thought content normal.         ED Course   Procedures  Labs Reviewed   COMPREHENSIVE METABOLIC PANEL - Abnormal; Notable for the following components:       Result Value    Potassium 2.9 (*)     Chloride 110 (*)     Glucose 140 (*)     Albumin 2.8 (*)     Globulin 4.4 (*)     All other components within normal limits   NT-PRO NATRIURETIC PEPTIDE - Abnormal; Notable for the following components:    ProBNP 1,323 (*)     All other components within normal limits   CBC WITH DIFFERENTIAL - Abnormal; Notable for the following components:    RBC 3.45 (*)     Hemoglobin 6.0 (*)     Hematocrit 23.3 (*)     MCV 67.5 (*)     MCH 17.4 (*)     MCHC 25.8 (*)     RDW 20.5 (*)     Neutrophils % 67.3 (*)     Lymphocytes % 20.0 (*)     Monocytes % 7.6 (*)     All other components within normal limits   IRON AND TIBC - Abnormal; Notable for the following components:    Iron 11 (*)     Iron Saturation 2 (*)     All other components within normal limits   FERRITIN - Abnormal; Notable for the following components:    Ferritin 3 (*)     All other components within normal limits   APTT - Normal   MAGNESIUM - Normal   LACTIC ACID, PLASMA - Normal   PROTIME-INR - Normal   TROPONIN I - Normal   TROPONIN I - Normal   CBC W/ AUTO DIFFERENTIAL    Narrative:     The following orders were created for panel order CBC auto differential.  Procedure                               Abnormality         Status                     ---------                               -----------          ------                     CBC with Differential[[id number]]       Abnormal            Final result                 Please view results for these tests on the individual orders.   CBC MORPHOLOGY   TYPE & SCREEN   POCT OCCULT BLOOD (STOOL)          Imaging Results              X-Ray Chest AP Portable (Final result)  Result time 03/20/24 10:28:19      Final result by Gabriel Coffey DO (03/20/24 10:28:19)                   Impression:      Continued cardiomegaly without ericka pulmonary edema or focal consolidating pneumonia.    Point of Service: [facility]      Electronically signed by: Gabriel Coffey  Date:    03/20/2024  Time:    10:28               Narrative:    EXAMINATION:  XR CHEST AP PORTABLE    CLINICAL HISTORY:  Chest pain, unspecified    COMPARISON:  Chest x-ray January 30, 2024    TECHNIQUE:  Frontal view/views of the chest.    FINDINGS:  Continued cardiomegaly.  Chronic change of the lungs without focal consolidation, pleural effusion, or pneumothorax.  Visualized osseous and surrounding soft tissue structures appear grossly unchanged.                                    X-Rays:   Independently Interpreted Readings:   Other Readings:    X-Ray Chest AP Portable             Impression: Continued cardiomegaly without ericka pulmonary edema or focal consolidating pneumonia.          Medications   HYDROcodone-acetaminophen  mg per tablet 1 tablet (1 tablet Oral Given 3/20/24 1351)   potassium chloride SA CR tablet 60 mEq (60 mEq Oral Given 3/20/24 1520)   polyethylene glycol (GoLYTELY) solution (4,000 mLs Oral Given 3/21/24 1054)   ferric gluconate (FERRLECIT) 125 mg in sodium chloride 0.9% 100 mL IVPB (0 mg Intravenous Stopped 3/21/24 1150)   potassium chloride SA CR tablet 20 mEq (20 mEq Oral Given 3/21/24 1434)     Medical Decision Making  CHEST PAIN    DDX:  CARDIAC VS GI VS OTHER    ADMIT    Amount and/or Complexity of Data Reviewed  Labs: ordered. Decision-making details documented in ED  Course.  Radiology: ordered. Decision-making details documented in ED Course.  ECG/medicine tests: ordered. Decision-making details documented in ED Course.  Discussion of management or test interpretation with external provider(s): DISCUSSED WITH ADMITTING SERVICE    Risk  Prescription drug management.  Decision regarding hospitalization.              Attending Attestation:           Physician Attestation for Scribe:  Physician Attestation Statement for Scribe #1: I, Gee Moore MD, reviewed documentation, as scribed by Hayley Cavanaugh in my presence, and it is both accurate and complete.             ED Course as of 04/04/24 2107   Wed Mar 20, 2024   1447   X-Ray Chest AP Portable  Performed: 03/20/24 1026  Final           Impression: Continued cardiomegaly without ericka pulmonary edema or focal consolidating pneumonia.       [CH]      ED Course User Index  [CH] Hayley Cavanaugh                           Clinical Impression:  Final diagnoses:  [R07.9] Chest pain  [D64.9] Acute anemia          ED Disposition Condition    Admit Stable                Gee Moore MD  04/04/24 2107

## 2024-03-20 NOTE — ASSESSMENT & PLAN NOTE
Follows with Dr. Delarosa.  Patient with known CAD s/p stent placement, which is uncontrolled- she had short lasting episode of chest pain which resolved on its own.  EKG did not show any new ischemic changes, troponin x 2 negative.   Hold ASA, Plavix given anemia.   Will continue  metoprolol and Statin and monitor for S/Sx of angina/ACS.   Continue to monitor on telemetry.

## 2024-03-20 NOTE — ASSESSMENT & PLAN NOTE
"Patient's FSGs are controlled on current medication regimen.  Last A1c reviewed-   Lab Results   Component Value Date    HGBA1C 5.9 01/30/2024     Most recent fingerstick glucose reviewed- No results for input(s): "POCTGLUCOSE" in the last 24 hours.  Current correctional scale  Medium  Maintain anti-hyperglycemic dose as follows-   Antihyperglycemics (From admission, onward)      Start     Stop Route Frequency Ordered    03/20/24 2100  insulin detemir U-100 injection 20 Units         -- SubQ Nightly 03/20/24 1413    03/20/24 1511  insulin aspart U-100 injection 0-10 Units         -- SubQ Before meals & nightly PRN 03/20/24 1413          Hold Oral hypoglycemics while patient is in the hospital.    "

## 2024-03-20 NOTE — SUBJECTIVE & OBJECTIVE
Past Medical History:   Diagnosis Date    AAA (abdominal aortic aneurysm) 08/18/2014    Acute superficial gastritis without hemorrhage 1/4/2022    Anemia 05/16/2018    Anxiety state 07/21/2015    Celiac disease 02/29/2016    Chest pain 08/05/2014    Coronary arteriosclerosis 12/18/2018    Depressive disorder 08/18/2014    Diabetes mellitus     Diastolic dysfunction 08/14/2018    Embolism and thrombosis of arteries of extremities 10/15/2015    Gastroesophageal reflux disease without esophagitis 07/18/2017    History of myocardial infarction 07/13/2014    Hypercholesterolemia 01/18/2016    Hypertension 07/15/2020    Insufficiency, arterial, peripheral 08/01/2019    Multi-vessel coronary artery stenosis 08/01/2019    Occlusion and stenosis of unspecified carotid artery 07/26/2019    Peripheral arterial occlusive disease 12/15/2016    Peripheral vascular disease 11/12/2020    Venous insufficiency (chronic) (peripheral) 10/15/2015       Past Surgical History:   Procedure Laterality Date    APPENDECTOMY Right     CARDIAC CATHETERIZATION  02/04/2021    PCI to prox LAD; IVUS of LAD    CHOLECYSTECTOMY      OOPHORECTOMY      TOTAL ABDOMINAL HYSTERECTOMY         Review of patient's allergies indicates:   Allergen Reactions    Bee sting [allergen ext-venom-honey bee] Anaphylaxis    Nsaids (non-steroidal anti-inflammatory drug) Anaphylaxis    Grass pollen-alyssa, standard     Neurontin [gabapentin] Hallucinations    Topiramate      Other reaction(s): Unknown       No current facility-administered medications on file prior to encounter.     Current Outpatient Medications on File Prior to Encounter   Medication Sig    acetaminophen (TYLENOL) 500 MG tablet Take 1,000 mg by mouth every 6 (six) hours as needed for Pain.    amitriptyline (ELAVIL) 25 MG tablet Take 1 tablet (25 mg total) by mouth once daily.    amitriptyline (ELAVIL) 50 MG tablet Take 1 tablet (50 mg total) by mouth every evening.    aspirin (ECOTRIN) 81 MG EC tablet  Take 1 tablet (81 mg total) by mouth once daily.    atorvastatin (LIPITOR) 80 MG tablet Take 1 tablet (80 mg total) by mouth once daily.    blood sugar diagnostic Strp 1 strip by Misc.(Non-Drug; Combo Route) route 3 (three) times daily.    clopidogreL (PLAVIX) 75 mg tablet Take 1 tablet (75 mg total) by mouth once daily.    cycloSPORINE (RESTASIS) 0.05 % ophthalmic emulsion PLACE 1 DROP INTO BOTH EYES TWICE DAILY    diclofenac sodium (VOLTAREN) 1 % Gel Apply topically.    docusate sodium (COLACE) 50 MG capsule Take 50 mg by mouth once daily.    esomeprazole (NEXIUM) 40 MG capsule TAKE 1 CAPSULE BY MOUTH EACH MORNING BEFORE BREAKFAST    eszopiclone (LUNESTA) 3 mg Tab TAKE 1 TABLET BY MOUTH EVERY NIGHT AT BEDTIME AS NEEDED FOR SLEEP *AVOID ALCOHOL *CAUSES DROWSINESS*    fosinopriL (MONOPRIL) 20 MG tablet Take 1 tablet (20 mg total) by mouth once daily.    glimepiride (AMARYL) 2 MG tablet Take 1 tablet (2 mg total) by mouth daily with breakfast.    HYDROcodone-acetaminophen (NORCO)  mg per tablet TAKE 1 TABLET BY MOUTH 4 TIMES DAILY AS NEEDED ..MAY CAUSE DROWSINESS- AVOID ALCOHOL    isosorbide mononitrate (IMDUR) 30 MG 24 hr tablet Take 1 tablet (30 mg total) by mouth once daily.    LANTUS SOLOSTAR U-100 INSULIN glargine 100 units/mL SubQ pen Inject 35 Units into the skin every evening.    methocarbamoL (ROBAXIN) 750 MG Tab TAKE 1 TABLET BY MOUTH 3 TIMES DAILY WITH FOOD AS NEEDED ..MAY CAUSE DROWSINESS- AVOID ALCOHOL    metoprolol succinate (TOPROL-XL) 50 MG 24 hr tablet Take 1 tablet (50 mg total) by mouth once daily.    morphine (MS CONTIN) 15 MG 12 hr tablet TAKE 1 TABLET BY MOUTH EACH NIGHT AT BEDTIME ..MAY CAUSE DROWSINESS- AVOID ALCOHOL    NOVOLOG MIX 70-30FLEXPEN U-100 100 unit/mL (70-30) InPn pen Inject 20 Units into the skin 2 (two) times a day.    pioglitazone (ACTOS) 15 MG tablet Take 1 tablet (15 mg total) by mouth once daily.    pregabalin (LYRICA) 200 MG Cap TAKE 1 CAPSULE BY MOUTH 3 TIMES DAILY  ..MAY CAUSE DROWSINESS- AVOID ALCOHOL    promethazine (PHENERGAN) 50 MG tablet Take 1 tablet (50 mg total) by mouth every 4 to 6 hours as needed for Nausea.    ubrogepant (UBRELVY) 100 mg tablet Take 1 tablet (100 mg total) by mouth as needed for Migraine. If symptoms persist or return, may repeat dose after 2 hours. Maximum: 200 mg per 24 hours    dexlansoprazole (DEXILANT) 60 mg capsule Take 1 capsule (60 mg total) by mouth once daily.    ELIQUIS 2.5 mg Tab Take 1 tablet (2.5 mg total) by mouth 2 (two) times daily.    ezetimibe (ZETIA) 10 mg tablet Take 1 tablet (10 mg total) by mouth once daily.    fluticasone propionate (FLONASE) 50 mcg/actuation nasal spray INHALE 1 SPRAY IN EACH NOSTRIL ONCE DAILY ..SHAKE GENTLY BEFORE USE..    nitroGLYCERIN (NITROSTAT) 0.4 MG SL tablet DISSOLVE 1 TABLET UNDER TONGUE FOR CHEST PAIN EVERY 5 MINUTES X 3 DOSES IF NO RELIEF GO TO EMERGENCY ROOM    [DISCONTINUED] amoxicillin (AMOXIL) 500 MG capsule Take 1 capsule (500 mg total) by mouth 3 (three) times daily. (Patient not taking: Reported on 3/11/2024)    [DISCONTINUED] atorvastatin (LIPITOR) 80 MG tablet TAKE 1 TABLET BY MOUTH EVERY EVENING *NO GRAPEFRUIT*    [DISCONTINUED] benzonatate (TESSALON) 200 MG capsule Take 1 capsule (200 mg total) by mouth 2 (two) times a day. (Patient not taking: Reported on 3/11/2024)    [DISCONTINUED] blood sugar diagnostic Strp 1 strip by Misc.(Non-Drug; Combo Route) route 3 (three) times daily.    [DISCONTINUED] clopidogreL (PLAVIX) 75 mg tablet TAKE 1 TABLET BY MOUTH ONCE DAILY    [DISCONTINUED] ELIQUIS 2.5 mg Tab Take 1 tablet (2.5 mg total) by mouth 2 (two) times daily.    [DISCONTINUED] EPINEPHrine (EPIPEN) 0.3 mg/0.3 mL AtIn Inject 0.3 mLs (0.3 mg total) into the muscle once. for 1 dose    [DISCONTINUED] fluticasone propionate (FLONASE) 50 mcg/actuation nasal spray 1 spray (50 mcg total) by Each Nostril route once daily.    [DISCONTINUED] glimepiride (AMARYL) 2 MG tablet Take 1 tablet (2 mg  total) by mouth daily with breakfast.    [DISCONTINUED] icosapent ethyL (VASCEPA) 1 gram Cap Take 2 capsules (2 g total) by mouth 2 (two) times daily. (Patient not taking: Reported on 3/11/2024)    [DISCONTINUED] insulin glargine 100 units/mL SubQ pen INJECT 35 UNITS UNDER THE SKIN EACH EVENING ...KEEP REFRIGERATED    [DISCONTINUED] LANTUS SOLOSTAR U-100 INSULIN glargine 100 units/mL SubQ pen Inject 35 Units into the skin every evening.    [DISCONTINUED] LANTUS SOLOSTAR U-100 INSULIN glargine 100 units/mL SubQ pen Inject 35 Units into the skin every evening.    [DISCONTINUED] LIDOcaine HCl 2% (LIDOCAINE VISCOUS) 2 % Soln by Mucous Membrane route 3 (three) times daily.    [DISCONTINUED] metaxalone (SKELAXIN) 800 MG tablet Take 400 mg by mouth.    [DISCONTINUED] methocarbamoL (ROBAXIN) 500 MG Tab TAKE 1 TABLET BY MOUTH 3 TIMES DAILY WITH FOOD AS NEEDED ..MAY CAUSE DROWSINESS- AVOID ALCOHOL    [DISCONTINUED] metoprolol succinate (TOPROL-XL) 50 MG 24 hr tablet Take 1 tablet (50 mg total) by mouth once daily.    [DISCONTINUED] naproxen (NAPROSYN) 500 MG tablet Take 1 tablet (500 mg total) by mouth 2 (two) times daily.    [DISCONTINUED] nitroGLYCERIN (NITROSTAT) 0.4 MG SL tablet Place 1 tablet (0.4 mg total) under the tongue as needed for Chest pain.    [DISCONTINUED] NOVOLOG MIX 70-30FLEXPEN U-100 100 unit/mL (70-30) InPn pen INJECT INJECT 20 UNITS UNDER THE SKIN TWICE DAILY ...KEEP REFRIGERATED    [DISCONTINUED] NOVOLOG MIX 70-30FLEXPEN U-100 100 unit/mL (70-30) InPn pen Inject 20 Units into the skin 2 (two) times a day.    [DISCONTINUED] pioglitazone (ACTOS) 15 MG tablet Take 1 tablet (15 mg total) by mouth once daily.    [DISCONTINUED] promethazine (PHENERGAN) 50 MG tablet TAKE 1 TABLET BY MOUTH EVERY 4-6 HOURS AS NEEDED FOR NAUSEA & VOMITING ..MAY CAUSE DROWSINESS    [DISCONTINUED] sulfamethoxazole-trimethoprim 800-160mg (BACTRIM DS) 800-160 mg Tab Take 1 tablet by mouth 2 (two) times daily. (Patient not taking:  Reported on 3/11/2024)     Family History       Problem Relation (Age of Onset)    Heart disease Mother, Brother    Stroke Mother          Tobacco Use    Smoking status: Never    Smokeless tobacco: Never   Substance and Sexual Activity    Alcohol use: Not Currently    Drug use: Never    Sexual activity: Not Currently     Review of Systems   Constitutional:  Positive for fatigue.   Cardiovascular:  Positive for chest pain.     Objective:     Vital Signs (Most Recent):  Temp: 98.1 °F (36.7 °C) (03/20/24 1023)  Pulse: 87 (03/20/24 1344)  Resp: 16 (03/20/24 1351)  BP: (!) 158/71 (03/20/24 1344)  SpO2: 100 % (03/20/24 1344) Vital Signs (24h Range):  Temp:  [98.1 °F (36.7 °C)] 98.1 °F (36.7 °C)  Pulse:  [75-87] 87  Resp:  [11-18] 16  SpO2:  [95 %-100 %] 100 %  BP: (136-162)/() 158/71     Weight: 105.2 kg (232 lb)  Body mass index is 36.34 kg/m².     Physical Exam  Vitals and nursing note reviewed.   Constitutional:       Appearance: She is ill-appearing.   HENT:      Head: Normocephalic and atraumatic.      Mouth/Throat:      Mouth: Mucous membranes are moist.   Eyes:      Extraocular Movements: Extraocular movements intact.      Pupils: Pupils are equal, round, and reactive to light.   Cardiovascular:      Rate and Rhythm: Normal rate and regular rhythm.   Pulmonary:      Effort: Pulmonary effort is normal.      Breath sounds: Normal breath sounds.   Abdominal:      General: Bowel sounds are normal.      Palpations: Abdomen is soft.   Musculoskeletal:      Cervical back: Normal range of motion and neck supple.      Comments: S/p left AKA   Skin:     Coloration: Skin is pale.   Neurological:      General: No focal deficit present.      Mental Status: She is alert and oriented to person, place, and time. Mental status is at baseline.   Psychiatric:         Mood and Affect: Mood normal.         Behavior: Behavior normal.              CRANIAL NERVES     CN III, IV, VI   Pupils are equal, round, and reactive to  light.       Significant Labs: All pertinent labs within the past 24 hours have been reviewed.    Significant Imaging: I have reviewed all pertinent imaging results/findings within the past 24 hours.

## 2024-03-20 NOTE — HPI
"Ms. Godfrey is a 58 Y O female with PMH of CAD s/p PCI, PAD s/p left AKA, DM type II, HTN, chronic pain syndrome who presented with abnormal lab. Patient has been having fatigue, excessive tiredness and lethargy over the last 2-3 weeks. She denies melena, hematochezia, hematuria, vaginal bleeding. She had her routine lab work done on 3/11 and she went on "My Chart" to look for results and saw that her Hgb was extremely low. She called her cardiologist office and was told to come to the ED. She also reports having short lasting (10-12 min) episode of central chest heaviness radiating to her shoulder blades without associated shortness of breath, diaphoresis, nausea. She has undergone EGD in 2022 for dysphagia and had dilation done and she also had a colonoscopy in 2018 which showed a single non-cancerous polyp. She is on Aspirin, Plavix and Eliquis at home for her CAD and PAD. She denies using more than 2-3 tabs of Ibuprofen in a month.   "

## 2024-03-21 ENCOUNTER — ANESTHESIA EVENT (OUTPATIENT)
Dept: GASTROENTEROLOGY | Facility: HOSPITAL | Age: 59
DRG: 378 | End: 2024-03-21
Payer: MEDICARE

## 2024-03-21 ENCOUNTER — ANESTHESIA (OUTPATIENT)
Dept: GASTROENTEROLOGY | Facility: HOSPITAL | Age: 59
DRG: 378 | End: 2024-03-21
Payer: MEDICARE

## 2024-03-21 VITALS
OXYGEN SATURATION: 100 % | RESPIRATION RATE: 15 BRPM | DIASTOLIC BLOOD PRESSURE: 86 MMHG | HEART RATE: 88 BPM | SYSTOLIC BLOOD PRESSURE: 192 MMHG

## 2024-03-21 LAB
ABO + RH BLD: NORMAL
ABO + RH BLD: NORMAL
ANION GAP SERPL CALCULATED.3IONS-SCNC: 8 MMOL/L (ref 7–16)
ANISOCYTOSIS BLD QL SMEAR: ABNORMAL
BASOPHILS # BLD AUTO: 0.07 K/UL (ref 0–0.2)
BASOPHILS NFR BLD AUTO: 0.7 % (ref 0–1)
BLD PROD TYP BPU: NORMAL
BLD PROD TYP BPU: NORMAL
BLOOD UNIT EXPIRATION DATE: NORMAL
BLOOD UNIT EXPIRATION DATE: NORMAL
BLOOD UNIT TYPE CODE: 6200
BLOOD UNIT TYPE CODE: 6200
BUN SERPL-MCNC: 5 MG/DL (ref 7–18)
BUN/CREAT SERPL: 11 (ref 6–20)
CALCIUM SERPL-MCNC: 8.8 MG/DL (ref 8.5–10.1)
CHLORIDE SERPL-SCNC: 111 MMOL/L (ref 98–107)
CO2 SERPL-SCNC: 27 MMOL/L (ref 21–32)
CREAT SERPL-MCNC: 0.47 MG/DL (ref 0.55–1.02)
CROSSMATCH INTERPRETATION: NORMAL
CROSSMATCH INTERPRETATION: NORMAL
DIFFERENTIAL METHOD BLD: ABNORMAL
DISPENSE STATUS: NORMAL
DISPENSE STATUS: NORMAL
EGFR (NO RACE VARIABLE) (RUSH/TITUS): 110 ML/MIN/1.73M2
EOSINOPHIL # BLD AUTO: 0.35 K/UL (ref 0–0.5)
EOSINOPHIL NFR BLD AUTO: 3.6 % (ref 1–4)
ERYTHROCYTE [DISTWIDTH] IN BLOOD BY AUTOMATED COUNT: 21.8 % (ref 11.5–14.5)
GLUCOSE SERPL-MCNC: 100 MG/DL (ref 70–105)
GLUCOSE SERPL-MCNC: 102 MG/DL (ref 74–106)
GLUCOSE SERPL-MCNC: 106 MG/DL (ref 70–105)
GLUCOSE SERPL-MCNC: 172 MG/DL (ref 70–105)
GLUCOSE SERPL-MCNC: 85 MG/DL (ref 70–105)
GLUCOSE SERPL-MCNC: 99 MG/DL (ref 70–105)
HCT VFR BLD AUTO: 30.6 % (ref 38–47)
HCT VFR BLD AUTO: 32.4 % (ref 38–47)
HGB BLD-MCNC: 8.7 G/DL (ref 12–16)
HGB BLD-MCNC: 8.9 G/DL (ref 12–16)
HYPOCHROMIA BLD QL SMEAR: ABNORMAL
IMM GRANULOCYTES # BLD AUTO: 0.05 K/UL (ref 0–0.04)
IMM GRANULOCYTES NFR BLD: 0.5 % (ref 0–0.4)
IMM RETICS NFR: 34 % (ref 3–15.9)
LYMPHOCYTES # BLD AUTO: 2.51 K/UL (ref 1–4.8)
LYMPHOCYTES NFR BLD AUTO: 26.1 % (ref 27–41)
MCH RBC QN AUTO: 20 PG (ref 27–31)
MCHC RBC AUTO-ENTMCNC: 26.9 G/DL (ref 32–36)
MCV RBC AUTO: 74.3 FL (ref 80–96)
MICROCYTES BLD QL SMEAR: ABNORMAL
MONOCYTES # BLD AUTO: 0.94 K/UL (ref 0–0.8)
MONOCYTES NFR BLD AUTO: 9.8 % (ref 2–6)
MPC BLD CALC-MCNC: 10.1 FL (ref 9.4–12.4)
NEUTROPHILS # BLD AUTO: 5.69 K/UL (ref 1.8–7.7)
NEUTROPHILS NFR BLD AUTO: 59.3 % (ref 53–65)
NRBC # BLD AUTO: 0.02 X10E3/UL
NRBC, AUTO (.00): 0.2 %
OVALOCYTES BLD QL SMEAR: ABNORMAL
PLATELET # BLD AUTO: 346 K/UL (ref 150–400)
PLATELET MORPHOLOGY: ABNORMAL
POLYCHROMASIA BLD QL SMEAR: ABNORMAL
POTASSIUM SERPL-SCNC: 3.4 MMOL/L (ref 3.5–5.1)
RBC # BLD AUTO: 4.36 M/UL (ref 4.2–5.4)
RETICS # AUTO: 0.07 X10E6/UL (ref 0.02–0.11)
RETICS/RBC NFR AUTO: 2.2 % (ref 0.4–2.2)
SODIUM SERPL-SCNC: 143 MMOL/L (ref 136–145)
TARGETS BLD QL SMEAR: ABNORMAL
UNIT NUMBER: NORMAL
UNIT NUMBER: NORMAL
WBC # BLD AUTO: 9.61 K/UL (ref 4.5–11)

## 2024-03-21 PROCEDURE — 25000003 PHARM REV CODE 250: Performed by: INTERNAL MEDICINE

## 2024-03-21 PROCEDURE — 85018 HEMOGLOBIN: CPT | Performed by: INTERNAL MEDICINE

## 2024-03-21 PROCEDURE — 88305 TISSUE EXAM BY PATHOLOGIST: CPT | Mod: 26,,, | Performed by: PATHOLOGY

## 2024-03-21 PROCEDURE — 63600175 PHARM REV CODE 636 W HCPCS: Performed by: INTERNAL MEDICINE

## 2024-03-21 PROCEDURE — 43239 EGD BIOPSY SINGLE/MULTIPLE: CPT | Mod: ,,, | Performed by: INTERNAL MEDICINE

## 2024-03-21 PROCEDURE — 43239 EGD BIOPSY SINGLE/MULTIPLE: CPT | Performed by: INTERNAL MEDICINE

## 2024-03-21 PROCEDURE — 43450 DILATE ESOPHAGUS 1/MULT PASS: CPT | Mod: 51,,, | Performed by: INTERNAL MEDICINE

## 2024-03-21 PROCEDURE — 0DB68ZX EXCISION OF STOMACH, VIA NATURAL OR ARTIFICIAL OPENING ENDOSCOPIC, DIAGNOSTIC: ICD-10-PCS | Performed by: INTERNAL MEDICINE

## 2024-03-21 PROCEDURE — D9220A PRA ANESTHESIA: Mod: ,,, | Performed by: NURSE ANESTHETIST, CERTIFIED REGISTERED

## 2024-03-21 PROCEDURE — 88342 IMHCHEM/IMCYTCHM 1ST ANTB: CPT | Mod: 26,,, | Performed by: PATHOLOGY

## 2024-03-21 PROCEDURE — P9016 RBC LEUKOCYTES REDUCED: HCPCS | Performed by: INTERNAL MEDICINE

## 2024-03-21 PROCEDURE — 82962 GLUCOSE BLOOD TEST: CPT

## 2024-03-21 PROCEDURE — 11000001 HC ACUTE MED/SURG PRIVATE ROOM

## 2024-03-21 PROCEDURE — C9113 INJ PANTOPRAZOLE SODIUM, VIA: HCPCS | Performed by: INTERNAL MEDICINE

## 2024-03-21 PROCEDURE — 0DB78ZX EXCISION OF STOMACH, PYLORUS, VIA NATURAL OR ARTIFICIAL OPENING ENDOSCOPIC, DIAGNOSTIC: ICD-10-PCS | Performed by: INTERNAL MEDICINE

## 2024-03-21 PROCEDURE — 43450 DILATE ESOPHAGUS 1/MULT PASS: CPT | Performed by: INTERNAL MEDICINE

## 2024-03-21 PROCEDURE — 88305 TISSUE EXAM BY PATHOLOGIST: CPT | Mod: TC,SUR | Performed by: INTERNAL MEDICINE

## 2024-03-21 PROCEDURE — 25000003 PHARM REV CODE 250: Performed by: NURSE ANESTHETIST, CERTIFIED REGISTERED

## 2024-03-21 PROCEDURE — 0D758ZZ DILATION OF ESOPHAGUS, VIA NATURAL OR ARTIFICIAL OPENING ENDOSCOPIC: ICD-10-PCS | Performed by: INTERNAL MEDICINE

## 2024-03-21 PROCEDURE — 63600175 PHARM REV CODE 636 W HCPCS: Performed by: NURSE ANESTHETIST, CERTIFIED REGISTERED

## 2024-03-21 PROCEDURE — 85025 COMPLETE CBC W/AUTO DIFF WBC: CPT | Performed by: INTERNAL MEDICINE

## 2024-03-21 PROCEDURE — 99232 SBSQ HOSP IP/OBS MODERATE 35: CPT | Mod: ,,, | Performed by: INTERNAL MEDICINE

## 2024-03-21 PROCEDURE — 80048 BASIC METABOLIC PNL TOTAL CA: CPT | Performed by: INTERNAL MEDICINE

## 2024-03-21 RX ORDER — PANTOPRAZOLE SODIUM 40 MG/1
40 TABLET, DELAYED RELEASE ORAL DAILY
Status: DISCONTINUED | OUTPATIENT
Start: 2024-03-22 | End: 2024-03-22 | Stop reason: HOSPADM

## 2024-03-21 RX ORDER — POTASSIUM CHLORIDE 20 MEQ/1
20 TABLET, EXTENDED RELEASE ORAL ONCE
Status: COMPLETED | OUTPATIENT
Start: 2024-03-21 | End: 2024-03-21

## 2024-03-21 RX ORDER — BUTALBITAL, ACETAMINOPHEN AND CAFFEINE 50; 325; 40 MG/1; MG/1; MG/1
1 TABLET ORAL EVERY 4 HOURS PRN
Status: DISCONTINUED | OUTPATIENT
Start: 2024-03-21 | End: 2024-03-22 | Stop reason: HOSPADM

## 2024-03-21 RX ORDER — POLYETHYLENE GLYCOL 3350, SODIUM SULFATE ANHYDROUS, SODIUM BICARBONATE, SODIUM CHLORIDE, POTASSIUM CHLORIDE 236; 22.74; 6.74; 5.86; 2.97 G/4L; G/4L; G/4L; G/4L; G/4L
4000 POWDER, FOR SOLUTION ORAL ONCE
Status: COMPLETED | OUTPATIENT
Start: 2024-03-21 | End: 2024-03-21

## 2024-03-21 RX ORDER — LIDOCAINE HYDROCHLORIDE 20 MG/ML
INJECTION, SOLUTION EPIDURAL; INFILTRATION; INTRACAUDAL; PERINEURAL
Status: DISCONTINUED | OUTPATIENT
Start: 2024-03-21 | End: 2024-03-21

## 2024-03-21 RX ORDER — SODIUM CHLORIDE 0.9 % (FLUSH) 0.9 %
10 SYRINGE (ML) INJECTION
Status: DISCONTINUED | OUTPATIENT
Start: 2024-03-21 | End: 2024-03-22 | Stop reason: HOSPADM

## 2024-03-21 RX ORDER — PHENYLEPHRINE HYDROCHLORIDE 10 MG/ML
INJECTION INTRAVENOUS
Status: DISCONTINUED | OUTPATIENT
Start: 2024-03-21 | End: 2024-03-21

## 2024-03-21 RX ORDER — PROPOFOL 10 MG/ML
VIAL (ML) INTRAVENOUS CONTINUOUS PRN
Status: DISCONTINUED | OUTPATIENT
Start: 2024-03-21 | End: 2024-03-21

## 2024-03-21 RX ADMIN — AMITRIPTYLINE HYDROCHLORIDE 25 MG: 25 TABLET, FILM COATED ORAL at 08:03

## 2024-03-21 RX ADMIN — ONDANSETRON 4 MG: 2 INJECTION INTRAMUSCULAR; INTRAVENOUS at 12:03

## 2024-03-21 RX ADMIN — PHENYLEPHRINE HYDROCHLORIDE 100 MCG: 10 INJECTION INTRAVENOUS at 09:03

## 2024-03-21 RX ADMIN — ATORVASTATIN CALCIUM 80 MG: 80 TABLET, FILM COATED ORAL at 08:03

## 2024-03-21 RX ADMIN — PREGABALIN 200 MG: 75 CAPSULE ORAL at 02:03

## 2024-03-21 RX ADMIN — SODIUM CHLORIDE 125 MG: 9 INJECTION, SOLUTION INTRAVENOUS at 10:03

## 2024-03-21 RX ADMIN — PREGABALIN 200 MG: 75 CAPSULE ORAL at 08:03

## 2024-03-21 RX ADMIN — PANTOPRAZOLE SODIUM 40 MG: 40 INJECTION, POWDER, LYOPHILIZED, FOR SOLUTION INTRAVENOUS at 08:03

## 2024-03-21 RX ADMIN — MORPHINE SULFATE 15 MG: 15 TABLET, EXTENDED RELEASE ORAL at 08:03

## 2024-03-21 RX ADMIN — POLYETHYLENE GLYCOL 3350, SODIUM SULFATE ANHYDROUS, SODIUM BICARBONATE, SODIUM CHLORIDE, POTASSIUM CHLORIDE 4000 ML: 236; 22.74; 6.74; 5.86; 2.97 POWDER, FOR SOLUTION ORAL at 10:03

## 2024-03-21 RX ADMIN — ISOSORBIDE MONONITRATE 30 MG: 30 TABLET, EXTENDED RELEASE ORAL at 08:03

## 2024-03-21 RX ADMIN — HYPROMELLOSE 2910 2 DROP: 5 SOLUTION/ DROPS OPHTHALMIC at 08:03

## 2024-03-21 RX ADMIN — HYDROCODONE BITARTRATE AND ACETAMINOPHEN 1 TABLET: 10; 325 TABLET ORAL at 11:03

## 2024-03-21 RX ADMIN — METOPROLOL SUCCINATE 50 MG: 50 TABLET, EXTENDED RELEASE ORAL at 08:03

## 2024-03-21 RX ADMIN — SODIUM CHLORIDE: 9 INJECTION, SOLUTION INTRAVENOUS at 09:03

## 2024-03-21 RX ADMIN — LISINOPRIL 20 MG: 20 TABLET ORAL at 08:03

## 2024-03-21 RX ADMIN — HYDROCODONE BITARTRATE AND ACETAMINOPHEN 1 TABLET: 10; 325 TABLET ORAL at 04:03

## 2024-03-21 RX ADMIN — POTASSIUM CHLORIDE 20 MEQ: 1500 TABLET, EXTENDED RELEASE ORAL at 02:03

## 2024-03-21 RX ADMIN — AMITRIPTYLINE HYDROCHLORIDE 50 MG: 25 TABLET, FILM COATED ORAL at 08:03

## 2024-03-21 RX ADMIN — LIDOCAINE HYDROCHLORIDE 60 MG: 20 INJECTION, SOLUTION INTRAVENOUS at 09:03

## 2024-03-21 RX ADMIN — PROPOFOL 125 MCG/KG/MIN: 10 INJECTION, EMULSION INTRAVENOUS at 09:03

## 2024-03-21 NOTE — ANESTHESIA POSTPROCEDURE EVALUATION
Anesthesia Post Evaluation    Patient: Anuradha Godfrey    Procedure(s) Performed: * No procedures listed *    Final Anesthesia Type: general      Patient location during evaluation: GI PACU  Patient participation: Yes- Able to Participate  Level of consciousness: awake and alert  Post-procedure vital signs: reviewed and stable  Pain management: adequate  Airway patency: patent    PONV status at discharge: No PONV  Anesthetic complications: no      Cardiovascular status: blood pressure returned to baseline and hemodynamically stable  Respiratory status: spontaneous ventilation  Hydration status: euvolemic  Follow-up not needed.  Comments: Refer to nursing notes for pain/henry score upon discharge from recovery.              Vitals Value Taken Time   /89 03/21/24 1140   Temp 37.4 °C (99.4 °F) 03/21/24 1100   Pulse 101 03/21/24 1150   Resp 14 03/21/24 1148   SpO2 100 % 03/21/24 1150   Vitals shown include unvalidated device data.      Event Time   Out of Recovery 03/21/2024 10:52:02         Pain/Henry Score: Pain Rating Prior to Med Admin: 8 (3/21/2024 11:03 AM)  Pain Rating Post Med Admin: 0 (3/20/2024 10:50 PM)  Henry Score: 10 (3/21/2024 10:20 AM)

## 2024-03-21 NOTE — TRANSFER OF CARE
"Anesthesia Transfer of Care Note    Patient: Anuradha Godfrey    Procedure(s) Performed: * No procedures listed *    Patient location: GI    Anesthesia Type: general    Transport from OR: Transported from OR on room air with adequate spontaneous ventilation. Continuous ECG monitoring in transport. Continuous SpO2 monitoring in transport    Post pain: adequate analgesia    Post assessment: no apparent anesthetic complications    Post vital signs: stable    Level of consciousness: sedated and responds to stimulation    Nausea/Vomiting: no nausea/vomiting    Complications: none    Transfer of care protocol was followedComments: Good SV continue, NAD, VSS, RTRN      Last vitals: Visit Vitals  BP (!) 182/72 (BP Location: Left arm, Patient Position: Lying)   Pulse 81   Temp 36.1 °C (97 °F) (Skin)   Resp 13   Ht 5' 7" (1.702 m)   Wt 86.2 kg (190 lb)   SpO2 100%   Breastfeeding No   BMI 29.76 kg/m²     "

## 2024-03-21 NOTE — H&P
[facility] Tele[facility]  Gastroenterology  H&P    Patient Name: Anuradha Godfrey  MRN: [id number]  Admission Date: 3/20/2024  Code Status: Full Code    Attending Provider: Colten Paula MD   Primary Care Physician: Munir Gracia MD  Principal Problem:Symptomatic anemia    Subjective:     History of Present Illness: Pt c/o esophageal dysphagia and she's admitted with iron deficiency anemia.    Past Medical History:   Diagnosis Date    AAA (abdominal aortic aneurysm) 08/18/2014    Acute superficial gastritis without hemorrhage 01/04/2022    Anemia 05/16/2018    Anxiety state 07/21/2015    Celiac disease 02/29/2016    Chest pain 08/05/2014    Coronary arteriosclerosis 12/18/2018    Depressive disorder 08/18/2014    Diabetes mellitus     Diastolic dysfunction 08/14/2018    Embolism and thrombosis of arteries of extremities 10/15/2015    Gastroesophageal reflux disease without esophagitis 07/18/2017    History of myocardial infarction 07/13/2014    Hypercholesterolemia 01/18/2016    Hypertension 07/15/2020    Insufficiency, arterial, peripheral 08/01/2019    Multi-vessel coronary artery stenosis 08/01/2019    Myocardial infarct 2015    pt states she has had 5 total MI's    Occlusion and stenosis of unspecified carotid artery 07/26/2019    Peripheral arterial occlusive disease 12/15/2016    Peripheral vascular disease 11/12/2020    Venous insufficiency (chronic) (peripheral) 10/15/2015       Past Surgical History:   Procedure Laterality Date    APPENDECTOMY Right     CARDIAC CATHETERIZATION  02/04/2021    PCI to prox LAD; IVUS of LAD    CHOLECYSTECTOMY      OOPHORECTOMY      TOTAL ABDOMINAL HYSTERECTOMY         Review of patient's allergies indicates:   Allergen Reactions    Bee sting [allergen ext-venom-honey bee] Anaphylaxis    Nsaids (non-steroidal anti-inflammatory drug) Anaphylaxis    Grass pollen-alyssa, standard     Neurontin [gabapentin] Hallucinations    Topiramate      Other  reaction(s): Unknown     Family History       Problem Relation (Age of Onset)    Heart disease Mother, Brother    Stroke Mother          Tobacco Use    Smoking status: Never    Smokeless tobacco: Never   Substance and Sexual Activity    Alcohol use: Not Currently    Drug use: Never    Sexual activity: Not Currently     Review of Systems   Respiratory: Negative.     Cardiovascular: Negative.    Gastrointestinal: Negative.    Musculoskeletal:  Positive for gait problem.     Objective:     Vital Signs (Most Recent):  Temp: 98.7 °F (37.1 °C) (03/21/24 0620)  Pulse: 90 (03/21/24 0927)  Resp: 13 (03/21/24 0927)  BP: (!) 189/84 (03/21/24 0927)  SpO2: 99 % (03/21/24 0927) Vital Signs (24h Range):  Temp:  [97.9 °F (36.6 °C)-98.7 °F (37.1 °C)] 98.7 °F (37.1 °C)  Pulse:  [74-98] 90  Resp:  [11-18] 13  SpO2:  [94 %-100 %] 99 %  BP: (126-189)/() 189/84     Weight: 86.2 kg (190 lb) (03/21/24 0927)  Body mass index is 29.76 kg/m².      Intake/Output Summary (Last 24 hours) at 3/21/2024 0928  Last data filed at 3/21/2024 0327  Gross per 24 hour   Intake 1221.67 ml   Output --   Net 1221.67 ml       Lines/Drains/Airways       Peripheral Intravenous Line  Duration                  Peripheral IV - Single Lumen 03/20/24 1109 20 G Posterior;Right Wrist <1 day                    Physical Exam  Vitals reviewed.   Constitutional:       General: She is not in acute distress.     Appearance: Normal appearance. She is well-developed. She is not ill-appearing.   HENT:      Head: Normocephalic and atraumatic.      Nose: Nose normal.   Eyes:      Pupils: Pupils are equal, round, and reactive to light.   Cardiovascular:      Rate and Rhythm: Normal rate and regular rhythm.   Pulmonary:      Effort: Pulmonary effort is normal.      Breath sounds: Normal breath sounds. No wheezing.   Abdominal:      General: Abdomen is flat. Bowel sounds are normal. There is no distension.      Palpations: Abdomen is soft.      Tenderness: There is no  abdominal tenderness. There is no guarding.   Musculoskeletal:      Comments: Left aka   Skin:     General: Skin is warm and dry.      Coloration: Skin is pale. Skin is not jaundiced.   Neurological:      Mental Status: She is alert.   Psychiatric:         Attention and Perception: Attention normal.         Mood and Affect: Affect normal.         Speech: Speech normal.         Behavior: Behavior is cooperative.      Comments: Pt was calm while speaking.         Significant Labs:  CBC:   Recent Labs   Lab 03/20/24  1023 03/20/24  1950 03/21/24  0749   WBC 9.21  --  9.61   HGB 6.0* 5.8* 8.7*   HCT 23.3* 21.7* 32.4*     --  346     CMP:   Recent Labs   Lab 03/20/24  1023 03/21/24  0749   * 102   CALCIUM 8.7 8.8   ALBUMIN 2.8*  --    PROT 7.2  --     143   K 2.9* 3.4*   CO2 27 27   * 111*   BUN 7 5*   CREATININE 0.57 0.47*   ALKPHOS 103  --    ALT 18  --    AST 22  --    BILITOT 0.3  --        Significant Imaging:  Imaging results within the past 24 hours have been reviewed.    Assessment/Plan:     Active Diagnoses:    Diagnosis Date Noted POA    PRINCIPAL PROBLEM:  Symptomatic anemia [D64.9] 03/20/2024 Yes    Hypokalemia [E87.6] 03/20/2024 Yes    Chronic pain syndrome [G89.4] 03/20/2024 Yes    Primary insomnia [F51.01] 03/20/2024 Yes    Iron deficiency anemia due to chronic blood loss [D50.0] 03/20/2024 Yes    Gastroesophageal reflux disease [K21.9] 06/26/2023 Yes    Essential (primary) hypertension [I10] 11/17/2022 Yes    Diabetic peripheral neuropathy [E11.42] 09/24/2021 Yes    Peripheral artery disease [I73.9] 05/05/2021 Yes    Type 2 diabetes mellitus, without long-term current use of insulin [E11.9] 04/13/2021 Yes    Atherosclerosis of native coronary artery of native heart without angina pectoris [I25.10] 04/13/2021 Yes      Problems Resolved During this Admission:       Imp: iron deficiency anemia, esophageal dysphagia  Plan: egd with dilation    Ronald Benitez,  MD  Gastroenterology  [facility]     regular rate and rhythm

## 2024-03-21 NOTE — SUBJECTIVE & OBJECTIVE
Interval History: Patient seen and examined at the bedside, reports feeling better.     Review of Systems   Constitutional:  Positive for fatigue.   Cardiovascular:  Negative for chest pain.     Objective:     Vital Signs (Most Recent):  Temp: 99.4 °F (37.4 °C) (03/21/24 1100)  Pulse: 81 (03/21/24 1100)  Resp: 20 (03/21/24 1103)  BP: (!) 159/115 (03/21/24 1100)  SpO2: 98 % (03/21/24 1100) Vital Signs (24h Range):  Temp:  [97 °F (36.1 °C)-99.4 °F (37.4 °C)] 99.4 °F (37.4 °C)  Pulse:  [74-98] 81  Resp:  [13-20] 20  SpO2:  [94 %-100 %] 98 %  BP: (126-212)/() 159/115     Weight: 86.2 kg (190 lb)  Body mass index is 29.76 kg/m².    Intake/Output Summary (Last 24 hours) at 3/21/2024 1329  Last data filed at 3/21/2024 1003  Gross per 24 hour   Intake 1696.67 ml   Output 0 ml   Net 1696.67 ml         Physical Exam  Vitals and nursing note reviewed.   Constitutional:       Appearance: She is ill-appearing.   HENT:      Head: Normocephalic and atraumatic.      Mouth/Throat:      Mouth: Mucous membranes are moist.   Eyes:      Extraocular Movements: Extraocular movements intact.      Pupils: Pupils are equal, round, and reactive to light.   Cardiovascular:      Rate and Rhythm: Normal rate and regular rhythm.   Pulmonary:      Effort: Pulmonary effort is normal.      Breath sounds: Normal breath sounds.   Abdominal:      General: Bowel sounds are normal.      Palpations: Abdomen is soft.   Musculoskeletal:      Cervical back: Normal range of motion and neck supple.      Comments: S/p left AKA   Skin:     Coloration: Skin is pale.   Neurological:      General: No focal deficit present.      Mental Status: She is alert and oriented to person, place, and time. Mental status is at baseline.   Psychiatric:         Mood and Affect: Mood normal.         Behavior: Behavior normal.             Significant Labs: All pertinent labs within the past 24 hours have been reviewed.    Significant Imaging: I have reviewed all pertinent  imaging results/findings within the past 24 hours.

## 2024-03-21 NOTE — ANESTHESIA PREPROCEDURE EVALUATION
03/21/2024  Anuradha Godfrey is a 58 y.o., female.    Past Medical History:   Diagnosis Date    AAA (abdominal aortic aneurysm) 08/18/2014    Acute superficial gastritis without hemorrhage 01/04/2022    Anemia 05/16/2018    Anxiety state 07/21/2015    Celiac disease 02/29/2016    Chest pain 08/05/2014    Coronary arteriosclerosis 12/18/2018    Depressive disorder 08/18/2014    Diabetes mellitus     Diastolic dysfunction 08/14/2018    Embolism and thrombosis of arteries of extremities 10/15/2015    Gastroesophageal reflux disease without esophagitis 07/18/2017    History of myocardial infarction 07/13/2014    Hypercholesterolemia 01/18/2016    Hypertension 07/15/2020    Insufficiency, arterial, peripheral 08/01/2019    Multi-vessel coronary artery stenosis 08/01/2019    Myocardial infarct 2015    pt states she has had 5 total MI's    Occlusion and stenosis of unspecified carotid artery 07/26/2019    Peripheral arterial occlusive disease 12/15/2016    Peripheral vascular disease 11/12/2020    Venous insufficiency (chronic) (peripheral) 10/15/2015       Past Surgical History:   Procedure Laterality Date    APPENDECTOMY Right     CARDIAC CATHETERIZATION  02/04/2021    PCI to prox LAD; IVUS of LAD    CHOLECYSTECTOMY      OOPHORECTOMY      TOTAL ABDOMINAL HYSTERECTOMY         Family History   Problem Relation Age of Onset    Heart disease Mother     Stroke Mother     Heart disease Brother        Social History     Socioeconomic History    Marital status: Single   Tobacco Use    Smoking status: Never    Smokeless tobacco: Never   Substance and Sexual Activity    Alcohol use: Not Currently    Drug use: Never    Sexual activity: Not Currently     Social Determinants of Health     Financial Resource Strain: Patient Declined (3/3/2024)    Overall Financial Resource Strain (CARDIA)     Difficulty of Paying Living  Expenses: Patient declined   Food Insecurity: Patient Declined (3/3/2024)    Hunger Vital Sign     Worried About Running Out of Food in the Last Year: Patient declined     Ran Out of Food in the Last Year: Patient declined   Transportation Needs: Patient Declined (3/3/2024)    PRAPARE - Transportation     Lack of Transportation (Medical): Patient declined     Lack of Transportation (Non-Medical): Patient declined   Physical Activity: Unknown (3/3/2024)    Exercise Vital Sign     Days of Exercise per Week: Patient declined   Stress: Patient Declined (3/3/2024)    [employer] [place] [employer] - Occupational Stress Questionnaire     Feeling of Stress : Patient declined   Social Connections: Unknown (3/3/2024)    Social Connection and Isolation Panel [NHANES]     Frequency of Communication with Friends and Family: Patient declined     Frequency of Social Gatherings with Friends and Family: Patient declined     Active Member of Clubs or Organizations: Patient declined     Attends Club or Organization Meetings: Patient declined     Marital Status: Patient declined   Housing Stability: Patient Declined (3/3/2024)    Housing Stability Vital Sign     Unable to Pay for Housing in the Last Year: Patient declined     Unstable Housing in the Last Year: Patient declined       Current Facility-Administered Medications   Medication Dose Route Frequency Provider Last Rate Last Admin    0.9%  NaCl infusion (for blood administration)   Intravenous Q24H PRN Colten Paula MD   New Bag at 03/20/24 2154    acetaminophen tablet 650 mg  650 mg Oral Q4H PRN Colten Paula MD        aluminum-magnesium hydroxide-simethicone 200-200-20 mg/5 mL suspension 30 mL  30 mL Oral QID PRN Colten Paula MD        amitriptyline tablet 25 mg  25 mg Oral Daily Colten Paula MD   25 mg at 03/21/24 0826    amitriptyline tablet 50 mg  50 mg Oral QHS Colten Paula MD   50 mg at 03/20/24 2150    artificial tears 0.5 %  ophthalmic solution 2 drop  2 drop Both Eyes BID Colten Paula MD   2 drop at 03/20/24 2150    atorvastatin tablet 80 mg  80 mg Oral Daily Colten Paula MD   80 mg at 03/21/24 0826    dextrose 10% bolus 125 mL 125 mL  12.5 g Intravenous PRN Colten Paula MD        dextrose 10% bolus 250 mL 250 mL  25 g Intravenous PRN Colten Paula MD        docusate 50 mg/5 mL liquid 50 mg  50 mg Oral Daily Colten Paula MD        ferric gluconate (FERRLECIT) 125 mg in sodium chloride 0.9% 100 mL IVPB  125 mg Intravenous 1 time in Clinic/HOD Ronald Benitez MD        glucagon (human recombinant) injection 1 mg  1 mg Intramuscular PRN Colten Paula MD        glucose chewable tablet 16 g  16 g Oral PRN Colten Paula MD        glucose chewable tablet 24 g  24 g Oral PRN Colten Paula MD        HYDROcodone-acetaminophen  mg per tablet 1 tablet  1 tablet Oral Q6H PRN Colten Paula MD        insulin aspart U-100 injection 0-10 Units  0-10 Units Subcutaneous QID (AC + HS) PRN Colten Paula MD   1 Units at 03/20/24 2150    insulin detemir U-100 injection 20 Units  20 Units Subcutaneous QHS Colten Paula MD   20 Units at 03/20/24 2150    isosorbide mononitrate 24 hr tablet 30 mg  30 mg Oral Daily Colten Paula MD   30 mg at 03/21/24 0827    lisinopriL tablet 20 mg  20 mg Oral Daily Colten Paula MD   20 mg at 03/21/24 0826    methocarbamoL tablet 750 mg  750 mg Oral TID PRN Colten Paula MD        metoprolol succinate (TOPROL-XL) 24 hr tablet 50 mg  50 mg Oral Daily Colten Paula MD   50 mg at 03/21/24 0826    morphine 12 hr tablet 15 mg  15 mg Oral QHS Colten Paula MD   15 mg at 03/20/24 2150    naloxone 0.4 mg/mL injection 0.02 mg  0.02 mg Intravenous PRN Colten Paula MD        nitroGLYCERIN SL tablet 0.4 mg  0.4 mg Sublingual Q5 Min PRN Colten Paula MD        ondansetron injection 4 mg  4 mg Intravenous Q8H PRN Colten Paula MD   4  mg at 03/21/24 0044    pantoprazole injection 40 mg  40 mg Intravenous BID Colten Paula MD   40 mg at 03/21/24 0827    pregabalin capsule 200 mg  200 mg Oral TID Colten Paula MD   200 mg at 03/21/24 0826    promethazine tablet 50 mg  50 mg Oral Q6H PRN Colten Paula MD        sodium chloride 0.9% flush 10 mL  10 mL Intravenous Q12H PRN Colten Paula MD        zolpidem tablet 5 mg  5 mg Oral Nightly PRN Colten Paula MD           Review of patient's allergies indicates:   Allergen Reactions    Bee sting [allergen ext-venom-honey bee] Anaphylaxis    Nsaids (non-steroidal anti-inflammatory drug) Anaphylaxis    Grass pollen-alyssa, standard     Neurontin [gabapentin] Hallucinations    Topiramate      Other reaction(s): Unknown      Pre-op Assessment    I have reviewed the Patient Summary Reports.     I have reviewed the Nursing Notes. I have reviewed the NPO Status.   I have reviewed the Medications.     Review of Systems  Anesthesia Hx:  No problems with previous Anesthesia             Denies Family Hx of Anesthesia complications.    Denies Personal Hx of Anesthesia complications.                    Hematology/Oncology:    Oncology Normal                                   EENT/Dental:  EENT/Dental Normal           Cardiovascular:     Hypertension  Past MI CAD           hyperlipidemia                             Renal/:  Renal/ Normal                 Hepatic/GI:     GERD             Musculoskeletal:  Musculoskeletal Normal                Neurological:    Neuromuscular Disease,  Headaches                                 Endocrine:  Diabetes Hypothyroidism        Obesity / BMI > 30  Dermatological:  Skin Normal    Psych:  Psychiatric History                  Physical Exam  General: Well nourished, Alert, Oriented and Cooperative    Airway:  Mouth Opening: Normal  Neck ROM: Normal ROM    Chest/Lungs:  Normal Respiratory Rate    Heart:  Rate: Normal        Anesthesia Plan  Type of Anesthesia,  risks & benefits discussed:    Anesthesia Type: Gen Natural Airway, MAC  Intra-op Monitoring Plan: Standard ASA Monitors  Post Op Pain Control Plan: multimodal analgesia and IV/PO Opioids PRN  Induction:  IV  Informed Consent: Informed consent signed with the Patient and all parties understand the risks and agree with anesthesia plan.  All questions answered. Patient consented to blood products? Yes  ASA Score: 4  Day of Surgery Review of History & Physical: I have interviewed and examined the patient. I have reviewed the patient's H&P dated: There are no significant changes.   Anesthesia Plan Notes: ECHO 11/29/22   Summary    · The left ventricle is normal in size with mild concentric hypertrophy and moderately decreased systolic function.  · The estimated ejection fraction is 35%.  · Left ventricular diastolic dysfunction.  · Normal right ventricular size with normal right ventricular systolic function.  · Mild right atrial enlargement.  · Moderate mitral regurgitation.  · Normal central venous pressure (3 mmHg).  · The estimated PA systolic pressure is 34 mmHg      Ready For Surgery From Anesthesia Perspective.     .

## 2024-03-21 NOTE — PLAN OF CARE
[facility]  Initial [facility]charge Assessment       Primary Care Provider: Munir Garcia MD    Admission Diagnosis: Chest pain [R07.9]  Acute anemia [D64.9]    Admission Date: 3/20/2024  Expected Discharge Date: 3/22/2024    Transition of Care Barriers: None    Payor: MEDICARE / Plan: MEDICARE PART A & B / Product Type: Government /     Extended Emergency Contact Information  Primary Emergency Contact: Chitra Lagunas  Mobile Phone: [phone]  Relation: Daughter  Preferred language: English   needed? No    Discharge Plan A: Home  Discharge Plan B: Home      [facility] [address] [facility], [place] - [address]  [address]  [place] [address]  Phone: [phone] Fax: [phone]    [facility] - [place], [place] - [address]  [address]  [facility] [address]  Phone: [phone] Fax: [phone]    [facility] - [place], [place] - [address]  [address]  [place] [address]  Phone: [phone] Fax: [phone]    [facility], [place] - [address]  [address]  [facility] [address]  Phone: [phone] Fax: [phone]      Initial Assessment (most recent)       Adult Discharge Assessment - 03/21/24 1102          Discharge Assessment    Assessment Type Discharge Planning Assessment     Source of Information patient     Communicated ARABELLA with patient/caregiver Date not available/Unable to determine     People in Home alone     Do you expect to return to your current living situation? Yes     Do you have help at home or someone to help you manage your care at home? Yes     Who are your caregiver(s) and their phone number(s)? Medicaid Waiver     Prior to hospitilization cognitive status: Unable to Assess     Current cognitive status: Alert/Oriented     Walking or Climbing Stairs Difficulty yes     Walking or Climbing Stairs ambulation difficulty, requires equipment     Mobility Management wheelchair and  prosthetic     Dressing/Bathing Difficulty no     Home Accessibility wheelchair accessible     Home Layout Able to live on 1st floor     Equipment Currently Used at Home wheelchair;prosthesis     Patient currently being followed by outpatient case management? No     Do you currently have service(s) that help you manage your care at home? Yes     Name and Contact number of agency Medicaid waiver     Is the pt/caregiver preference to resume services with current agency Yes     Do you take prescription medications? Yes     Do you have prescription coverage? Yes     Coverage Medicare     Do you have any problems affording any of your prescribed medications? No     Is the patient taking medications as prescribed? yes     Who is going to help you get home at discharge? MTM     How do you get to doctors appointments? car, drives self;family or friend will provide;health plan transportation     Are you on dialysis? No     Do you take coumadin? No     Discharge Plan A Home     Discharge Plan B Home     DME Needed Upon Discharge  none     Discharge Plan discussed with: Patient     Transition of Care Barriers None        Physical Activity    On average, how many days per week do you engage in moderate to strenuous exercise (like a brisk walk)? 0 days     On average, how many minutes do you engage in exercise at this level? 0 min        Financial Resource Strain    How hard is it for you to pay for the very basics like food, housing, medical care, and heating? Not hard at all        Housing Stability    In the last 12 months, was there a time when you were not able to pay the mortgage or rent on time? No     In the last 12 months, how many places have you lived? 1     In the last 12 months, was there a time when you did not have a steady place to sleep or slept in a shelter (including now)? No        Transportation Needs    In the past 12 months, has lack of transportation kept you from medical appointments or from getting  medications? No     In the past 12 months, has lack of transportation kept you from meetings, work, or from getting things needed for daily living? No        Food Insecurity    Within the past 12 months, you worried that your food would run out before you got the money to buy more. Never true     Within the past 12 months, the food you bought just didn't last and you didn't have money to get more. Never true        Stress    Do you feel stress - tense, restless, nervous, or anxious, or unable to sleep at night because your mind is troubled all the time - these days? Not at all        Social Connections    In a typical week, how many times do you talk on the phone with family, friends, or neighbors? More than three times a week     How often do you get together with friends or relatives? More than three times a week     How often do you attend Jain or Protestant services? Never     Do you belong to any clubs or organizations such as Jain groups, unions, fraternal or athletic groups, or school groups? No     How often do you attend meetings of the clubs or organizations you belong to? Never     Are you , , , , never , or living with a partner? --   single       Alcohol Use    Q1: How often do you have a drink containing alcohol? Never     Q2: How many drinks containing alcohol do you have on a typical day when you are drinking? Patient does not drink     Q3: How often do you have six or more drinks on one occasion? Never                          SS spoke with pt. Pt lives at home alone.Pt is current with medicaid waiver. Pt able to do her own ADL's no voiced needs at this time. SS will follow for discharge needs as they arise.

## 2024-03-21 NOTE — ASSESSMENT & PLAN NOTE
Patient has hypokalemia which is Acute and currently uncontrolled. Most recent potassium levels reviewed-   Lab Results   Component Value Date    K 3.4 (L) 03/21/2024   . Will continue potassium replacement per protocol and recheck repeat levels after replacement completed.

## 2024-03-21 NOTE — PROGRESS NOTES
"[facility]  Progress Note    Patient Name: Anuradha Godfrey  MRN: [id number]  Patient Class: IP- Inpatient   Admission Date: 3/20/2024  Length of Stay: 1 days  Attending Physician: Colten Paula MD  Primary Care Provider: Munir Garcia MD        Subjective:     Principal Problem:Symptomatic anemia        HPI:  Ms. Godfrey is a 58 Y O female with PMH of CAD s/p PCI, PAD s/p left AKA, DM type II, HTN, chronic pain syndrome who presented with abnormal lab. Patient has been having fatigue, excessive tiredness and lethargy over the last 2-3 weeks. She denies melena, hematochezia, hematuria, vaginal bleeding. She had her routine lab work done on 3/11 and she went on "My Chart" to look for results and saw that her Hgb was extremely low. She called her cardiologist office and was told to come to the ED. She also reports having short lasting (10-12 min) episode of central chest heaviness radiating to her shoulder blades without associated shortness of breath, diaphoresis, nausea. She has undergone EGD in 2022 for dysphagia and had dilation done and she also had a colonoscopy in 2018 which showed a single non-cancerous polyp. She is on Aspirin, Plavix and Eliquis at home for her CAD and PAD. She denies using more than 2-3 tabs of Ibuprofen in a month.     Overview/Hospital Course:  3/21- s/p EGD which showed gastritis without bleeding. Plan for colonoscopy tomorrow. Hgb improved to 8.7.    Interval History: Patient seen and examined at the bedside, reports feeling better.     Review of Systems   Constitutional:  Positive for fatigue.   Cardiovascular:  Negative for chest pain.     Objective:     Vital Signs (Most Recent):  Temp: 99.4 °F (37.4 °C) (03/21/24 1100)  Pulse: 81 (03/21/24 1100)  Resp: 20 (03/21/24 1103)  BP: (!) 159/115 (03/21/24 1100)  SpO2: 98 % (03/21/24 1100) Vital Signs (24h Range):  Temp:  [97 °F (36.1 °C)-99.4 °F (37.4 °C)] 99.4 °F (37.4 " °C)  Pulse:  [74-98] 81  Resp:  [13-20] 20  SpO2:  [94 %-100 %] 98 %  BP: (126-212)/() 159/115     Weight: 86.2 kg (190 lb)  Body mass index is 29.76 kg/m².    Intake/Output Summary (Last 24 hours) at 3/21/2024 1329  Last data filed at 3/21/2024 1003  Gross per 24 hour   Intake 1696.67 ml   Output 0 ml   Net 1696.67 ml         Physical Exam  Vitals and nursing note reviewed.   Constitutional:       Appearance: She is ill-appearing.   HENT:      Head: Normocephalic and atraumatic.      Mouth/Throat:      Mouth: Mucous membranes are moist.   Eyes:      Extraocular Movements: Extraocular movements intact.      Pupils: Pupils are equal, round, and reactive to light.   Cardiovascular:      Rate and Rhythm: Normal rate and regular rhythm.   Pulmonary:      Effort: Pulmonary effort is normal.      Breath sounds: Normal breath sounds.   Abdominal:      General: Bowel sounds are normal.      Palpations: Abdomen is soft.   Musculoskeletal:      Cervical back: Normal range of motion and neck supple.      Comments: S/p left AKA   Skin:     Coloration: Skin is pale.   Neurological:      General: No focal deficit present.      Mental Status: She is alert and oriented to person, place, and time. Mental status is at baseline.   Psychiatric:         Mood and Affect: Mood normal.         Behavior: Behavior normal.             Significant Labs: All pertinent labs within the past 24 hours have been reviewed.    Significant Imaging: I have reviewed all pertinent imaging results/findings within the past 24 hours.    Assessment/Plan:      * Symptomatic anemia  Patient's anemia is currently uncontrolled. Has received 2 units of PRBCs on admission . Etiology likely d/t acute blood loss which was from possible GI bleed and Iron deficiency  Baseline Hgb ~ 10-11. Admission Hgb 6.0.  After 2 units, Hgb improved to 8.7.  Iron levels suggest severe deficiency, s/p IV iron.   Retic count elevated which is appropriate.   Current CBC reviewed-    Lab Results   Component Value Date    HGB 8.7 (L) 03/21/2024    HCT 32.4 (L) 03/21/2024       GI consulted for endoscopic evaluation as she is high risk for bleed given being on DAPT plus Eliquis- holding meds.  S/p EGD (3/21) which showed gastritis without bleeding.  Plan for colonoscopy tomorrow.   Resume daily PPI.   Monitor serial CBC and transfuse if patient becomes hemodynamically unstable, symptomatic or H/H drops below 7/21.    Primary insomnia  Resume home Elavil.  Home Lunesta substituted to Ambien due to formulary.       Chronic pain syndrome   reviewed; resume home [place]. [place] Contin.       Hypokalemia  Patient has hypokalemia which is Acute and currently uncontrolled. Most recent potassium levels reviewed-   Lab Results   Component Value Date    K 3.4 (L) 03/21/2024   . Will continue potassium replacement per protocol and recheck repeat levels after replacement completed.     Gastroesophageal reflux disease  Resume home PPI.       Essential (primary) hypertension  Chronic, uncontrolled. Latest blood pressure and vitals reviewed-     Temp:  [97 °F (36.1 °C)-99.4 °F (37.4 °C)]   Pulse:  [74-98]   Resp:  [13-20]   BP: (126-212)/()   SpO2:  [94 %-100 %] .   Home meds for hypertension were reviewed and noted below.   Hypertension Medications               fosinopriL (MONOPRIL) 20 MG tablet Take 1 tablet (20 mg total) by mouth once daily.    isosorbide mononitrate (IMDUR) 30 MG 24 hr tablet Take 1 tablet (30 mg total) by mouth once daily.    metoprolol succinate (TOPROL-XL) 50 MG 24 hr tablet Take 1 tablet (50 mg total) by mouth once daily.    nitroGLYCERIN (NITROSTAT) 0.4 MG SL tablet DISSOLVE 1 TABLET UNDER TONGUE FOR CHEST PAIN EVERY 5 MINUTES X 3 DOSES IF NO RELIEF GO TO EMERGENCY ROOM            While in the hospital, will manage blood pressure as follows; Continue home antihypertensive regimen    Will utilize p.r.n. blood pressure medication only if patient's blood pressure greater than 180/110  "and she develops symptoms such as worsening chest pain or shortness of breath.    Diabetic peripheral neuropathy  Resume home Lyrica 200 mg TID.     Peripheral artery disease  S/p left AKA, follows with Dr. García.  Hold home Eliquis given concern for bleed/anemia.       Atherosclerosis of native coronary artery of native heart without angina pectoris  Follows with Dr. Delarosa.  Patient with known CAD s/p stent placement, which is uncontrolled- she had short lasting episode of chest pain which resolved on its own.  EKG did not show any new ischemic changes, troponin x 2 negative.   Hold ASA, Plavix given anemia.   Will continue  metoprolol and Statin and monitor for S/Sx of angina/ACS.   Continue to monitor on telemetry.     Type 2 diabetes mellitus, without long-term current use of insulin  Patient's FSGs are controlled on current medication regimen.  Last A1c reviewed-   Lab Results   Component Value Date    HGBA1C 5.9 01/30/2024     Most recent fingerstick glucose reviewed- No results for input(s): "POCTGLUCOSE" in the last 24 hours.  Current correctional scale  Medium  Maintain anti-hyperglycemic dose as follows-   Antihyperglycemics (From admission, onward)      Start     Stop Route Frequency Ordered    03/20/24 2100  insulin detemir U-100 injection 20 Units         -- SubQ Nightly 03/20/24 1413    03/20/24 1511  insulin aspart U-100 injection 0-10 Units         -- SubQ Before meals & nightly PRN 03/20/24 1413          Hold Oral hypoglycemics while patient is in the hospital.        VTE Risk Mitigation (From admission, onward)           Ordered     IP VTE HIGH RISK PATIENT  Once         03/20/24 1413     Place sequential compression device  Until discontinued         03/20/24 1413                    Discharge Planning   ARABELLA: 3/22/2024     Code Status: Full Code   Is the patient medically ready for discharge?:     Reason for patient still in hospital (select all that apply): Laboratory test and Consult " recommendations  Discharge Plan A: Home                  SUYAPA BROWN MD  Department of Hospital Medicine   [facility] - [address] [place] [address]

## 2024-03-21 NOTE — HOSPITAL COURSE
3/21- s/p EGD which showed gastritis without bleeding. Plan for colonoscopy tomorrow. Hgb improved to 8.7.  3/22- Hgb remains stable. Colonoscopy with one polyp and no active bleeding. Discontinued aspirin and Eliquis per discussion with GI and vascular teams. Outpatient hematology referral and iron supplements on discharge.

## 2024-03-21 NOTE — ASSESSMENT & PLAN NOTE
Patient's anemia is currently uncontrolled. Has received 2 units of PRBCs on admission . Etiology likely d/t acute blood loss which was from possible GI bleed and Iron deficiency  Baseline Hgb ~ 10-11. Admission Hgb 6.0.  After 2 units, Hgb improved to 8.7.  Iron levels suggest severe deficiency, s/p IV iron.   Retic count elevated which is appropriate.   Current CBC reviewed-   Lab Results   Component Value Date    HGB 8.7 (L) 03/21/2024    HCT 32.4 (L) 03/21/2024       GI consulted for endoscopic evaluation as she is high risk for bleed given being on DAPT plus Eliquis- holding meds.  S/p EGD (3/21) which showed gastritis without bleeding.  Plan for colonoscopy tomorrow.   Resume daily PPI.   Monitor serial CBC and transfuse if patient becomes hemodynamically unstable, symptomatic or H/H drops below 7/21.

## 2024-03-22 ENCOUNTER — ANESTHESIA EVENT (OUTPATIENT)
Dept: GASTROENTEROLOGY | Facility: HOSPITAL | Age: 59
DRG: 378 | End: 2024-03-22
Payer: MEDICARE

## 2024-03-22 ENCOUNTER — ANESTHESIA (OUTPATIENT)
Dept: GASTROENTEROLOGY | Facility: HOSPITAL | Age: 59
DRG: 378 | End: 2024-03-22
Payer: MEDICARE

## 2024-03-22 VITALS
DIASTOLIC BLOOD PRESSURE: 77 MMHG | SYSTOLIC BLOOD PRESSURE: 154 MMHG | TEMPERATURE: 100 F | RESPIRATION RATE: 18 BRPM | WEIGHT: 190 LBS | HEART RATE: 83 BPM | HEIGHT: 67 IN | BODY MASS INDEX: 29.82 KG/M2 | OXYGEN SATURATION: 97 %

## 2024-03-22 PROBLEM — Z86.0100 HISTORY OF COLON POLYPS: Status: ACTIVE | Noted: 2024-03-22

## 2024-03-22 PROBLEM — Z86.010 HISTORY OF COLON POLYPS: Status: ACTIVE | Noted: 2024-03-22

## 2024-03-22 PROBLEM — D12.2 ADENOMATOUS POLYP OF ASCENDING COLON: Status: ACTIVE | Noted: 2024-03-22

## 2024-03-22 PROBLEM — K63.89 MELANOSIS COLI: Status: ACTIVE | Noted: 2024-03-22

## 2024-03-22 LAB
ANION GAP SERPL CALCULATED.3IONS-SCNC: 6 MMOL/L (ref 7–16)
ANISOCYTOSIS BLD QL SMEAR: ABNORMAL
BASOPHILS # BLD AUTO: 0.08 K/UL (ref 0–0.2)
BASOPHILS NFR BLD AUTO: 0.8 % (ref 0–1)
BUN SERPL-MCNC: 5 MG/DL (ref 7–18)
BUN/CREAT SERPL: 10 (ref 6–20)
CALCIUM SERPL-MCNC: 8.9 MG/DL (ref 8.5–10.1)
CHLORIDE SERPL-SCNC: 110 MMOL/L (ref 98–107)
CO2 SERPL-SCNC: 31 MMOL/L (ref 21–32)
CREAT SERPL-MCNC: 0.51 MG/DL (ref 0.55–1.02)
DIFFERENTIAL METHOD BLD: ABNORMAL
EGFR (NO RACE VARIABLE) (RUSH/TITUS): 108 ML/MIN/1.73M2
EOSINOPHIL # BLD AUTO: 0.32 K/UL (ref 0–0.5)
EOSINOPHIL NFR BLD AUTO: 3.3 % (ref 1–4)
ERYTHROCYTE [DISTWIDTH] IN BLOOD BY AUTOMATED COUNT: 22.5 % (ref 11.5–14.5)
ESTROGEN SERPL-MCNC: NORMAL PG/ML
FOLDED CELLS: ABNORMAL
GLUCOSE SERPL-MCNC: 101 MG/DL (ref 70–105)
GLUCOSE SERPL-MCNC: 103 MG/DL (ref 70–105)
GLUCOSE SERPL-MCNC: 106 MG/DL (ref 70–105)
GLUCOSE SERPL-MCNC: 140 MG/DL (ref 70–105)
GLUCOSE SERPL-MCNC: 86 MG/DL (ref 74–106)
HCT VFR BLD AUTO: 29.6 % (ref 38–47)
HGB BLD-MCNC: 8.5 G/DL (ref 12–16)
HYPOCHROMIA BLD QL SMEAR: ABNORMAL
IMM GRANULOCYTES # BLD AUTO: 0.04 K/UL (ref 0–0.04)
IMM GRANULOCYTES NFR BLD: 0.4 % (ref 0–0.4)
INSULIN SERPL-ACNC: NORMAL U[IU]/ML
LAB AP GROSS DESCRIPTION: NORMAL
LAB AP LABORATORY NOTES: NORMAL
LYMPHOCYTES # BLD AUTO: 2.46 K/UL (ref 1–4.8)
LYMPHOCYTES NFR BLD AUTO: 25 % (ref 27–41)
MCH RBC QN AUTO: 20.4 PG (ref 27–31)
MCHC RBC AUTO-ENTMCNC: 28.7 G/DL (ref 32–36)
MCV RBC AUTO: 71.2 FL (ref 80–96)
MICROCYTES BLD QL SMEAR: ABNORMAL
MONOCYTES # BLD AUTO: 1.06 K/UL (ref 0–0.8)
MONOCYTES NFR BLD AUTO: 10.8 % (ref 2–6)
MPC BLD CALC-MCNC: 10.8 FL (ref 9.4–12.4)
NEUTROPHILS # BLD AUTO: 5.87 K/UL (ref 1.8–7.7)
NEUTROPHILS NFR BLD AUTO: 59.7 % (ref 53–65)
NRBC # BLD AUTO: 0.02 X10E3/UL
NRBC, AUTO (.00): 0.2 %
OVALOCYTES BLD QL SMEAR: ABNORMAL
PLATELET # BLD AUTO: 343 K/UL (ref 150–400)
PLATELET MORPHOLOGY: ABNORMAL
POLYCHROMASIA BLD QL SMEAR: ABNORMAL
POTASSIUM SERPL-SCNC: 3.1 MMOL/L (ref 3.5–5.1)
RBC # BLD AUTO: 4.16 M/UL (ref 4.2–5.4)
SODIUM SERPL-SCNC: 144 MMOL/L (ref 136–145)
STOMATOCYTES BLD QL SMEAR: ABNORMAL
T3RU NFR SERPL: NORMAL %
TARGETS BLD QL SMEAR: ABNORMAL
WBC # BLD AUTO: 9.83 K/UL (ref 4.5–11)

## 2024-03-22 PROCEDURE — 25000003 PHARM REV CODE 250: Performed by: NURSE ANESTHETIST, CERTIFIED REGISTERED

## 2024-03-22 PROCEDURE — 0DBK8ZX EXCISION OF ASCENDING COLON, VIA NATURAL OR ARTIFICIAL OPENING ENDOSCOPIC, DIAGNOSTIC: ICD-10-PCS | Performed by: INTERNAL MEDICINE

## 2024-03-22 PROCEDURE — D9220A PRA ANESTHESIA: Mod: CRNA,,, | Performed by: NURSE ANESTHETIST, CERTIFIED REGISTERED

## 2024-03-22 PROCEDURE — 88305 TISSUE EXAM BY PATHOLOGIST: CPT | Mod: 26,,, | Performed by: PATHOLOGY

## 2024-03-22 PROCEDURE — 25000003 PHARM REV CODE 250: Performed by: INTERNAL MEDICINE

## 2024-03-22 PROCEDURE — 80048 BASIC METABOLIC PNL TOTAL CA: CPT | Performed by: INTERNAL MEDICINE

## 2024-03-22 PROCEDURE — 82962 GLUCOSE BLOOD TEST: CPT

## 2024-03-22 PROCEDURE — 85025 COMPLETE CBC W/AUTO DIFF WBC: CPT | Performed by: INTERNAL MEDICINE

## 2024-03-22 PROCEDURE — 45385 COLONOSCOPY W/LESION REMOVAL: CPT | Mod: ,,, | Performed by: INTERNAL MEDICINE

## 2024-03-22 PROCEDURE — 63600175 PHARM REV CODE 636 W HCPCS: Performed by: NURSE ANESTHETIST, CERTIFIED REGISTERED

## 2024-03-22 PROCEDURE — 45385 COLONOSCOPY W/LESION REMOVAL: CPT | Performed by: INTERNAL MEDICINE

## 2024-03-22 PROCEDURE — D9220A PRA ANESTHESIA: Mod: ANES,,, | Performed by: ANESTHESIOLOGY

## 2024-03-22 PROCEDURE — 99239 HOSP IP/OBS DSCHRG MGMT >30: CPT | Mod: ,,, | Performed by: INTERNAL MEDICINE

## 2024-03-22 PROCEDURE — 88305 TISSUE EXAM BY PATHOLOGIST: CPT | Mod: TC,SUR | Performed by: INTERNAL MEDICINE

## 2024-03-22 RX ORDER — FERROUS SULFATE 325(65) MG
325 TABLET, DELAYED RELEASE (ENTERIC COATED) ORAL EVERY OTHER DAY
Qty: 15 TABLET | Refills: 2 | Status: SHIPPED | OUTPATIENT
Start: 2024-03-22 | End: 2024-04-30 | Stop reason: SDUPTHER

## 2024-03-22 RX ORDER — PROPOFOL 10 MG/ML
VIAL (ML) INTRAVENOUS
Status: DISCONTINUED | OUTPATIENT
Start: 2024-03-22 | End: 2024-03-22

## 2024-03-22 RX ORDER — SODIUM CHLORIDE 0.9 % (FLUSH) 0.9 %
10 SYRINGE (ML) INJECTION
Status: DISCONTINUED | OUTPATIENT
Start: 2024-03-22 | End: 2024-03-22 | Stop reason: HOSPADM

## 2024-03-22 RX ORDER — LIDOCAINE HYDROCHLORIDE 20 MG/ML
INJECTION, SOLUTION EPIDURAL; INFILTRATION; INTRACAUDAL; PERINEURAL
Status: DISCONTINUED | OUTPATIENT
Start: 2024-03-22 | End: 2024-03-22

## 2024-03-22 RX ADMIN — PROPOFOL 25 MG: 10 INJECTION, EMULSION INTRAVENOUS at 12:03

## 2024-03-22 RX ADMIN — LISINOPRIL 20 MG: 20 TABLET ORAL at 08:03

## 2024-03-22 RX ADMIN — ATORVASTATIN CALCIUM 80 MG: 80 TABLET, FILM COATED ORAL at 08:03

## 2024-03-22 RX ADMIN — METOPROLOL SUCCINATE 50 MG: 50 TABLET, EXTENDED RELEASE ORAL at 08:03

## 2024-03-22 RX ADMIN — DOCUSATE SODIUM 50 MG: 50 LIQUID ORAL at 08:03

## 2024-03-22 RX ADMIN — PREGABALIN 200 MG: 75 CAPSULE ORAL at 08:03

## 2024-03-22 RX ADMIN — LIDOCAINE HYDROCHLORIDE 25 MG: 20 INJECTION, SOLUTION INTRAVENOUS at 12:03

## 2024-03-22 RX ADMIN — HYPROMELLOSE 2910 2 DROP: 5 SOLUTION/ DROPS OPHTHALMIC at 08:03

## 2024-03-22 RX ADMIN — PREGABALIN 200 MG: 75 CAPSULE ORAL at 04:03

## 2024-03-22 RX ADMIN — PROPOFOL 25 MG: 10 INJECTION, EMULSION INTRAVENOUS at 01:03

## 2024-03-22 RX ADMIN — ISOSORBIDE MONONITRATE 30 MG: 30 TABLET, EXTENDED RELEASE ORAL at 08:03

## 2024-03-22 RX ADMIN — AMITRIPTYLINE HYDROCHLORIDE 25 MG: 25 TABLET, FILM COATED ORAL at 08:03

## 2024-03-22 RX ADMIN — SODIUM CHLORIDE: 9 INJECTION, SOLUTION INTRAVENOUS at 12:03

## 2024-03-22 RX ADMIN — PANTOPRAZOLE SODIUM 40 MG: 40 TABLET, DELAYED RELEASE ORAL at 08:03

## 2024-03-22 NOTE — DISCHARGE SUMMARY
"[facility] Hospital Medicine  Discharge Summary      Patient Name: Anuradha Godfrey  MRN: [id number]  KP: [id number]  Patient Class: IP- Inpatient  Admission Date: 3/20/2024  Hospital Length of Stay: 2 days  Discharge Date and Time:  03/22/2024 2:38 PM  Attending Physician: Colten Paula MD   Discharging Provider: COLTEN PAULA MD  Primary Care Provider: Munir Garcia MD    Primary Care Team: Networked reference to record PCT     HPI:   Ms. Godfrey is a 58 Y O female with PMH of CAD s/p PCI, PAD s/p left AKA, DM type II, HTN, chronic pain syndrome who presented with abnormal lab. Patient has been having fatigue, excessive tiredness and lethargy over the last 2-3 weeks. She denies melena, hematochezia, hematuria, vaginal bleeding. She had her routine lab work done on 3/11 and she went on "My Chart" to look for results and saw that her Hgb was extremely low. She called her cardiologist office and was told to come to the ED. She also reports having short lasting (10-12 min) episode of central chest heaviness radiating to her shoulder blades without associated shortness of breath, diaphoresis, nausea. She has undergone EGD in 2022 for dysphagia and had dilation done and she also had a colonoscopy in 2018 which showed a single non-cancerous polyp. She is on Aspirin, Plavix and Eliquis at home for her CAD and PAD. She denies using more than 2-3 tabs of Ibuprofen in a month.     * No surgery found *      Hospital Course:   3/21- s/p EGD which showed gastritis without bleeding. Plan for colonoscopy tomorrow. Hgb improved to 8.7.  3/22- Hgb remains stable. Colonoscopy with one polyp and no active bleeding. Discontinued aspirin and Eliquis per discussion with GI and vascular teams. Outpatient hematology referral and iron supplements on discharge.      Goals of Care Treatment Preferences:  Code Status: Full Code      Consults:   Consults (From admission, onward)          " Status Ordering Provider     Inpatient consult to Gastroenterology  Once        Provider:  Ronald Benitez MD    Completed SUYAPA BROWN            Cardiac/Vascular  Peripheral artery disease  S/p left AKA, follows with Dr. García.  Discussed with vascular surgery NP- ok to discontinue Eliquis       Atherosclerosis of native coronary artery of native heart without angina pectoris  Follows with Dr. Delarosa.  Patient with known CAD s/p stent placement, which is uncontrolled- she had short lasting episode of chest pain which resolved on its own.  EKG did not show any new ischemic changes, troponin x 2 negative.   Resume Plavix. DC aspirin. .  Will continue  metoprolol and Statin and monitor for S/Sx of angina/ACS.   Continue to monitor on telemetry.     Oncology  * Symptomatic anemia  Patient's anemia is currently uncontrolled. Has received 2 units of PRBCs on admission . Etiology likely d/t acute blood loss which was from possible GI bleed and Iron deficiency  Baseline Hgb ~ 10-11. Admission Hgb 6.0.  After 2 units, Hgb improved to 8.7.  Iron levels suggest severe deficiency, s/p IV iron.   Retic count elevated which is appropriate.   Current CBC reviewed-   Lab Results   Component Value Date    HGB 8.5 (L) 03/22/2024    HCT 29.6 (L) 03/22/2024       GI consulted for endoscopic evaluation as she is high risk for bleed given being on DAPT plus Eliquis- holding meds.  S/p EGD (3/21) which showed gastritis without bleeding.  S/p colonoscopy (3/22) which showed single polyp s/p removal.   Per GI ok to resume Plavix, discussed with vascular team who started Eliquis and they have said to discontinue it, will be holding aspirin as well.  Resume daily PPI.   Monitor serial CBC and transfuse if patient becomes hemodynamically unstable, symptomatic or H/H drops below 7/21.    Endocrine  Type 2 diabetes mellitus, without long-term current use of insulin  Patient's FSGs are controlled on current medication regimen.  Last  "A1c reviewed-   Lab Results   Component Value Date    HGBA1C 5.9 01/30/2024     Most recent fingerstick glucose reviewed- No results for input(s): "POCTGLUCOSE" in the last 24 hours.  Current correctional scale  Medium  Maintain anti-hyperglycemic dose as follows-   Antihyperglycemics (From admission, onward)      Start     Stop Route Frequency Ordered    03/20/24 2100  insulin detemir U-100 injection 20 Units         -- SubQ Nightly 03/20/24 1413    03/20/24 1511  insulin aspart U-100 injection 0-10 Units         -- SubQ Before meals & nightly PRN 03/20/24 1413          Hold Oral hypoglycemics while patient is in the hospital.        Final Active Diagnoses:    Diagnosis Date Noted POA    PRINCIPAL PROBLEM:  Symptomatic anemia [D64.9] 03/20/2024 Yes    History of colon polyps [Z86.010] 03/22/2024 Not Applicable    Adenomatous polyp of ascending colon [D12.2] 03/22/2024 Yes    Hypokalemia [E87.6] 03/20/2024 Yes    Chronic pain syndrome [G89.4] 03/20/2024 Yes    Primary insomnia [F51.01] 03/20/2024 Yes    Gastroesophageal reflux disease [K21.9] 06/26/2023 Yes    Essential (primary) hypertension [I10] 11/17/2022 Yes    Diabetic peripheral neuropathy [E11.42] 09/24/2021 Yes    Peripheral artery disease [I73.9] 05/05/2021 Yes    Type 2 diabetes mellitus, without long-term current use of insulin [E11.9] 04/13/2021 Yes    Atherosclerosis of native coronary artery of native heart without angina pectoris [I25.10] 04/13/2021 Yes      Problems Resolved During this Admission:       Discharged Condition: fair    Disposition: Home or Self Care    Follow Up:   Follow-up Information       Munir Garcia MD. Schedule an appointment as soon as possible for a visit in 1 week(s).    Specialties: Internal Medicine, Family Medicine, Hospitalist  Contact information:  [address] [place] [address]  [phone]               Anne-Marie Pitts MD. Schedule an appointment as soon as possible for a visit in 2 week(s).  "   Specialty: Hematology and Oncology  Contact information:  [address]  [facility] [address]  [phone]               Ronald Benitez MD. Schedule an appointment as soon as possible for a visit in 2 week(s).    Specialty: Gastroenterology  Contact information:  [address]  [facility] [place] [address]  [phone]                           Patient Instructions:      CBC Auto Differential   Standing Status: Future Standing Exp. Date: 06/20/25   Order Comments: Fax results to Dr. Munir Garcia.     Ambulatory referral/consult to Hematology / Oncology   Standing Status: Future   Referral Priority: Routine Referral Type: Consultation   Referral Reason: Specialty Services Required   Requested Specialty: Hematology and Oncology   Number of Visits Requested: 1       Significant Diagnostic Studies: Labs: CBC   Recent Labs   Lab 03/21/24  0749 03/21/24  1421 03/22/24  0256   WBC 9.61  --  9.83   HGB 8.7* 8.9* 8.5*   HCT 32.4* 30.6* 29.6*     --  343       Pending Diagnostic Studies:       Procedure Component Value Units Date/Time    Urinalysis, Reflex to Urine Culture [[id number]] Collected: 03/20/24 1813    Order Status: Sent Lab Status: No result     Specimen: Urine, Clean Catch            Medications:  Reconciled Home Medications:      Medication List        START taking these medications      ferrous sulfate 325 (65 FE) MG EC tablet  Take 1 tablet (325 mg total) by mouth every other day.            CONTINUE taking these medications      acetaminophen 500 MG tablet  Commonly known as: TYLENOL  Take 1,000 mg by mouth every 6 (six) hours as needed for Pain.     * amitriptyline 25 MG tablet  Commonly known as: ELAVIL  Take 1 tablet (25 mg total) by mouth once daily.     * amitriptyline 50 MG tablet  Commonly known as: ELAVIL  Take 1 tablet (50 mg total) by mouth every evening.     atorvastatin 80 MG tablet  Commonly known as: LIPITOR  Take 1 tablet (80 mg total) by mouth once  daily.     blood sugar diagnostic Strp  1 strip by Misc.(Non-Drug; Combo Route) route 3 (three) times daily.     clopidogreL 75 mg tablet  Commonly known as: PLAVIX  Take 1 tablet (75 mg total) by mouth once daily.     cycloSPORINE 0.05 % ophthalmic emulsion  Commonly known as: RESTASIS  PLACE 1 DROP INTO BOTH EYES TWICE DAILY     diclofenac sodium 1 % Gel  Commonly known as: VOLTAREN  Apply topically.     docusate sodium 50 MG capsule  Commonly known as: COLACE  Take 50 mg by mouth once daily.     ELIQUIS 2.5 mg Tab  Generic drug: apixaban  Take 1 tablet (2.5 mg total) by mouth 2 (two) times daily.     esomeprazole 40 MG capsule  Commonly known as: NEXIUM  TAKE 1 CAPSULE BY MOUTH EACH MORNING BEFORE BREAKFAST     eszopiclone 3 mg Tab  Commonly known as: LUNESTA  TAKE 1 TABLET BY MOUTH EVERY NIGHT AT BEDTIME AS NEEDED FOR SLEEP *AVOID ALCOHOL *CAUSES DROWSINESS*     fosinopriL 20 MG tablet  Commonly known as: MONOPRIL  Take 1 tablet (20 mg total) by mouth once daily.     glimepiride 2 MG tablet  Commonly known as: AMARYL  Take 1 tablet (2 mg total) by mouth daily with breakfast.     HYDROcodone-acetaminophen  mg per tablet  Commonly known as: NORCO  TAKE 1 TABLET BY MOUTH 4 TIMES DAILY AS NEEDED ..MAY CAUSE DROWSINESS- AVOID ALCOHOL     isosorbide mononitrate 30 MG 24 hr tablet  Commonly known as: IMDUR  Take 1 tablet (30 mg total) by mouth once daily.     LANTUS SOLOSTAR U-100 INSULIN glargine 100 units/mL SubQ pen  Generic drug: insulin  Inject 35 Units into the skin every evening.     methocarbamoL 750 MG Tab  Commonly known as: ROBAXIN  TAKE 1 TABLET BY MOUTH 3 TIMES DAILY WITH FOOD AS NEEDED ..MAY CAUSE DROWSINESS- AVOID ALCOHOL     metoprolol succinate 50 MG 24 hr tablet  Commonly known as: TOPROL-XL  Take 1 tablet (50 mg total) by mouth once daily.     morphine 15 MG 12 hr tablet  Commonly known as: MS CONTIN  TAKE 1 TABLET BY MOUTH EACH NIGHT AT BEDTIME ..MAY CAUSE DROWSINESS- AVOID ALCOHOL      nitroGLYCERIN 0.4 MG SL tablet  Commonly known as: NITROSTAT  DISSOLVE 1 TABLET UNDER TONGUE FOR CHEST PAIN EVERY 5 MINUTES X 3 DOSES IF NO RELIEF GO TO EMERGENCY ROOM     NovoLOG Mix 70-30FlexPen U-100 100 unit/mL (70-30) Inpn pen  Generic drug: insulin aspart protamine-insulin aspart  Inject 20 Units into the skin 2 (two) times a day.     pioglitazone 15 MG tablet  Commonly known as: ACTOS  Take 1 tablet (15 mg total) by mouth once daily.     pregabalin 200 MG Cap  Commonly known as: LYRICA  TAKE 1 CAPSULE BY MOUTH 3 TIMES DAILY ..MAY CAUSE DROWSINESS- AVOID ALCOHOL     promethazine 50 MG tablet  Commonly known as: PHENERGAN  Take 1 tablet (50 mg total) by mouth every 4 to 6 hours as needed for Nausea.     UBRELVY 100 mg tablet  Generic drug: ubrogepant  Take 1 tablet (100 mg total) by mouth as needed for Migraine. If symptoms persist or return, may repeat dose after 2 hours. Maximum: 200 mg per 24 hours           * This list has 2 medication(s) that are the same as other medications prescribed for you. Read the directions carefully, and ask your doctor or other care provider to review them with you.                STOP taking these medications      aspirin 81 MG EC tablet  Commonly known as: ECOTRIN     dexlansoprazole 60 mg capsule  Commonly known as: DEXILANT     ezetimibe 10 mg tablet  Commonly known as: ZETIA     fluticasone propionate 50 mcg/actuation nasal spray  Commonly known as: FLONASE              Indwelling Lines/Drains at time of discharge:   Lines/Drains/Airways       None                   Time spent on the discharge of patient: 45 minutes         SUYAPA BROWN MD  Department of Hospital Medicine  [facility]

## 2024-03-22 NOTE — PROGRESS NOTES
EGD biopsies show gastritis without H.pylori.  Recommend: avoid nsaids; continue PPI (protonix) for at least 8 weeks.

## 2024-03-22 NOTE — NURSING
Skin warm et dry.  Resp even et unlabored.  Alert, verbally responsive, et oriented.  Denies pain or discomfort.  Discharge instructions reviewed and written copy provided.  Meds to be delivered from [facility].  Denies add'l questions or needs at this time.  Awaiting transport for d/c home.  No s/s of acute distress noted.  Safety measures in place et maintained.

## 2024-03-22 NOTE — H&P
[facility]  Gastroenterology  H&P    Patient Name: Anuradha Godfrey  MRN: [id number]  Admission Date: 3/20/2024  Code Status: Full Code    Attending Provider: Colten Paula MD   Primary Care Physician: Munir Garcia MD  Principal Problem:Symptomatic anemia    Subjective:     History of Present Illness: Pt has iron deficiency anemia with history of a ta colon polyp at last colonoscopy in 2018.    Past Medical History:   Diagnosis Date    AAA (abdominal aortic aneurysm) 08/18/2014    Acute superficial gastritis without hemorrhage 01/04/2022    Anemia 05/16/2018    Anxiety state 07/21/2015    Celiac disease 02/29/2016    Chest pain 08/05/2014    Coronary arteriosclerosis 12/18/2018    Depressive disorder 08/18/2014    Diabetes mellitus     Diastolic dysfunction 08/14/2018    Embolism and thrombosis of arteries of extremities 10/15/2015    Gastroesophageal reflux disease without esophagitis 07/18/2017    History of myocardial infarction 07/13/2014    Hypercholesterolemia 01/18/2016    Hypertension 07/15/2020    Insufficiency, arterial, peripheral 08/01/2019    Multi-vessel coronary artery stenosis 08/01/2019    Myocardial infarct 2015    pt states she has had 5 total MI's    Occlusion and stenosis of unspecified carotid artery 07/26/2019    Peripheral arterial occlusive disease 12/15/2016    Peripheral vascular disease 11/12/2020    Venous insufficiency (chronic) (peripheral) 10/15/2015       Past Surgical History:   Procedure Laterality Date    APPENDECTOMY Right     CARDIAC CATHETERIZATION  02/04/2021    PCI to prox LAD; IVUS of LAD    CHOLECYSTECTOMY      OOPHORECTOMY      TOTAL ABDOMINAL HYSTERECTOMY         Review of patient's allergies indicates:   Allergen Reactions    Bee sting [allergen ext-venom-honey bee] Anaphylaxis    Nsaids (non-steroidal anti-inflammatory drug) Anaphylaxis    Grass pollen-alyssa, standard     Neurontin [gabapentin] Hallucinations     Topiramate      Other reaction(s): Unknown     Family History       Problem Relation (Age of Onset)    Heart disease Mother, Brother    Stroke Mother          Tobacco Use    Smoking status: Never    Smokeless tobacco: Never   Substance and Sexual Activity    Alcohol use: Not Currently    Drug use: Never    Sexual activity: Not Currently     Review of Systems   Respiratory: Negative.     Cardiovascular: Negative.    Gastrointestinal: Negative.    Musculoskeletal:  Positive for gait problem.     Objective:     Vital Signs (Most Recent):  Temp: 99 °F (37.2 °C) (03/22/24 1000)  Pulse: 86 (03/22/24 1225)  Resp: 16 (03/22/24 1225)  BP: (!) 147/55 (03/22/24 1225)  SpO2: 97 % (03/22/24 1225) Vital Signs (24h Range):  Temp:  [97.7 °F (36.5 °C)-99 °F (37.2 °C)] 99 °F (37.2 °C)  Pulse:  [80-91] 86  Resp:  [16-18] 16  SpO2:  [95 %-99 %] 97 %  BP: (137-175)/(55-90) 147/55     Weight: 86.2 kg (190 lb) (03/21/24 0927)  Body mass index is 29.76 kg/m².    No intake or output data in the 24 hours ending 03/22/24 1244    Lines/Drains/Airways       Peripheral Intravenous Line  Duration                  Peripheral IV - Single Lumen 03/20/24 1109 20 G Posterior;Right Wrist 2 days                    Physical Exam  Vitals reviewed.   Constitutional:       General: She is not in acute distress.     Appearance: Normal appearance. She is well-developed. She is not ill-appearing.   HENT:      Head: Normocephalic and atraumatic.      Nose: Nose normal.   Eyes:      Pupils: Pupils are equal, round, and reactive to light.   Cardiovascular:      Rate and Rhythm: Normal rate and regular rhythm.   Pulmonary:      Effort: Pulmonary effort is normal.      Breath sounds: Normal breath sounds. No wheezing.   Abdominal:      General: Abdomen is flat. Bowel sounds are normal. There is no distension.      Palpations: Abdomen is soft.      Tenderness: There is no abdominal tenderness. There is no guarding.   Musculoskeletal:      Comments: Left aka    Skin:     General: Skin is warm and dry.      Coloration: Skin is not jaundiced.   Neurological:      Mental Status: She is alert.   Psychiatric:         Attention and Perception: Attention normal.         Mood and Affect: Affect normal.         Speech: Speech normal.         Behavior: Behavior is cooperative.      Comments: Pt was calm while speaking.         Significant Labs:  CBC:   Recent Labs   Lab 03/21/24  0749 03/21/24  1421 03/22/24  0256   WBC 9.61  --  9.83   HGB 8.7* 8.9* 8.5*   HCT 32.4* 30.6* 29.6*     --  343     CMP:   Recent Labs   Lab 03/22/24  0256   GLU 86   CALCIUM 8.9      K 3.1*   CO2 31   *   BUN 5*   CREATININE 0.51*       Significant Imaging:  Imaging results within the past 24 hours have been reviewed.    Assessment/Plan:     Active Diagnoses:    Diagnosis Date Noted POA    PRINCIPAL PROBLEM:  Symptomatic anemia [D64.9] 03/20/2024 Yes    Hypokalemia [E87.6] 03/20/2024 Yes    Chronic pain syndrome [G89.4] 03/20/2024 Yes    Primary insomnia [F51.01] 03/20/2024 Yes    Gastroesophageal reflux disease [K21.9] 06/26/2023 Yes    Essential (primary) hypertension [I10] 11/17/2022 Yes    Diabetic peripheral neuropathy [E11.42] 09/24/2021 Yes    Peripheral artery disease [I73.9] 05/05/2021 Yes    Type 2 diabetes mellitus, without long-term current use of insulin [E11.9] 04/13/2021 Yes    Atherosclerosis of native coronary artery of native heart without angina pectoris [I25.10] 04/13/2021 Yes      Problems Resolved During this Admission:       Imp: iron deficiency anemia, history of colon polyps  Plan: colonoscopy    Ronald Benitez MD  Gastroenterology  [facility]

## 2024-03-22 NOTE — ANESTHESIA POSTPROCEDURE EVALUATION
Anesthesia Post Evaluation    Patient: Anuradha Godfrey    Procedure(s) Performed: * No procedures listed *    Final Anesthesia Type: general      Patient location during evaluation: PACU  Patient participation: Yes- Able to Participate  Level of consciousness: awake and sedated  Post-procedure vital signs: reviewed and stable  Pain management: adequate  Airway patency: patent    PONV status at discharge: No PONV  Anesthetic complications: no      Cardiovascular status: blood pressure returned to baseline  Respiratory status: unassisted  Hydration status: euvolemic  Follow-up not needed.              Vitals Value Taken Time   /55 03/22/24 1323   Temp 36.4 °C (97.6 °F) 03/22/24 1309   Pulse 81 03/22/24 1323   Resp 16 03/22/24 1323   SpO2 94 % 03/22/24 1323   Vitals shown include unvalidated device data.      No case tracking events are documented in the log.      Pain/Ben Score: Pain Rating Prior to Med Admin: 6 (3/21/2024  9:50 PM)  Pain Rating Post Med Admin: 0 (3/21/2024  9:50 PM)  Ben Score: 9 (3/22/2024  1:18 PM)

## 2024-03-22 NOTE — ASSESSMENT & PLAN NOTE
S/p left AKA, follows with Dr. García.  Discussed with vascular surgery NP- ok to discontinue Eliquis

## 2024-03-22 NOTE — TRANSFER OF CARE
"Anesthesia Transfer of Care Note    Patient: Anuradha Godfrey    Procedure(s) Performed: * No procedures listed *    Patient location: GI    Anesthesia Type: general    Transport from OR: Transported from OR on room air with adequate spontaneous ventilation    Post pain: adequate analgesia    Post assessment: no apparent anesthetic complications    Post vital signs: stable    Level of consciousness: responds to stimulation    Nausea/Vomiting: no nausea/vomiting    Complications: none    Transfer of care protocol was followed      Last vitals: Visit Vitals  BP (!) 112/50 (BP Location: Left arm, Patient Position: Lying)   Pulse 79   Temp 36.4 °C (97.6 °F) (Oral)   Resp 15   Ht 5' 7" (1.702 m)   Wt 86.2 kg (190 lb)   SpO2 98%   Breastfeeding No   BMI 29.76 kg/m²     "

## 2024-03-22 NOTE — ASSESSMENT & PLAN NOTE
Follows with Dr. Delarosa.  Patient with known CAD s/p stent placement, which is uncontrolled- she had short lasting episode of chest pain which resolved on its own.  EKG did not show any new ischemic changes, troponin x 2 negative.   Resume Plavix/Eliquis at home, stopping aspirin.  Will continue  metoprolol and Statin and monitor for S/Sx of angina/ACS.   Continue to monitor on telemetry.

## 2024-03-22 NOTE — ASSESSMENT & PLAN NOTE
Patient's anemia is currently uncontrolled. Has received 2 units of PRBCs on admission . Etiology likely d/t acute blood loss which was from possible GI bleed and Iron deficiency  Baseline Hgb ~ 10-11. Admission Hgb 6.0.  After 2 units, Hgb improved to 8.7.  Iron levels suggest severe deficiency, s/p IV iron.   Retic count elevated which is appropriate.   Current CBC reviewed-   Lab Results   Component Value Date    HGB 8.5 (L) 03/22/2024    HCT 29.6 (L) 03/22/2024       GI consulted for endoscopic evaluation as she is high risk for bleed given being on DAPT plus Eliquis- holding meds.  S/p EGD (3/21) which showed gastritis without bleeding.  S/p colonoscopy (3/22) which showed single polyp s/p removal.   Per GI ok to resume Plavix/Eliquis at home, stopping aspirin.  Resume daily PPI.   Monitor serial CBC and transfuse if patient becomes hemodynamically unstable, symptomatic or H/H drops below 7/21.

## 2024-03-22 NOTE — ANESTHESIA PREPROCEDURE EVALUATION
03/22/2024  Anuradha Godfrey is a 58 y.o., female.      Pre-op Assessment    I have reviewed the Patient Summary Reports.     I have reviewed the Nursing Notes. I have reviewed the NPO Status.   I have reviewed the Medications.     Review of Systems  Anesthesia Hx:  No problems with previous Anesthesia                Social:  Non-Smoker, No Alcohol Use       Hematology/Oncology:    Oncology Normal    -- Anemia:                                  EENT/Dental:  EENT/Dental Normal           Cardiovascular:     Hypertension  Past MI CAD          PVD hyperlipidemia    PAD - leg amputation                         Pulmonary:  Pulmonary Normal                       Renal/:  Renal/ Normal                 Hepatic/GI:     GERD             Musculoskeletal:  Musculoskeletal Normal                Neurological:      Headaches           Chronic Pain Syndrome                         Endocrine:  Diabetes, well controlled, type 2 Hypothyroidism          Dermatological:  Skin Normal    Psych:  Psychiatric Normal                    Physical Exam  General: Well nourished    Airway:  Mallampati: III / III  Mouth Opening: Normal  TM Distance: > 6 cm  Tongue: Normal  Neck ROM: Normal ROM    Chest/Lungs:  Clear to auscultation, Normal Respiratory Rate    Heart:  Rate: Normal  Rhythm: Regular Rhythm        Anesthesia Plan  Type of Anesthesia, risks & benefits discussed:    Anesthesia Type: Gen Natural Airway  Intra-op Monitoring Plan: Standard ASA Monitors  Post Op Pain Control Plan: multimodal analgesia  Induction:  IV  Informed Consent: Informed consent signed with the Patient and all parties understand the risks and agree with anesthesia plan.  All questions answered. Patient consented to blood products? Yes  ASA Score: 3  Day of Surgery Review of History & Physical: H&P Update referred to the surgeon/provider.I have interviewed  and examined the patient. I have reviewed the patient's H&P dated: There are no significant changes. H&P completed by Anesthesiologist.    Ready For Surgery From Anesthesia Perspective.     .

## 2024-03-22 NOTE — ASSESSMENT & PLAN NOTE
Chronic, uncontrolled. Latest blood pressure and vitals reviewed-     Temp:  [97.6 °F (36.4 °C)-99.6 °F (37.6 °C)]   Pulse:  [79-91]   Resp:  [13-18]   BP: (104-175)/(47-90)   SpO2:  [93 %-99 %] .   Home meds for hypertension were reviewed and noted below.   Hypertension Medications               fosinopriL (MONOPRIL) 20 MG tablet Take 1 tablet (20 mg total) by mouth once daily.    isosorbide mononitrate (IMDUR) 30 MG 24 hr tablet Take 1 tablet (30 mg total) by mouth once daily.    metoprolol succinate (TOPROL-XL) 50 MG 24 hr tablet Take 1 tablet (50 mg total) by mouth once daily.    nitroGLYCERIN (NITROSTAT) 0.4 MG SL tablet DISSOLVE 1 TABLET UNDER TONGUE FOR CHEST PAIN EVERY 5 MINUTES X 3 DOSES IF NO RELIEF GO TO EMERGENCY ROOM            While in the hospital, will manage blood pressure as follows; Continue home antihypertensive regimen    Will utilize p.r.n. blood pressure medication only if patient's blood pressure greater than 180/110 and she develops symptoms such as worsening chest pain or shortness of breath.

## 2024-03-22 NOTE — DISCHARGE INSTRUCTIONS
Procedure Date  3/22/24     Impression  Overall Impression:   Polyp in the ascending colon was removed with hot snare  One small polyp was removed from the proximal ascending colon with hot snare polypectomy. Diffuse melanosis coli is present. Some retained stool was present. Inflamed hemorrhoids were noted without bleeding.     Recommendation    Await pathology results     Repeat colonoscopy in 5 years      Discharge: disp: DC to hospital room in stable condition. Resume diet and iron supplement. Follow-up in GI clinic and consider small bowel pill camera enteroscopy if iron deficiency persists.  Dx: iron deficiency anemia, history of colon polyps, melanosis coli, one polyp was removed during this exam    No driving today, no operating heavy machinery, no signing any legal documents until tomorrow.    Drink lots of fluids, resume regular diet.  Take your normal medications.     Please call our office for any nausea, vomiting or abdominal pain.

## 2024-03-22 NOTE — ASSESSMENT & PLAN NOTE
Follows with Dr. Delarosa.  Patient with known CAD s/p stent placement, which is uncontrolled- she had short lasting episode of chest pain which resolved on its own.  EKG did not show any new ischemic changes, troponin x 2 negative.   Resume Plavix. DC aspirin. .  Will continue  metoprolol and Statin and monitor for S/Sx of angina/ACS.   Continue to monitor on telemetry.

## 2024-03-22 NOTE — PLAN OF CARE
Problem: Adult Inpatient Plan of Care  Goal: Plan of Care Review  Outcome: Ongoing, Progressing  Goal: Patient-Specific Goal (Individualized)  Outcome: Ongoing, Progressing  Goal: Absence of Hospital-Acquired Illness or Injury  Outcome: Ongoing, Progressing  Goal: Optimal Comfort and Wellbeing  Outcome: Ongoing, Progressing     Problem: Diabetes Comorbidity  Goal: Blood Glucose Level Within Targeted Range  Outcome: Ongoing, Progressing     Problem: Bariatric Environmental Safety  Goal: Safety Maintained with Care  Outcome: Ongoing, Progressing     Problem: Skin Injury Risk Increased  Goal: Skin Health and Integrity  Outcome: Ongoing, Progressing     Problem: Fall Injury Risk  Goal: Absence of Fall and Fall-Related Injury  Outcome: Ongoing, Progressing

## 2024-03-22 NOTE — ASSESSMENT & PLAN NOTE
Patient has hypokalemia which is Acute and currently uncontrolled. Most recent potassium levels reviewed-   Lab Results   Component Value Date    K 3.1 (L) 03/22/2024   . Will continue potassium replacement per protocol and recheck repeat levels after replacement completed.

## 2024-03-22 NOTE — ASSESSMENT & PLAN NOTE
Patient's anemia is currently uncontrolled. Has received 2 units of PRBCs on admission . Etiology likely d/t acute blood loss which was from possible GI bleed and Iron deficiency  Baseline Hgb ~ 10-11. Admission Hgb 6.0.  After 2 units, Hgb improved to 8.7.  Iron levels suggest severe deficiency, s/p IV iron.   Retic count elevated which is appropriate.   Current CBC reviewed-   Lab Results   Component Value Date    HGB 8.5 (L) 03/22/2024    HCT 29.6 (L) 03/22/2024       GI consulted for endoscopic evaluation as she is high risk for bleed given being on DAPT plus Eliquis- holding meds.  S/p EGD (3/21) which showed gastritis without bleeding.  S/p colonoscopy (3/22) which showed single polyp s/p removal.   Per GI ok to resume Plavix, discussed with vascular team who started Eliquis and they have said to discontinue it, will be holding aspirin as well.  Resume daily PPI.   Monitor serial CBC and transfuse if patient becomes hemodynamically unstable, symptomatic or H/H drops below 7/21.

## 2024-03-25 ENCOUNTER — PATIENT OUTREACH (OUTPATIENT)
Dept: ADMINISTRATIVE | Facility: CLINIC | Age: 59
End: 2024-03-25

## 2024-03-25 ENCOUNTER — TELEPHONE (OUTPATIENT)
Dept: VASCULAR SURGERY | Facility: CLINIC | Age: 59
End: 2024-03-25
Payer: MEDICARE

## 2024-03-25 ENCOUNTER — TELEPHONE (OUTPATIENT)
Dept: CARDIOLOGY | Facility: CLINIC | Age: 59
End: 2024-03-25
Payer: MEDICARE

## 2024-03-25 LAB
ESTROGEN SERPL-MCNC: NORMAL PG/ML
INSULIN SERPL-ACNC: NORMAL U[IU]/ML
LAB AP GROSS DESCRIPTION: NORMAL
LAB AP LABORATORY NOTES: NORMAL
OHS QRS DURATION: 88 MS
OHS QTC CALCULATION: 454 MS
T3RU NFR SERPL: NORMAL %

## 2024-03-25 NOTE — PLAN OF CARE
[facility] - [facility] Telemetry  Discharge Final Note    Primary Care Provider: Munir Garcia MD    Expected Discharge Date: 3/22/2024    Final Discharge Note (most recent)       Final Note - 03/25/24 0822          Final Note    Assessment Type Final Discharge Note     Anticipated Discharge Disposition Home or Self Care                     Important Message from Medicare  Important Message from Medicare regarding Discharge Appeal Rights: Given to patient/caregiver, Explained to patient/caregiver, Signed/date by patient/caregiver     Date IMM was signed: 03/22/24  Time IMM was signed: 0821    Contact Info       Munir Garcia MD   Specialty: Internal Medicine, Family Medicine, Hospitalist   Relationship: PCP - General    [address] [facility] [address]   Phone: [phone]       Next Steps: Schedule an appointment as soon as possible for a visit on 3/28/2024    Instructions: 1:00 pm    Anne-Marie Pitts MD   Specialty: Hematology and Oncology    [address] [place] [id number]   Phone: [phone]       Next Steps: Schedule an appointment as soon as possible for a visit in 2 week(s)    Ronald Benitez MD   Specialty: Gastroenterology    [address] [place] [address]  [facility] [address]   Phone: [phone]       Next Steps: Schedule an appointment as soon as possible for a visit in 2 week(s)          Pt discharged home with no needs.

## 2024-03-25 NOTE — TELEPHONE ENCOUNTER
Pt. Called wanted to know if ok to stop aspirin and eliquis but continue plavix as instructed at time of hospital D/C. Pt. Advised ok to stop aspirin and eliquis but to continue plavix per Dr. Delarosa. Pt. Voiced understanding.

## 2024-03-27 ENCOUNTER — TELEPHONE (OUTPATIENT)
Dept: FAMILY MEDICINE | Facility: CLINIC | Age: 59
End: 2024-03-27
Payer: MEDICARE

## 2024-03-27 NOTE — TELEPHONE ENCOUNTER
----- Message from Munir Garcia MD sent at 3/26/2024  2:28 PM CDT -----  Need to see in  1 week please  abnl results     0848 Pt is scheduled to come in on 04/04/24

## 2024-03-31 DIAGNOSIS — E11.69 TYPE 2 DIABETES MELLITUS WITH OTHER SPECIFIED COMPLICATION, WITHOUT LONG-TERM CURRENT USE OF INSULIN: ICD-10-CM

## 2024-03-31 DIAGNOSIS — R05.9 COUGH, UNSPECIFIED TYPE: ICD-10-CM

## 2024-04-01 RX ORDER — METOPROLOL SUCCINATE 50 MG/1
50 TABLET, EXTENDED RELEASE ORAL
Qty: 90 TABLET | Refills: 1 | Status: SHIPPED | OUTPATIENT
Start: 2024-04-01

## 2024-04-01 RX ORDER — PIOGLITAZONEHYDROCHLORIDE 15 MG/1
15 TABLET ORAL
Qty: 90 TABLET | Refills: 1 | Status: SHIPPED | OUTPATIENT
Start: 2024-04-01

## 2024-04-03 RX ORDER — INSULIN GLARGINE 100 [IU]/ML
INJECTION, SOLUTION SUBCUTANEOUS
Qty: 12 ML | Refills: 4 | Status: SHIPPED | OUTPATIENT
Start: 2024-04-03

## 2024-04-05 ENCOUNTER — HOSPITAL ENCOUNTER (OUTPATIENT)
Dept: RADIOLOGY | Facility: HOSPITAL | Age: 59
Discharge: HOME | End: 2024-04-05
Attending: NURSE PRACTITIONER
Payer: MEDICARE

## 2024-04-05 DIAGNOSIS — R60.0 LEG EDEMA, RIGHT: ICD-10-CM

## 2024-04-05 DIAGNOSIS — I73.9 PERIPHERAL VASCULAR DISEASE, UNSPECIFIED: ICD-10-CM

## 2024-04-05 DIAGNOSIS — I73.9 PVD (PERIPHERAL VASCULAR DISEASE): ICD-10-CM

## 2024-04-05 PROCEDURE — 93931 UPPER EXTREMITY STUDY: CPT | Mod: TC,RT

## 2024-04-05 PROCEDURE — 93971 EXTREMITY STUDY: CPT | Mod: TC,RT

## 2024-04-05 PROCEDURE — 93931 UPPER EXTREMITY STUDY: CPT | Mod: 26,RT,, | Performed by: SURGERY

## 2024-04-05 PROCEDURE — 93971 EXTREMITY STUDY: CPT | Mod: 26,RT,, | Performed by: SURGERY

## 2024-04-05 PROCEDURE — 93923 UPR/LXTR ART STDY 3+ LVLS: CPT | Mod: TC

## 2024-04-05 PROCEDURE — 93923 UPR/LXTR ART STDY 3+ LVLS: CPT | Mod: 26,,, | Performed by: SURGERY

## 2024-04-09 ENCOUNTER — TELEPHONE (OUTPATIENT)
Dept: GASTROENTEROLOGY | Facility: CLINIC | Age: 59
End: 2024-04-09
Payer: MEDICARE

## 2024-04-09 ENCOUNTER — TELEPHONE (OUTPATIENT)
Dept: VASCULAR SURGERY | Facility: CLINIC | Age: 59
End: 2024-04-09
Payer: MEDICARE

## 2024-04-10 ENCOUNTER — HOSPITAL ENCOUNTER (OUTPATIENT)
Dept: CARDIOLOGY | Facility: HOSPITAL | Age: 59
Discharge: HOME | End: 2024-04-10
Attending: STUDENT IN AN ORGANIZED HEALTH CARE EDUCATION/TRAINING PROGRAM
Payer: MEDICARE

## 2024-04-10 ENCOUNTER — HOSPITAL ENCOUNTER (OUTPATIENT)
Dept: RADIOLOGY | Facility: HOSPITAL | Age: 59
Discharge: HOME | End: 2024-04-10
Attending: STUDENT IN AN ORGANIZED HEALTH CARE EDUCATION/TRAINING PROGRAM
Payer: MEDICARE

## 2024-04-10 VITALS
DIASTOLIC BLOOD PRESSURE: 66 MMHG | BODY MASS INDEX: 29.76 KG/M2 | HEART RATE: 84 BPM | HEIGHT: 67 IN | SYSTOLIC BLOOD PRESSURE: 158 MMHG

## 2024-04-10 DIAGNOSIS — I25.10 ATHEROSCLEROSIS OF NATIVE CORONARY ARTERY OF NATIVE HEART WITHOUT ANGINA PECTORIS: ICD-10-CM

## 2024-04-10 PROCEDURE — 93018 CV STRESS TEST I&R ONLY: CPT | Mod: ,,, | Performed by: STUDENT IN AN ORGANIZED HEALTH CARE EDUCATION/TRAINING PROGRAM

## 2024-04-10 PROCEDURE — 93016 CV STRESS TEST SUPVJ ONLY: CPT | Mod: ,,, | Performed by: NURSE PRACTITIONER

## 2024-04-10 PROCEDURE — 78452 HT MUSCLE IMAGE SPECT MULT: CPT | Mod: TC

## 2024-04-10 PROCEDURE — 78452 HT MUSCLE IMAGE SPECT MULT: CPT | Mod: 26,,, | Performed by: STUDENT IN AN ORGANIZED HEALTH CARE EDUCATION/TRAINING PROGRAM

## 2024-04-10 PROCEDURE — A9500 TC99M SESTAMIBI: HCPCS | Performed by: STUDENT IN AN ORGANIZED HEALTH CARE EDUCATION/TRAINING PROGRAM

## 2024-04-10 PROCEDURE — 93017 CV STRESS TEST TRACING ONLY: CPT

## 2024-04-10 PROCEDURE — 63600175 PHARM REV CODE 636 W HCPCS: Performed by: STUDENT IN AN ORGANIZED HEALTH CARE EDUCATION/TRAINING PROGRAM

## 2024-04-10 RX ORDER — TETRAKIS(2-METHOXYISOBUTYLISOCYANIDE)COPPER(I) TETRAFLUOROBORATE 1 MG/ML
32 INJECTION, POWDER, LYOPHILIZED, FOR SOLUTION INTRAVENOUS
Status: COMPLETED | OUTPATIENT
Start: 2024-04-10 | End: 2024-04-10

## 2024-04-10 RX ORDER — REGADENOSON 0.08 MG/ML
0.4 INJECTION, SOLUTION INTRAVENOUS ONCE
Status: COMPLETED | OUTPATIENT
Start: 2024-04-10 | End: 2024-04-10

## 2024-04-10 RX ORDER — TETRAKIS(2-METHOXYISOBUTYLISOCYANIDE)COPPER(I) TETRAFLUOROBORATE 1 MG/ML
10 INJECTION, POWDER, LYOPHILIZED, FOR SOLUTION INTRAVENOUS
Status: COMPLETED | OUTPATIENT
Start: 2024-04-10 | End: 2024-04-10

## 2024-04-10 RX ADMIN — KIT FOR THE PREPARATION OF TECHNETIUM TC99M SESTAMIBI 10 MILLICURIE: 1 INJECTION, POWDER, LYOPHILIZED, FOR SOLUTION PARENTERAL at 08:04

## 2024-04-10 RX ADMIN — KIT FOR THE PREPARATION OF TECHNETIUM TC99M SESTAMIBI 32 MILLICURIE: 1 INJECTION, POWDER, LYOPHILIZED, FOR SOLUTION PARENTERAL at 09:04

## 2024-04-10 RX ADMIN — REGADENOSON 0.4 MG: 0.08 INJECTION, SOLUTION INTRAVENOUS at 09:04

## 2024-04-17 RX ORDER — ESZOPICLONE 3 MG/1
TABLET, FILM COATED ORAL
Qty: 30 TABLET | Refills: 1 | Status: SHIPPED | OUTPATIENT
Start: 2024-04-17

## 2024-04-17 NOTE — PROGRESS NOTES
Pt. Notified stress test is abnormal f/u in clinic per Dr. Delarosa. Pt. Given appt 4-23-24 but declined due to other appt. Appt then given 5-8-24. Pt voiced understanding.

## 2024-04-19 DIAGNOSIS — Z12.31 BREAST CANCER SCREENING BY MAMMOGRAM: Primary | ICD-10-CM

## 2024-04-22 LAB
CV STRESS BASE HR: 84 BPM
DIASTOLIC BLOOD PRESSURE: 66 MMHG
OHS CV CPX 1 MINUTE RECOVERY HEART RATE: 98 BPM
OHS CV CPX 85 PERCENT MAX PREDICTED HEART RATE MALE: 137
OHS CV CPX MAX PREDICTED HEART RATE: 161
OHS CV CPX PATIENT IS FEMALE: 1
OHS CV CPX PATIENT IS MALE: 0
OHS CV CPX PEAK DIASTOLIC BLOOD PRESSURE: 51 MMHG
OHS CV CPX PEAK HEAR RATE: 102 BPM
OHS CV CPX PEAK RATE PRESSURE PRODUCT: NORMAL
OHS CV CPX PEAK SYSTOLIC BLOOD PRESSURE: 166 MMHG
OHS CV CPX PERCENT MAX PREDICTED HEART RATE ACHIEVED: 66
OHS CV CPX RATE PRESSURE PRODUCT PRESENTING: NORMAL
SYSTOLIC BLOOD PRESSURE: 158 MMHG

## 2024-04-29 LAB
OHS QRS DURATION: 92 MS
OHS QTC CALCULATION: 412 MS

## 2024-04-30 ENCOUNTER — OFFICE VISIT (OUTPATIENT)
Dept: FAMILY MEDICINE | Facility: CLINIC | Age: 59
End: 2024-04-30
Payer: MEDICARE

## 2024-04-30 VITALS
HEIGHT: 67 IN | HEART RATE: 79 BPM | OXYGEN SATURATION: 97 % | SYSTOLIC BLOOD PRESSURE: 138 MMHG | TEMPERATURE: 98 F | WEIGHT: 190 LBS | RESPIRATION RATE: 18 BRPM | BODY MASS INDEX: 29.82 KG/M2 | DIASTOLIC BLOOD PRESSURE: 78 MMHG

## 2024-04-30 DIAGNOSIS — D64.9 ANEMIA, UNSPECIFIED TYPE: Primary | ICD-10-CM

## 2024-04-30 DIAGNOSIS — E11.69 TYPE 2 DIABETES MELLITUS WITH OTHER SPECIFIED COMPLICATION, WITHOUT LONG-TERM CURRENT USE OF INSULIN: ICD-10-CM

## 2024-04-30 DIAGNOSIS — R05.9 COUGH, UNSPECIFIED TYPE: ICD-10-CM

## 2024-04-30 LAB
ANION GAP SERPL CALCULATED.3IONS-SCNC: 11 MMOL/L (ref 7–16)
ANISOCYTOSIS BLD QL SMEAR: NORMAL
BASOPHILS # BLD AUTO: 0.04 K/UL (ref 0–0.2)
BASOPHILS NFR BLD AUTO: 0.5 % (ref 0–1)
BUN SERPL-MCNC: 9 MG/DL (ref 7–18)
BUN/CREAT SERPL: 18 (ref 6–20)
CALCIUM SERPL-MCNC: 9.3 MG/DL (ref 8.5–10.1)
CHLORIDE SERPL-SCNC: 110 MMOL/L (ref 98–107)
CO2 SERPL-SCNC: 24 MMOL/L (ref 21–32)
CREAT SERPL-MCNC: 0.51 MG/DL (ref 0.55–1.02)
DIFFERENTIAL METHOD BLD: ABNORMAL
EGFR (NO RACE VARIABLE) (RUSH/TITUS): 108 ML/MIN/1.73M2
EOSINOPHIL # BLD AUTO: 0.41 K/UL (ref 0–0.5)
EOSINOPHIL NFR BLD AUTO: 4.7 % (ref 1–4)
ERYTHROCYTE [DISTWIDTH] IN BLOOD BY AUTOMATED COUNT: 28.5 % (ref 11.5–14.5)
GLUCOSE SERPL-MCNC: 118 MG/DL (ref 74–106)
HAPTOGLOB SERPL NEPH-MCNC: 186 MG/DL (ref 30–200)
HCT VFR BLD AUTO: 35.5 % (ref 38–47)
HGB BLD-MCNC: 10.2 G/DL (ref 12–16)
HYPOCHROMIA BLD QL SMEAR: NORMAL
IMM GRANULOCYTES # BLD AUTO: 0.02 K/UL (ref 0–0.04)
IMM GRANULOCYTES NFR BLD: 0.2 % (ref 0–0.4)
LDH SERPL-CCNC: 159 U/L (ref 87–241)
LYMPHOCYTES # BLD AUTO: 2.01 K/UL (ref 1–4.8)
LYMPHOCYTES NFR BLD AUTO: 22.8 % (ref 27–41)
MAGNESIUM SERPL-MCNC: 2.2 MG/DL (ref 1.7–2.3)
MCH RBC QN AUTO: 23.9 PG (ref 27–31)
MCHC RBC AUTO-ENTMCNC: 28.7 G/DL (ref 32–36)
MCV RBC AUTO: 83.3 FL (ref 80–96)
MICROCYTES BLD QL SMEAR: NORMAL
MONOCYTES # BLD AUTO: 0.61 K/UL (ref 0–0.8)
MONOCYTES NFR BLD AUTO: 6.9 % (ref 2–6)
MPC BLD CALC-MCNC: 10.8 FL (ref 9.4–12.4)
NEUTROPHILS # BLD AUTO: 5.71 K/UL (ref 1.8–7.7)
NEUTROPHILS NFR BLD AUTO: 64.9 % (ref 53–65)
NRBC # BLD AUTO: 0 X10E3/UL
NRBC, AUTO (.00): 0 %
PLATELET # BLD AUTO: 271 K/UL (ref 150–400)
PLATELET MORPHOLOGY: NORMAL
POTASSIUM SERPL-SCNC: 3.7 MMOL/L (ref 3.5–5.1)
RBC # BLD AUTO: 4.26 M/UL (ref 4.2–5.4)
SODIUM SERPL-SCNC: 141 MMOL/L (ref 136–145)
T4 FREE SERPL-MCNC: 1.06 NG/DL (ref 0.76–1.46)
T4 SERPL-MCNC: 9.8 ΜG/DL (ref 4.8–13.9)
WBC # BLD AUTO: 8.8 K/UL (ref 4.5–11)

## 2024-04-30 PROCEDURE — 99214 OFFICE O/P EST MOD 30 MIN: CPT | Mod: ,,, | Performed by: INTERNAL MEDICINE

## 2024-04-30 PROCEDURE — 85025 COMPLETE CBC W/AUTO DIFF WBC: CPT | Mod: ICN,,, | Performed by: CLINICAL MEDICAL LABORATORY

## 2024-04-30 PROCEDURE — 83735 ASSAY OF MAGNESIUM: CPT | Mod: ICN,,, | Performed by: CLINICAL MEDICAL LABORATORY

## 2024-04-30 PROCEDURE — 84439 ASSAY OF FREE THYROXINE: CPT | Mod: ICN,,, | Performed by: CLINICAL MEDICAL LABORATORY

## 2024-04-30 PROCEDURE — 80048 BASIC METABOLIC PNL TOTAL CA: CPT | Mod: ICN,,, | Performed by: CLINICAL MEDICAL LABORATORY

## 2024-04-30 PROCEDURE — 83615 LACTATE (LD) (LDH) ENZYME: CPT | Mod: ICN,,, | Performed by: CLINICAL MEDICAL LABORATORY

## 2024-04-30 PROCEDURE — 83010 ASSAY OF HAPTOGLOBIN QUANT: CPT | Mod: ICN,,, | Performed by: CLINICAL MEDICAL LABORATORY

## 2024-04-30 RX ORDER — DEXLANSOPRAZOLE 60 MG/1
60 CAPSULE, DELAYED RELEASE ORAL DAILY
Qty: 90 CAPSULE | Refills: 1 | Status: SHIPPED | OUTPATIENT
Start: 2024-04-30

## 2024-04-30 RX ORDER — ISOSORBIDE MONONITRATE 30 MG/1
30 TABLET, EXTENDED RELEASE ORAL DAILY
Qty: 90 TABLET | Refills: 3 | Status: SHIPPED | OUTPATIENT
Start: 2024-04-30 | End: 2025-04-30

## 2024-04-30 RX ORDER — FOSINOPRIL SODIUM 20 MG/1
20 TABLET ORAL DAILY
Qty: 90 TABLET | Refills: 3 | Status: SHIPPED | OUTPATIENT
Start: 2024-04-30

## 2024-04-30 RX ORDER — PROMETHAZINE HYDROCHLORIDE 50 MG/1
50 TABLET ORAL
Qty: 60 TABLET | Refills: 1 | Status: SHIPPED | OUTPATIENT
Start: 2024-04-30 | End: 2024-06-05

## 2024-04-30 RX ORDER — GLIMEPIRIDE 2 MG/1
2 TABLET ORAL
Qty: 90 TABLET | Refills: 1 | Status: SHIPPED | OUTPATIENT
Start: 2024-04-30

## 2024-04-30 RX ORDER — ATORVASTATIN CALCIUM 80 MG/1
80 TABLET, FILM COATED ORAL DAILY
Qty: 90 TABLET | Refills: 1 | Status: SHIPPED | OUTPATIENT
Start: 2024-04-30

## 2024-04-30 RX ORDER — CLOPIDOGREL BISULFATE 75 MG/1
75 TABLET ORAL DAILY
Qty: 90 TABLET | Refills: 1 | Status: SHIPPED | OUTPATIENT
Start: 2024-04-30

## 2024-04-30 RX ORDER — INSULIN ASPART 100 [IU]/ML
20 INJECTION, SUSPENSION SUBCUTANEOUS 2 TIMES DAILY
Qty: 12 ML | Refills: 12 | Status: SHIPPED | OUTPATIENT
Start: 2024-04-30

## 2024-04-30 RX ORDER — FERROUS SULFATE 325(65) MG
325 TABLET, DELAYED RELEASE (ENTERIC COATED) ORAL EVERY OTHER DAY
Qty: 15 TABLET | Refills: 2 | Status: SHIPPED | OUTPATIENT
Start: 2024-04-30

## 2024-05-01 ENCOUNTER — HOSPITAL ENCOUNTER (OUTPATIENT)
Dept: RADIOLOGY | Facility: HOSPITAL | Age: 59
Discharge: HOME | End: 2024-05-01
Attending: INTERNAL MEDICINE
Payer: MEDICARE

## 2024-05-01 DIAGNOSIS — Z12.31 BREAST CANCER SCREENING BY MAMMOGRAM: ICD-10-CM

## 2024-05-01 PROCEDURE — 77063 BREAST TOMOSYNTHESIS BI: CPT | Mod: TC

## 2024-05-08 ENCOUNTER — OFFICE VISIT (OUTPATIENT)
Dept: CARDIOLOGY | Facility: CLINIC | Age: 59
End: 2024-05-08
Payer: MEDICARE

## 2024-05-08 VITALS
HEART RATE: 82 BPM | BODY MASS INDEX: 29.82 KG/M2 | DIASTOLIC BLOOD PRESSURE: 80 MMHG | RESPIRATION RATE: 18 BRPM | WEIGHT: 190 LBS | HEIGHT: 67 IN | SYSTOLIC BLOOD PRESSURE: 138 MMHG

## 2024-05-08 DIAGNOSIS — I10 ESSENTIAL (PRIMARY) HYPERTENSION: Primary | ICD-10-CM

## 2024-05-08 DIAGNOSIS — I25.10 ATHEROSCLEROSIS OF NATIVE CORONARY ARTERY OF NATIVE HEART WITHOUT ANGINA PECTORIS: ICD-10-CM

## 2024-05-08 DIAGNOSIS — R00.0 SINUS TACHYCARDIA: Primary | ICD-10-CM

## 2024-05-08 DIAGNOSIS — I50.22 CHRONIC SYSTOLIC HEART FAILURE: ICD-10-CM

## 2024-05-08 PROCEDURE — 99213 OFFICE O/P EST LOW 20 MIN: CPT | Mod: PBBFAC | Performed by: STUDENT IN AN ORGANIZED HEALTH CARE EDUCATION/TRAINING PROGRAM

## 2024-05-08 PROCEDURE — 99214 OFFICE O/P EST MOD 30 MIN: CPT | Mod: S$PBB,,, | Performed by: STUDENT IN AN ORGANIZED HEALTH CARE EDUCATION/TRAINING PROGRAM

## 2024-05-08 NOTE — PROGRESS NOTES
Subjective:       Patient ID: Anuradha Godfrey is a 58 y.o. female.    Chief Complaint: TCC Call    HPI  .  Patient is status post hospitalization.  Patient presents with a history of anemia etiology is unclear patient has had recent hypokalemia patient also complains of chronic GERD.  Patient was requesting that I follow up on her laboratory data.  Main concern for the patient today is the chronic anemia patient also has type 2 diabetes mellitus as well.  Hospital records reviewed.  Labs reviewed.  Current Medications:    Current Outpatient Medications:     acetaminophen (TYLENOL) 500 MG tablet, Take 1,000 mg by mouth every 6 (six) hours as needed for Pain., Disp: , Rfl:     amitriptyline (ELAVIL) 25 MG tablet, Take 1 tablet (25 mg total) by mouth once daily., Disp: 180 tablet, Rfl: 1    amitriptyline (ELAVIL) 50 MG tablet, Take 1 tablet (50 mg total) by mouth every evening., Disp: 90 tablet, Rfl: 0    apixaban (ELIQUIS) 2.5 mg Tab, Take 1 tablet (2.5 mg total) by mouth once daily., Disp: 30 tablet, Rfl: 5    cycloSPORINE (RESTASIS) 0.05 % ophthalmic emulsion, PLACE 1 DROP INTO BOTH EYES TWICE DAILY, Disp: 60 each, Rfl: 3    diclofenac sodium (VOLTAREN) 1 % Gel, Apply topically., Disp: , Rfl:     docusate sodium (COLACE) 50 MG capsule, Take 50 mg by mouth once daily., Disp: , Rfl:     eszopiclone (LUNESTA) 3 mg Tab, TAKE 1 TABLET BY MOUTH EVERY NIGHT AT BEDTIME AS NEEDED FOR SLEEP *AVOID ALCOHOL *CAUSES DROWSINESS*, Disp: 30 tablet, Rfl: 1    HYDROcodone-acetaminophen (NORCO)  mg per tablet, TAKE 1 TABLET BY MOUTH 4 TIMES DAILY AS NEEDED ..MAY CAUSE DROWSINESS- AVOID ALCOHOL, Disp: , Rfl:     LANTUS SOLOSTAR U-100 INSULIN glargine 100 units/mL SubQ pen, INJECT INJECT 35 UNITS UNDER THE SKIN EVERY NIGHT AT BEDTIME, Disp: 12 mL, Rfl: 4    methocarbamoL (ROBAXIN) 750 MG Tab, TAKE 1 TABLET BY MOUTH 3 TIMES DAILY WITH FOOD AS NEEDED ..MAY CAUSE DROWSINESS- AVOID ALCOHOL, Disp: , Rfl:     metoprolol succinate  (TOPROL-XL) 50 MG 24 hr tablet, TAKE 1 TABLET BY MOUTH ONCE DAILY, Disp: 90 tablet, Rfl: 1    morphine (MS CONTIN) 15 MG 12 hr tablet, TAKE 1 TABLET BY MOUTH EACH NIGHT AT BEDTIME ..MAY CAUSE DROWSINESS- AVOID ALCOHOL, Disp: , Rfl:     nitroGLYCERIN (NITROSTAT) 0.4 MG SL tablet, DISSOLVE 1 TABLET UNDER TONGUE FOR CHEST PAIN EVERY 5 MINUTES X 3 DOSES IF NO RELIEF GO TO EMERGENCY ROOM (Patient not taking: Reported on 5/8/2024), Disp: 25 tablet, Rfl: 2    pioglitazone (ACTOS) 15 MG tablet, TAKE 1 TABLET BY MOUTH ONCE DAILY, Disp: 90 tablet, Rfl: 1    pregabalin (LYRICA) 200 MG Cap, TAKE 1 CAPSULE BY MOUTH 3 TIMES DAILY ..MAY CAUSE DROWSINESS- AVOID ALCOHOL, Disp: 90 capsule, Rfl: 0    ubrogepant (UBRELVY) 100 mg tablet, Take 1 tablet (100 mg total) by mouth as needed for Migraine. If symptoms persist or return, may repeat dose after 2 hours. Maximum: 200 mg per 24 hours, Disp: 8 tablet, Rfl: 5    atorvastatin (LIPITOR) 80 MG tablet, Take 1 tablet (80 mg total) by mouth once daily., Disp: 90 tablet, Rfl: 1    blood sugar diagnostic Strp, 1 strip by Misc.(Non-Drug; Combo Route) route 3 (three) times daily., Disp: 200 strip, Rfl: 6    clopidogreL (PLAVIX) 75 mg tablet, Take 1 tablet (75 mg total) by mouth once daily., Disp: 90 tablet, Rfl: 1    dexlansoprazole (DEXILANT) 60 mg capsule, Take 1 capsule (60 mg total) by mouth once daily., Disp: 90 capsule, Rfl: 1    esomeprazole (NEXIUM) 40 MG capsule, TAKE 1 CAPSULE BY MOUTH EACH MORNING BEFORE BREAKFAST (Patient not taking: Reported on 4/30/2024), Disp: 30 capsule, Rfl: 10    ferrous sulfate 325 (65 FE) MG EC tablet, Take 1 tablet (325 mg total) by mouth every other day., Disp: 15 tablet, Rfl: 2    fosinopriL (MONOPRIL) 20 MG tablet, Take 1 tablet (20 mg total) by mouth once daily., Disp: 90 tablet, Rfl: 3    glimepiride (AMARYL) 2 MG tablet, Take 1 tablet (2 mg total) by mouth daily with breakfast., Disp: 90 tablet, Rfl: 1    isosorbide mononitrate (IMDUR) 30 MG 24 hr  "tablet, Take 1 tablet (30 mg total) by mouth once daily., Disp: 90 tablet, Rfl: 3    NOVOLOG MIX 70-30FLEXPEN U-100 100 unit/mL (70-30) InPn pen, Inject 20 Units into the skin 2 (two) times a day., Disp: 12 mL, Rfl: 12    promethazine (PHENERGAN) 50 MG tablet, Take 1 tablet (50 mg total) by mouth every 4 to 6 hours as needed for Nausea., Disp: 60 tablet, Rfl: 1           ROS  Twelve point system reviewed, unremarkable except for stated above in HPI.        Objective:         Vitals:    04/30/24 1026   BP: 138/78   BP Location: Left arm   Patient Position: Sitting   BP Method: Large (Automatic)   Pulse: 79   Resp: 18   Temp: 97.8 °F (36.6 °C)   TempSrc: Temporal   SpO2: 97%   Weight: 86.2 kg (190 lb)   Height: 5' 7" (1.702 m)        Physical Exam     Patient is awake alert oriented person place and  Lungs are clear to auscultation bilaterally no crackles or wheezes   Cardiovascular S1-S2 regular rate and rhythm no murmurs rubs or gallops   Abdomen is soft positive bowel sounds nontender, extremities no clubbing cyanosis edema  Neuro no focal neurological deficits  Skin warm and dry.     Last Labs:     Office Visit on 04/30/2024   Component Date Value    Sodium 04/30/2024 141     Potassium 04/30/2024 3.7     Chloride 04/30/2024 110 (H)     CO2 04/30/2024 24     Anion Gap 04/30/2024 11     Glucose 04/30/2024 118 (H)     BUN 04/30/2024 9     Creatinine 04/30/2024 0.51 (L)     BUN/Creatinine Ratio 04/30/2024 18     Calcium 04/30/2024 9.3     eGFR 04/30/2024 108     Magnesium 04/30/2024 2.2     Total T4 04/30/2024 9.8     Free T4 04/30/2024 1.06     LDH 04/30/2024 159     Haptoglobin 04/30/2024 186     WBC 04/30/2024 8.80     RBC 04/30/2024 4.26     Hemoglobin 04/30/2024 10.2 (L)     Hematocrit 04/30/2024 35.5 (L)     MCV 04/30/2024 83.3     MCH 04/30/2024 23.9 (L)     MCHC 04/30/2024 28.7 (L)     RDW 04/30/2024 28.5 (H)     Platelet Count 04/30/2024 271     MPV 04/30/2024 10.8     Neutrophils % 04/30/2024 64.9     " Lymphocytes % 04/30/2024 22.8 (L)     Monocytes % 04/30/2024 6.9 (H)     Eosinophils % 04/30/2024 4.7 (H)     Basophils % 04/30/2024 0.5     Immature Granulocytes % 04/30/2024 0.2     nRBC, Auto 04/30/2024 0.0     Neutrophils, Abs 04/30/2024 5.71     Lymphocytes, Absolute 04/30/2024 2.01     Monocytes, Absolute 04/30/2024 0.61     Eosinophils, Absolute 04/30/2024 0.41     Basophils, Absolute 04/30/2024 0.04     Immature Granulocytes, A* 04/30/2024 0.02     nRBC, Absolute 04/30/2024 0.00     Diff Type 04/30/2024 Scan Smear     Platelet Morphology 04/30/2024 Normal     Anisocytosis 04/30/2024 2+     Microcytosis 04/30/2024 1+     Hypochromic 04/30/2024 1+    Hospital Outpatient Visit on 04/10/2024   Component Date Value    85% Max Predicted HR 04/10/2024 137     Max Predicted HR 04/10/2024 161     [facility] CV CPX PATIENT IS MA* 04/10/2024 0.0     [facility] CV CPX PATIENT IS FE* 04/10/2024 1.0     HR at rest 04/10/2024 84     Systolic blood pressure 04/10/2024 158     Diastolic blood pressure 04/10/2024 66     RPP 04/10/2024 13,272     Peak HR 04/10/2024 102     Peak Systolic BP 04/10/2024 166     Peak Diatolic BP 04/10/2024 51     Peak RPP 04/10/2024 16,932     % Max HR Achieved 04/10/2024 66     1 Minute Recovery HR 04/10/2024 98    Lab Visit on 04/05/2024   Component Date Value    WBC 04/05/2024 8.91     RBC 04/05/2024 4.30     Hemoglobin 04/05/2024 9.3 (L)     Hematocrit 04/05/2024 34.3 (L)     MCV 04/05/2024 79.8 (L)     MCH 04/05/2024 21.6 (L)     MCHC 04/05/2024 27.1 (L)     RDW 04/05/2024 27.6 (H)     Platelet Count 04/05/2024 337     MPV 04/05/2024 11.4     Neutrophils % 04/05/2024 41.2 (L)     Lymphocytes % 04/05/2024 41.3 (H)     Monocytes % 04/05/2024 11.1 (H)     Eosinophils % 04/05/2024 5.5 (H)     Basophils % 04/05/2024 0.8     Immature Granulocytes % 04/05/2024 0.1     nRBC, Auto 04/05/2024 0.0     Neutrophils, Abs 04/05/2024 3.67     Lymphocytes, Absolute 04/05/2024 3.68     Monocytes, Absolute 04/05/2024 0.99  (H)     Eosinophils, Absolute 04/05/2024 0.49     Basophils, Absolute 04/05/2024 0.07     Immature Granulocytes, A* 04/05/2024 0.01     nRBC, Absolute 04/05/2024 0.00     Diff Type 04/05/2024 Scan Smear     Platelet Morphology 04/05/2024 Few Large Platelets (A)     Anisocytosis 04/05/2024 2+     Ovalocytes 04/05/2024 Few        Last Imaging:  Mammo Digital Screening Bilat w/ [place]  Narrative: Result:   Mammo Digital Screening Bilat w/ [place]     History:  Patient is 58 y.o. and is seen for a screening mammogram.    Films Compared:  Compared to: 11/29/2022 Mammo Digital Screening Bilat, 11/18/2021 Mammo   Digital Screening Bilat, and 05/12/2021 US Breast Left Limited     Findings:  This procedure was performed using tomosynthesis. Computer-aided detection   was utilized in the interpretation of this examination.  The breasts have scattered areas of fibroglandular density. There is no   evidence of suspicious masses, calcifications, or other abnormal findings.  Impression: Bilateral  There is no mammographic evidence of malignancy.    BI-RADS Category:   Overall: 1 - Negative       Recommendation:  Routine screening mammogram in 1 year is recommended.    Your estimated lifetime risk of breast cancer (to age 85) based on   Tyrer-Cuzick risk assessment model is Tyrer-Cuzick: 5.33%. According to   the [employer] Society, patients with a lifetime breast cancer risk   of 20% or higher might benefit from supplemental screening tests.         **Labs and x-rays personally reviewed by me    ** reviewed           Assessment & Plan:       1. Anemia, unspecified type  -     Basic Metabolic Panel; Future; Expected date: 04/30/2024  -     CBC Auto Differential; Future; Expected date: 04/30/2024  -     Magnesium; Future; Expected date: 04/30/2024  -     T4; Future; Expected date: 04/30/2024  -     T4, Free; Future; Expected date: 04/30/2024  -     Lactate Dehydrogenase; Future; Expected date: 04/30/2024  -     Haptoglobin;  Future; Expected date: 04/30/2024  -     Ambulatory referral/consult to Hematology / Oncology; Future; Expected date: 05/07/2024  -     CBC Morphology    2. Cough, unspecified type  -     atorvastatin (LIPITOR) 80 MG tablet; Take 1 tablet (80 mg total) by mouth once daily.  Dispense: 90 tablet; Refill: 1  -     blood sugar diagnostic Strp; 1 strip by Misc.(Non-Drug; Combo Route) route 3 (three) times daily.  Dispense: 200 strip; Refill: 6  -     clopidogreL (PLAVIX) 75 mg tablet; Take 1 tablet (75 mg total) by mouth once daily.  Dispense: 90 tablet; Refill: 1  -     glimepiride (AMARYL) 2 MG tablet; Take 1 tablet (2 mg total) by mouth daily with breakfast.  Dispense: 90 tablet; Refill: 1  -     promethazine (PHENERGAN) 50 MG tablet; Take 1 tablet (50 mg total) by mouth every 4 to 6 hours as needed for Nausea.  Dispense: 60 tablet; Refill: 1    3. Type 2 diabetes mellitus with other specified complication, without long-term current use of insulin  -     atorvastatin (LIPITOR) 80 MG tablet; Take 1 tablet (80 mg total) by mouth once daily.  Dispense: 90 tablet; Refill: 1  -     blood sugar diagnostic Strp; 1 strip by Misc.(Non-Drug; Combo Route) route 3 (three) times daily.  Dispense: 200 strip; Refill: 6  -     clopidogreL (PLAVIX) 75 mg tablet; Take 1 tablet (75 mg total) by mouth once daily.  Dispense: 90 tablet; Refill: 1  -     glimepiride (AMARYL) 2 MG tablet; Take 1 tablet (2 mg total) by mouth daily with breakfast.  Dispense: 90 tablet; Refill: 1  -     promethazine (PHENERGAN) 50 MG tablet; Take 1 tablet (50 mg total) by mouth every 4 to 6 hours as needed for Nausea.  Dispense: 60 tablet; Refill: 1    Other orders  -     ferrous sulfate 325 (65 FE) MG EC tablet; Take 1 tablet (325 mg total) by mouth every other day.  Dispense: 15 tablet; Refill: 2  -     fosinopriL (MONOPRIL) 20 MG tablet; Take 1 tablet (20 mg total) by mouth once daily.  Dispense: 90 tablet; Refill: 3  -     isosorbide mononitrate (IMDUR) 30  MG 24 hr tablet; Take 1 tablet (30 mg total) by mouth once daily.  Dispense: 90 tablet; Refill: 3  -     NOVOLOG MIX 70-30FLEXPEN U-100 100 unit/mL (70-30) InPn pen; Inject 20 Units into the skin 2 (two) times a day.  Dispense: 12 mL; Refill: 12  -     dexlansoprazole (DEXILANT) 60 mg capsule; Take 1 capsule (60 mg total) by mouth once daily.  Dispense: 90 capsule; Refill: 1            Munir Garcia MD

## 2024-05-24 PROBLEM — I50.22 CHRONIC SYSTOLIC HEART FAILURE: Status: ACTIVE | Noted: 2024-05-24

## 2024-05-24 NOTE — ASSESSMENT & PLAN NOTE
Ischemic cardiomyopathy;hx of NSTEMI s/p PCI  EF 35%  GDMT - fosinopril 20mg qd, metoprolol 50mg qd, IMDUR

## 2024-05-24 NOTE — PROGRESS NOTES
PCP: Munir Garcia MD    Referring Provider:     Subjective:   Anuradha Godfrey is a 58 y.o. female,nonsmoker, with hx of HTN, CAD status post multivessel PCI to LAD, circumflex, RCA in February of 2021, DM uncontrolled, 4/13/21  A1C  11.5,  PAD, left above knee amputation, who presents for follow up     5/8/24 - Will stop eliquis (unclear reason for AC). Continue Plavix.     3/4/24 - Patient report increasing frequency of hypertensive episodes with right arm pain taking 3-4 nitro/week with improvement. Her A1c is better controlled now.     11/2022 - Pt reports no further chest pain. She reports elevated blood pressure when in pain.     Denies SOB, orthopnea, syncope, palpitations or dizziness.      Family history of vascular disease and cancer.     Denies history of smoking or alcohol abuse.     [facility] 2/2/21: Severe 3 vessel disease.   PCI to Prox LAD with a 3.0X23mm Xience Ya,  IVUS of the LADU                      Unsuccessful balloon angioplasty of the prox LCx, unable to deliver a stent proximally due to calcification. 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          2/9/21:  [facility]  PCI to RCA and PCI to LCx.   ECHO, Jae's 2020,  EF 50-55%.  Grade 1 diastolic dysfunction.     ECHO Results for orders placed during the hospital encounter of 11/29/22    Echo    Interpretation Summary  · The left ventricle is normal in size with mild concentric hypertrophy and moderately decreased systolic function.  · The estimated ejection fraction is 35%.  · Left ventricular diastolic dysfunction.  · Normal right ventricular size with normal right ventricular systolic function.  · Mild right atrial enlargement.  · Moderate mitral regurgitation.  · Normal central venous pressure (3 mmHg).  · The estimated PA systolic pressure is 34 mmHg.      Past Surgical History:   Procedure Laterality Date    APPENDECTOMY Right     BREAST BIOPSY      CARDIAC CATHETERIZATION  02/04/2021    PCI to prox LAD;  IVUS of LAD    CHOLECYSTECTOMY      OOPHORECTOMY      TOTAL ABDOMINAL HYSTERECTOMY        Family History   Problem Relation Name Age of Onset    Heart disease Mother      Stroke Mother      Heart disease Brother        Social History     Socioeconomic History    Marital status: Single   Tobacco Use    Smoking status: Never    Smokeless tobacco: Never   Substance and Sexual Activity    Alcohol use: Not Currently    Drug use: Never    Sexual activity: Not Currently     Social Determinants of Health     Financial Resource Strain: Low Risk  (3/21/2024)    Overall Financial Resource Strain (CARDIA)     Difficulty of Paying Living Expenses: Not hard at all   Food Insecurity: No Food Insecurity (3/21/2024)    Hunger Vital Sign     Worried About Running Out of Food in the Last Year: Never true     Ran Out of Food in the Last Year: Never true   Transportation Needs: No Transportation Needs (3/21/2024)    PRAPARE - Transportation     Lack of Transportation (Medical): No     Lack of Transportation (Non-Medical): No   Physical Activity: Inactive (3/21/2024)    Exercise Vital Sign     Days of Exercise per Week: 0 days     Minutes of Exercise per Session: 0 min   Stress: No Stress Concern Present (3/21/2024)    [employer] - Occupational Stress Questionnaire     Feeling of Stress : Not at all   Housing Stability: Low Risk  (3/21/2024)    Housing Stability Vital Sign     Unable to Pay for Housing in the Last Year: No     Number of Places Lived in the Last Year: 1     Unstable Housing in the Last Year: No           Lab Results   Component Value Date     04/30/2024    K 3.7 04/30/2024     (H) 04/30/2024    CO2 24 04/30/2024    BUN 9 04/30/2024    CREATININE 0.51 (L) 04/30/2024    CALCIUM 9.3 04/30/2024    ANIONGAP 11 04/30/2024    ESTGFRAFRICA 157 04/13/2021    EGFRNONAA 102 06/20/2022       Lab Results   Component Value Date    CHOL 146 01/30/2024    CHOL 213 (H) 01/23/2023    CHOL 191  04/13/2021     Lab Results   Component Value Date    HDL 31 (L) 01/30/2024    HDL 34 (L) 01/23/2023    HDL 38 04/13/2021     Lab Results   Component Value Date    LDLCALC 80 01/30/2024    LDLCALC 133 01/23/2023    LDLCALC 100 04/13/2021     Lab Results   Component Value Date    TRIG 174 (H) 01/30/2024    TRIG 232 (H) 01/23/2023    TRIG 263 04/13/2021     Lab Results   Component Value Date    CHOLHDL 4.7 01/30/2024    CHOLHDL 6.3 01/23/2023    CHOLHDL 5.2 10/12/2020       Lab Results   Component Value Date    WBC 8.80 04/30/2024    HGB 10.2 (L) 04/30/2024    HCT 35.5 (L) 04/30/2024    MCV 83.3 04/30/2024     04/30/2024           Current Outpatient Medications:     acetaminophen (TYLENOL) 500 MG tablet, Take 1,000 mg by mouth every 6 (six) hours as needed for Pain., Disp: , Rfl:     amitriptyline (ELAVIL) 25 MG tablet, Take 1 tablet (25 mg total) by mouth once daily., Disp: 180 tablet, Rfl: 1    amitriptyline (ELAVIL) 50 MG tablet, Take 1 tablet (50 mg total) by mouth every evening., Disp: 90 tablet, Rfl: 0    apixaban (ELIQUIS) 2.5 mg Tab, Take 1 tablet (2.5 mg total) by mouth once daily., Disp: 30 tablet, Rfl: 5    atorvastatin (LIPITOR) 80 MG tablet, Take 1 tablet (80 mg total) by mouth once daily., Disp: 90 tablet, Rfl: 1    clopidogreL (PLAVIX) 75 mg tablet, Take 1 tablet (75 mg total) by mouth once daily., Disp: 90 tablet, Rfl: 1    cycloSPORINE (RESTASIS) 0.05 % ophthalmic emulsion, PLACE 1 DROP INTO BOTH EYES TWICE DAILY, Disp: 60 each, Rfl: 3    dexlansoprazole (DEXILANT) 60 mg capsule, Take 1 capsule (60 mg total) by mouth once daily., Disp: 90 capsule, Rfl: 1    diclofenac sodium (VOLTAREN) 1 % Gel, Apply topically., Disp: , Rfl:     docusate sodium (COLACE) 50 MG capsule, Take 50 mg by mouth once daily., Disp: , Rfl:     eszopiclone (LUNESTA) 3 mg Tab, TAKE 1 TABLET BY MOUTH EVERY NIGHT AT BEDTIME AS NEEDED FOR SLEEP *AVOID ALCOHOL *CAUSES DROWSINESS*, Disp: 30 tablet, Rfl: 1    ferrous sulfate 325  (65 FE) MG EC tablet, Take 1 tablet (325 mg total) by mouth every other day., Disp: 15 tablet, Rfl: 2    fosinopriL (MONOPRIL) 20 MG tablet, Take 1 tablet (20 mg total) by mouth once daily., Disp: 90 tablet, Rfl: 3    glimepiride (AMARYL) 2 MG tablet, Take 1 tablet (2 mg total) by mouth daily with breakfast., Disp: 90 tablet, Rfl: 1    HYDROcodone-acetaminophen (NORCO)  mg per tablet, TAKE 1 TABLET BY MOUTH 4 TIMES DAILY AS NEEDED ..MAY CAUSE DROWSINESS- AVOID ALCOHOL, Disp: , Rfl:     isosorbide mononitrate (IMDUR) 30 MG 24 hr tablet, Take 1 tablet (30 mg total) by mouth once daily., Disp: 90 tablet, Rfl: 3    LANTUS SOLOSTAR U-100 INSULIN glargine 100 units/mL SubQ pen, INJECT INJECT 35 UNITS UNDER THE SKIN EVERY NIGHT AT BEDTIME, Disp: 12 mL, Rfl: 4    methocarbamoL (ROBAXIN) 750 MG Tab, TAKE 1 TABLET BY MOUTH 3 TIMES DAILY WITH FOOD AS NEEDED ..MAY CAUSE DROWSINESS- AVOID ALCOHOL, Disp: , Rfl:     metoprolol succinate (TOPROL-XL) 50 MG 24 hr tablet, TAKE 1 TABLET BY MOUTH ONCE DAILY, Disp: 90 tablet, Rfl: 1    morphine (MS CONTIN) 15 MG 12 hr tablet, TAKE 1 TABLET BY MOUTH EACH NIGHT AT BEDTIME ..MAY CAUSE DROWSINESS- AVOID ALCOHOL, Disp: , Rfl:     NOVOLOG MIX 70-30FLEXPEN U-100 100 unit/mL (70-30) InPn pen, Inject 20 Units into the skin 2 (two) times a day., Disp: 12 mL, Rfl: 12    pioglitazone (ACTOS) 15 MG tablet, TAKE 1 TABLET BY MOUTH ONCE DAILY, Disp: 90 tablet, Rfl: 1    pregabalin (LYRICA) 200 MG Cap, TAKE 1 CAPSULE BY MOUTH 3 TIMES DAILY ..MAY CAUSE DROWSINESS- AVOID ALCOHOL, Disp: 90 capsule, Rfl: 0    promethazine (PHENERGAN) 50 MG tablet, Take 1 tablet (50 mg total) by mouth every 4 to 6 hours as needed for Nausea., Disp: 60 tablet, Rfl: 1    ubrogepant (UBRELVY) 100 mg tablet, Take 1 tablet (100 mg total) by mouth as needed for Migraine. If symptoms persist or return, may repeat dose after 2 hours. Maximum: 200 mg per 24 hours, Disp: 8 tablet, Rfl: 5    blood sugar diagnostic Strp, 1 strip by  "Misc.(Non-Drug; Combo Route) route 3 (three) times daily., Disp: 200 strip, Rfl: 6    esomeprazole (NEXIUM) 40 MG capsule, TAKE 1 CAPSULE BY MOUTH EACH MORNING BEFORE BREAKFAST (Patient not taking: Reported on 4/30/2024), Disp: 30 capsule, Rfl: 10    nitroGLYCERIN (NITROSTAT) 0.4 MG SL tablet, DISSOLVE 1 TABLET UNDER TONGUE FOR CHEST PAIN EVERY 5 MINUTES X 3 DOSES IF NO RELIEF GO TO EMERGENCY ROOM (Patient not taking: Reported on 5/8/2024), Disp: 25 tablet, Rfl: 2       Review of Systems   Constitutional:  Negative for chills.   Respiratory:  Negative for cough and shortness of breath.    Cardiovascular:  Negative for chest pain, palpitations, orthopnea, claudication, leg swelling and PND.   Neurological:  Negative for headaches.         Objective:   /80   Pulse 82   Resp 18   Ht 5' 7" (1.702 m)   Wt 86.2 kg (190 lb)   BMI 29.76 kg/m²     Physical Exam  Constitutional:       Appearance: Normal appearance.   Eyes:      General: No scleral icterus.  Neck:      Vascular: No carotid bruit.   Cardiovascular:      Rate and Rhythm: Normal rate and regular rhythm.      Pulses: Normal pulses.      Heart sounds: Normal heart sounds. No murmur heard.  Pulmonary:      Effort: Pulmonary effort is normal.      Breath sounds: Normal breath sounds.   Musculoskeletal:         General: No swelling.      Right lower leg: No edema.      Left lower leg: No edema.      Comments: Left above knee amputation       Left Lower Extremity: Left leg is amputated above knee.   Neurological:      Mental Status: She is alert and oriented to person, place, and time.           Assessment:     1. Sinus tachycardia        2. Chronic systolic heart failure                Plan:   Sinus tachycardia  Persistent sinus tach  HR in 103s  On metop 12.5mg bid  ECHO with low EF of 35%    Chronic systolic heart failure  Ischemic cardiomyopathy;hx of NSTEMI s/p PCI  EF 35%  GDMT - fosinopril 20mg qd, metoprolol 50mg qd, IMDUR    Atherosclerosis of " native coronary artery of native heart without angina pectoris  Multivessel PCI in February 2021  Worsening angina  On  IMDUR 30mg qd  Lexiscan   Aspirin 81mg (aspirin desensitization), Plavix and (stop - Eliquis 2.5mg for her complex vascular issues )        #Hyperlipidemia  Hypertriglyceridemia  [id number]   On atorvastatin   Zetia 10 mg daily.  Vascepa 2mg twice a day.    #Diabetes   Improved  FUP in 6 months

## 2024-05-24 NOTE — ASSESSMENT & PLAN NOTE
Multivessel PCI in February 2021  Worsening angina  On  IMDUR 30mg qd  Lexiscan   Aspirin 81mg (aspirin desensitization), Plavix and (stop - Eliquis 2.5mg for her complex vascular issues )

## 2024-05-28 RX ORDER — NITROGLYCERIN 0.4 MG/1
TABLET SUBLINGUAL
Qty: 25 TABLET | Refills: 2 | Status: SHIPPED | OUTPATIENT
Start: 2024-05-28

## 2024-06-05 DIAGNOSIS — E11.69 TYPE 2 DIABETES MELLITUS WITH OTHER SPECIFIED COMPLICATION, WITHOUT LONG-TERM CURRENT USE OF INSULIN: ICD-10-CM

## 2024-06-05 DIAGNOSIS — R05.9 COUGH, UNSPECIFIED TYPE: ICD-10-CM

## 2024-06-05 RX ORDER — PROMETHAZINE HYDROCHLORIDE 50 MG/1
TABLET ORAL
Qty: 60 TABLET | Refills: 1 | Status: SHIPPED | OUTPATIENT
Start: 2024-06-05

## 2024-06-26 RX ORDER — ESZOPICLONE 3 MG/1
TABLET, FILM COATED ORAL
Qty: 30 TABLET | Refills: 1 | Status: SHIPPED | OUTPATIENT
Start: 2024-06-26

## 2024-06-28 DIAGNOSIS — Z01.818 PRE-OPERATIVE CLEARANCE: Primary | ICD-10-CM

## 2024-07-09 DIAGNOSIS — Z71.89 COMPLEX CARE COORDINATION: ICD-10-CM

## 2024-08-08 DIAGNOSIS — E11.69 TYPE 2 DIABETES MELLITUS WITH OTHER SPECIFIED COMPLICATION, WITHOUT LONG-TERM CURRENT USE OF INSULIN: ICD-10-CM

## 2024-08-08 DIAGNOSIS — R05.9 COUGH, UNSPECIFIED TYPE: ICD-10-CM

## 2024-08-08 RX ORDER — PROMETHAZINE HYDROCHLORIDE 50 MG/1
TABLET ORAL
Qty: 60 TABLET | Refills: 1 | Status: SHIPPED | OUTPATIENT
Start: 2024-08-08

## 2024-08-13 RX ORDER — NITROGLYCERIN 0.4 MG/1
TABLET SUBLINGUAL
Qty: 25 TABLET | Refills: 2 | Status: SHIPPED | OUTPATIENT
Start: 2024-08-13

## 2024-08-20 ENCOUNTER — OFFICE VISIT (OUTPATIENT)
Dept: FAMILY MEDICINE | Facility: CLINIC | Age: 59
End: 2024-08-20
Payer: MEDICARE

## 2024-08-20 VITALS
HEART RATE: 85 BPM | DIASTOLIC BLOOD PRESSURE: 79 MMHG | TEMPERATURE: 98 F | OXYGEN SATURATION: 96 % | SYSTOLIC BLOOD PRESSURE: 127 MMHG | RESPIRATION RATE: 18 BRPM | HEIGHT: 67 IN | BODY MASS INDEX: 31.71 KG/M2 | WEIGHT: 202 LBS

## 2024-08-20 DIAGNOSIS — H92.02 LEFT EAR PAIN: ICD-10-CM

## 2024-08-20 DIAGNOSIS — D64.9 ANEMIA, UNSPECIFIED TYPE: ICD-10-CM

## 2024-08-20 DIAGNOSIS — R05.9 COUGH, UNSPECIFIED TYPE: Primary | ICD-10-CM

## 2024-08-20 DIAGNOSIS — E11.69 TYPE 2 DIABETES MELLITUS WITH OTHER SPECIFIED COMPLICATION, WITHOUT LONG-TERM CURRENT USE OF INSULIN: ICD-10-CM

## 2024-08-20 LAB
BASOPHILS # BLD AUTO: 0.05 K/UL (ref 0–0.2)
BASOPHILS NFR BLD AUTO: 0.7 % (ref 0–1)
CTP QC/QA: YES
DIFFERENTIAL METHOD BLD: ABNORMAL
EOSINOPHIL # BLD AUTO: 0.34 K/UL (ref 0–0.5)
EOSINOPHIL NFR BLD AUTO: 5 % (ref 1–4)
ERYTHROCYTE [DISTWIDTH] IN BLOOD BY AUTOMATED COUNT: 16.2 % (ref 11.5–14.5)
EST. AVERAGE GLUCOSE BLD GHB EST-MCNC: 186 MG/DL
FERRITIN SERPL-MCNC: 131 NG/ML (ref 8–252)
HBA1C MFR BLD HPLC: 8.1 % (ref 4.5–6.6)
HCT VFR BLD AUTO: 41.9 % (ref 38–47)
HGB BLD-MCNC: 13 G/DL (ref 12–16)
IMM GRANULOCYTES # BLD AUTO: 0.03 K/UL (ref 0–0.04)
IMM GRANULOCYTES NFR BLD: 0.4 % (ref 0–0.4)
IRON SERPL-MCNC: 72 ΜG/DL (ref 50–170)
LYMPHOCYTES # BLD AUTO: 2.06 K/UL (ref 1–4.8)
LYMPHOCYTES NFR BLD AUTO: 30.4 % (ref 27–41)
MCH RBC QN AUTO: 29.1 PG (ref 27–31)
MCHC RBC AUTO-ENTMCNC: 31 G/DL (ref 32–36)
MCV RBC AUTO: 93.7 FL (ref 80–96)
MOLECULAR STREP A: NEGATIVE
MONOCYTES # BLD AUTO: 0.64 K/UL (ref 0–0.8)
MONOCYTES NFR BLD AUTO: 9.4 % (ref 2–6)
MPC BLD CALC-MCNC: 11.2 FL (ref 9.4–12.4)
NEUTROPHILS # BLD AUTO: 3.66 K/UL (ref 1.8–7.7)
NEUTROPHILS NFR BLD AUTO: 54.1 % (ref 53–65)
NRBC # BLD AUTO: 0 X10E3/UL
NRBC, AUTO (.00): 0 %
PLATELET # BLD AUTO: 267 K/UL (ref 150–400)
POC MOLECULAR INFLUENZA A AGN: NEGATIVE
POC MOLECULAR INFLUENZA B AGN: NEGATIVE
RBC # BLD AUTO: 4.47 M/UL (ref 4.2–5.4)
SARS-COV-2 RDRP RESP QL NAA+PROBE: NEGATIVE
WBC # BLD AUTO: 6.78 K/UL (ref 4.5–11)

## 2024-08-20 PROCEDURE — 83036 HEMOGLOBIN GLYCOSYLATED A1C: CPT | Mod: ,,, | Performed by: CLINICAL MEDICAL LABORATORY

## 2024-08-20 PROCEDURE — 87635 SARS-COV-2 COVID-19 AMP PRB: CPT | Mod: RHCUB | Performed by: INTERNAL MEDICINE

## 2024-08-20 PROCEDURE — 83540 ASSAY OF IRON: CPT | Mod: ,,, | Performed by: CLINICAL MEDICAL LABORATORY

## 2024-08-20 PROCEDURE — 87502 INFLUENZA DNA AMP PROBE: CPT | Mod: RHCUB | Performed by: INTERNAL MEDICINE

## 2024-08-20 PROCEDURE — 85025 COMPLETE CBC W/AUTO DIFF WBC: CPT | Mod: ,,, | Performed by: CLINICAL MEDICAL LABORATORY

## 2024-08-20 PROCEDURE — 82728 ASSAY OF FERRITIN: CPT | Mod: ,,, | Performed by: CLINICAL MEDICAL LABORATORY

## 2024-08-20 PROCEDURE — 99214 OFFICE O/P EST MOD 30 MIN: CPT | Mod: ,,, | Performed by: INTERNAL MEDICINE

## 2024-08-20 PROCEDURE — 87651 STREP A DNA AMP PROBE: CPT | Mod: RHCUB | Performed by: INTERNAL MEDICINE

## 2024-08-20 RX ORDER — BENZONATATE 200 MG/1
200 CAPSULE ORAL 2 TIMES DAILY PRN
Qty: 30 CAPSULE | Refills: 0 | Status: SHIPPED | OUTPATIENT
Start: 2024-08-20

## 2024-08-20 RX ORDER — GLIMEPIRIDE 2 MG/1
2 TABLET ORAL
Qty: 90 TABLET | Refills: 6 | Status: SHIPPED | OUTPATIENT
Start: 2024-08-20

## 2024-08-20 RX ORDER — AZITHROMYCIN 250 MG/1
TABLET, FILM COATED ORAL
Qty: 6 TABLET | Refills: 0 | Status: SHIPPED | OUTPATIENT
Start: 2024-08-20

## 2024-08-20 RX ORDER — ESZOPICLONE 3 MG/1
TABLET, FILM COATED ORAL
Qty: 30 TABLET | Refills: 1 | Status: SHIPPED | OUTPATIENT
Start: 2024-08-20

## 2024-08-20 RX ORDER — ISOSORBIDE MONONITRATE 30 MG/1
30 TABLET, EXTENDED RELEASE ORAL DAILY
Qty: 90 TABLET | Refills: 6 | Status: SHIPPED | OUTPATIENT
Start: 2024-08-20

## 2024-08-20 RX ORDER — NEOMYCIN SULFATE, POLYMYXIN B SULFATE AND HYDROCORTISONE 10; 3.5; 1 MG/ML; MG/ML; [USP'U]/ML
2 SUSPENSION/ DROPS AURICULAR (OTIC) 3 TIMES DAILY
Qty: 10 ML | Refills: 0 | Status: SHIPPED | OUTPATIENT
Start: 2024-08-20

## 2024-08-20 RX ORDER — METOPROLOL SUCCINATE 50 MG/1
50 TABLET, EXTENDED RELEASE ORAL DAILY
Qty: 90 TABLET | Refills: 6 | Status: SHIPPED | OUTPATIENT
Start: 2024-08-20

## 2024-08-20 RX ORDER — LORATADINE 10 MG/1
10 TABLET ORAL DAILY
Qty: 20 TABLET | Refills: 3 | Status: SHIPPED | OUTPATIENT
Start: 2024-08-20

## 2024-08-20 RX ORDER — FOSINOPRIL SODIUM 20 MG/1
20 TABLET ORAL DAILY
Qty: 90 TABLET | Refills: 6 | Status: SHIPPED | OUTPATIENT
Start: 2024-08-20

## 2024-08-20 RX ORDER — FERROUS SULFATE 325(65) MG
325 TABLET, DELAYED RELEASE (ENTERIC COATED) ORAL EVERY OTHER DAY
Qty: 15 TABLET | Refills: 6 | Status: SHIPPED | OUTPATIENT
Start: 2024-08-20

## 2024-08-21 ENCOUNTER — TELEPHONE (OUTPATIENT)
Dept: FAMILY MEDICINE | Facility: CLINIC | Age: 59
End: 2024-08-21
Payer: MEDICARE

## 2024-08-21 PROBLEM — H92.02 LEFT EAR PAIN: Status: ACTIVE | Noted: 2024-08-21

## 2024-08-21 NOTE — PROGRESS NOTES
Subjective:       Patient ID: Anuradha Godfrey is a 59 y.o. female.    Chief Complaint: Anemia (4 month fu ), Health Maintenance (Pt will have A1c and diabetes urine screening done today ), and Cough    HPI  .  Patient presents today complaining of hoarseness patient does have chronic laryngitis complains of a cough complains of moderate pain in the left ear.  Patient also gives a history of anemia and occasional weakness.  Patient coughing up yellowish sputum..  I suspect the patient may have a UTI.  Left ear was examined there is evidence of mild otitis externa.  Today will also address care gaps.  Patient also has chronic type 2 diabetes mellitus.    Current Medications:    Current Outpatient Medications:     acetaminophen (TYLENOL) 500 MG tablet, Take 1,000 mg by mouth every 6 (six) hours as needed for Pain., Disp: , Rfl:     amitriptyline (ELAVIL) 25 MG tablet, Take 1 tablet (25 mg total) by mouth once daily., Disp: 180 tablet, Rfl: 1    amitriptyline (ELAVIL) 50 MG tablet, Take 1 tablet (50 mg total) by mouth every evening., Disp: 90 tablet, Rfl: 0    apixaban (ELIQUIS) 2.5 mg Tab, Take 1 tablet (2.5 mg total) by mouth once daily., Disp: 30 tablet, Rfl: 5    atorvastatin (LIPITOR) 80 MG tablet, Take 1 tablet (80 mg total) by mouth once daily., Disp: 90 tablet, Rfl: 1    blood sugar diagnostic Strp, 1 strip by Misc.(Non-Drug; Combo Route) route 3 (three) times daily., Disp: 200 strip, Rfl: 6    clopidogreL (PLAVIX) 75 mg tablet, Take 1 tablet (75 mg total) by mouth once daily., Disp: 90 tablet, Rfl: 1    cycloSPORINE (RESTASIS) 0.05 % ophthalmic emulsion, PLACE 1 DROP INTO BOTH EYES TWICE DAILY, Disp: 60 each, Rfl: 3    dexlansoprazole (DEXILANT) 60 mg capsule, Take 1 capsule (60 mg total) by mouth once daily., Disp: 90 capsule, Rfl: 1    diclofenac sodium (VOLTAREN) 1 % Gel, Apply topically., Disp: , Rfl:     docusate sodium (COLACE) 50 MG capsule, Take 50 mg by mouth once daily., Disp: , Rfl:      HYDROcodone-acetaminophen (NORCO)  mg per tablet, TAKE 1 TABLET BY MOUTH 4 TIMES DAILY AS NEEDED ..MAY CAUSE DROWSINESS- AVOID ALCOHOL, Disp: , Rfl:     LANTUS SOLOSTAR U-100 INSULIN glargine 100 units/mL SubQ pen, INJECT INJECT 35 UNITS UNDER THE SKIN EVERY NIGHT AT BEDTIME, Disp: 12 mL, Rfl: 4    methocarbamoL (ROBAXIN) 750 MG Tab, TAKE 1 TABLET BY MOUTH 3 TIMES DAILY WITH FOOD AS NEEDED ..MAY CAUSE DROWSINESS- AVOID ALCOHOL, Disp: , Rfl:     morphine (MS CONTIN) 15 MG 12 hr tablet, TAKE 1 TABLET BY MOUTH EACH NIGHT AT BEDTIME ..MAY CAUSE DROWSINESS- AVOID ALCOHOL, Disp: , Rfl:     nitroGLYCERIN (NITROSTAT) 0.4 MG SL tablet, DISSOLVE 1 TABLET UNDER TONGUE AS NEEDED FOR CHEST PAIN EVERY 5 MINUTES ..NO MORE THAN 3 TABLETS AT A TIME SEEK MEDICAL ATTENTION IF NO RELIEF, Disp: 25 tablet, Rfl: 2    NOVOLOG MIX 70-30FLEXPEN U-100 100 unit/mL (70-30) InPn pen, Inject 20 Units into the skin 2 (two) times a day., Disp: 12 mL, Rfl: 12    pioglitazone (ACTOS) 15 MG tablet, TAKE 1 TABLET BY MOUTH ONCE DAILY, Disp: 90 tablet, Rfl: 1    pregabalin (LYRICA) 200 MG Cap, TAKE 1 CAPSULE BY MOUTH 3 TIMES DAILY ..MAY CAUSE DROWSINESS- AVOID ALCOHOL, Disp: 90 capsule, Rfl: 0    promethazine (PHENERGAN) 50 MG tablet, TAKE 1 TABLET BY MOUTH EVERY 4-6 HOURS AS NEEDED FOR NAUSEA *AVOID ALCOHOL *CAUSES DROWSINESS*, Disp: 60 tablet, Rfl: 1    ubrogepant (UBRELVY) 100 mg tablet, Take 1 tablet (100 mg total) by mouth as needed for Migraine. If symptoms persist or return, may repeat dose after 2 hours. Maximum: 200 mg per 24 hours, Disp: 8 tablet, Rfl: 5    azithromycin (Z-COURTNEY) 250 MG tablet, Take 2 tablets by mouth on day 1; Take 1 tablet by mouth on days 2-5, Disp: 6 tablet, Rfl: 0    benzonatate (TESSALON) 200 MG capsule, Take 1 capsule (200 mg total) by mouth 2 (two) times daily as needed for Cough., Disp: 30 capsule, Rfl: 0    esomeprazole (NEXIUM) 40 MG capsule, TAKE 1 CAPSULE BY MOUTH EACH MORNING BEFORE BREAKFAST (Patient not  "taking: Reported on 4/30/2024), Disp: 30 capsule, Rfl: 10    eszopiclone (LUNESTA) 3 mg Tab, TAKE 1 TABLET BY MOUTH EVERY NIGHT AT BEDTIME AS NEEDED FOR SLEEP *AVOID ALCOHOL *CAUSES DROWSINESS*, Disp: 30 tablet, Rfl: 1    ferrous sulfate 325 (65 FE) MG EC tablet, Take 1 tablet (325 mg total) by mouth every other day., Disp: 15 tablet, Rfl: 6    fosinopriL (MONOPRIL) 20 MG tablet, Take 1 tablet (20 mg total) by mouth once daily., Disp: 90 tablet, Rfl: 6    glimepiride (AMARYL) 2 MG tablet, Take 1 tablet (2 mg total) by mouth daily with breakfast., Disp: 90 tablet, Rfl: 6    isosorbide mononitrate (IMDUR) 30 MG 24 hr tablet, Take 1 tablet (30 mg total) by mouth once daily., Disp: 90 tablet, Rfl: 6    loratadine (CLARITIN) 10 mg tablet, Take 1 tablet (10 mg total) by mouth once daily., Disp: 20 tablet, Rfl: 3    metoprolol succinate (TOPROL-XL) 50 MG 24 hr tablet, Take 1 tablet (50 mg total) by mouth once daily., Disp: 90 tablet, Rfl: 6    neomycin-polymyxin-hydrocortisone (CORTISPORIN) 3.5-10,000-1 mg/mL-unit/mL-% otic suspension, Place 2 drops into the left ear 3 (three) times daily., Disp: 10 mL, Rfl: 0           ROS  Twelve point system reviewed, unremarkable except for stated above in HPI.        Objective:         Vitals:    08/20/24 0951   BP: 127/79   BP Location: Right arm   Patient Position: Sitting   BP Method: Large (Automatic)   Pulse: 85   Resp: 18   Temp: 97.8 °F (36.6 °C)   TempSrc: Tympanic   SpO2: 96%   Weight: 91.6 kg (202 lb)   Height: 5' 7" (1.702 m)        Physical Exam     Patient is awake alert oriented person place and  Lungs are clear to auscultation bilaterally no crackles or wheezes   Cardiovascular S1-S2 regular rate and rhythm no murmurs rubs or gallops   Abdomen is soft positive bowel sounds nontender, extremities no clubbing cyanosis edema  Neuro no focal neurological deficits  Skin warm and dry.     Last Labs:     Office Visit on 08/20/2024   Component Date Value    Molecular Strep A, " POC 08/20/2024 Negative      Acceptab* 08/20/2024 Yes     POC Rapid COVID 08/20/2024 Negative      Acceptab* 08/20/2024 Yes     POC Molecular Influenza * 08/20/2024 Negative     POC Molecular Influenza * 08/20/2024 Negative      Acceptab* 08/20/2024 Yes     Hemoglobin A1C 08/20/2024 8.1 (H)     Estimated Average Glucose 08/20/2024 186     Iron 08/20/2024 72     Ferritin 08/20/2024 131     WBC 08/20/2024 6.78     RBC 08/20/2024 4.47     Hemoglobin 08/20/2024 13.0     Hematocrit 08/20/2024 41.9     MCV 08/20/2024 93.7     MCH 08/20/2024 29.1     MCHC 08/20/2024 31.0 (L)     RDW 08/20/2024 16.2 (H)     Platelet Count 08/20/2024 267     MPV 08/20/2024 11.2     Neutrophils % 08/20/2024 54.1     Lymphocytes % 08/20/2024 30.4     Monocytes % 08/20/2024 9.4 (H)     Eosinophils % 08/20/2024 5.0 (H)     Basophils % 08/20/2024 0.7     Immature Granulocytes % 08/20/2024 0.4     nRBC, Auto 08/20/2024 0.0     Neutrophils, Abs 08/20/2024 3.66     Lymphocytes, Absolute 08/20/2024 2.06     Monocytes, Absolute 08/20/2024 0.64     Eosinophils, Absolute 08/20/2024 0.34     Basophils, Absolute 08/20/2024 0.05     Immature Granulocytes, A* 08/20/2024 0.03     nRBC, Absolute 08/20/2024 0.00     Diff Type 08/20/2024 Auto        Last Imaging:  Mammo Digital Screening Bilat w/ [place]  Narrative: Result:   Mammo Digital Screening Bilat w/ [place]     History:  Patient is 58 y.o. and is seen for a screening mammogram.    Films Compared:  Compared to: 11/29/2022 Mammo Digital Screening Bilat, 11/18/2021 Mammo   Digital Screening Bilat, and 05/12/2021 US Breast Left Limited     Findings:  This procedure was performed using tomosynthesis. Computer-aided detection   was utilized in the interpretation of this examination.  The breasts have scattered areas of fibroglandular density. There is no   evidence of suspicious masses, calcifications, or other abnormal findings.  Impression: Bilateral  There is no  mammographic evidence of malignancy.    BI-RADS Category:   Overall: 1 - Negative       Recommendation:  Routine screening mammogram in 1 year is recommended.    Your estimated lifetime risk of breast cancer (to age 85) based on   Tyrer-Cuzick risk assessment model is Tyrer-Cuzick: 5.33%. According to   the [employer] Society, patients with a lifetime breast cancer risk   of 20% or higher might benefit from supplemental screening tests.         **Labs and x-rays personally reviewed by me    ** reviewed           Assessment & Plan:       1. Cough, unspecified type  -     POCT Strep A, Molecular  -     POCT COVID-19 Rapid Screening  -     POCT Influenza A/B Molecular  -     glimepiride (AMARYL) 2 MG tablet; Take 1 tablet (2 mg total) by mouth daily with breakfast.  Dispense: 90 tablet; Refill: 6  -     metoprolol succinate (TOPROL-XL) 50 MG 24 hr tablet; Take 1 tablet (50 mg total) by mouth once daily.  Dispense: 90 tablet; Refill: 6    2. Type 2 diabetes mellitus with other specified complication, without long-term current use of insulin  -     Hemoglobin A1C; Future; Expected date: 08/20/2024  -     Protein, Random Urine; Future; Expected date: 08/20/2024  -     Microalbumin/Creatinine Ratio, Urine; Future; Expected date: 08/20/2024  -     CBC Auto Differential; Future; Expected date: 08/20/2024  -     glimepiride (AMARYL) 2 MG tablet; Take 1 tablet (2 mg total) by mouth daily with breakfast.  Dispense: 90 tablet; Refill: 6  -     metoprolol succinate (TOPROL-XL) 50 MG 24 hr tablet; Take 1 tablet (50 mg total) by mouth once daily.  Dispense: 90 tablet; Refill: 6    3. Anemia, unspecified type  -     Iron; Future; Expected date: 08/20/2024  -     Ferritin; Future; Expected date: 08/20/2024    4. Left ear pain  -     Ambulatory referral/consult to ENT; Future; Expected date: 08/27/2024    Other orders  -     eszopiclone (LUNESTA) 3 mg Tab; TAKE 1 TABLET BY MOUTH EVERY NIGHT AT BEDTIME AS NEEDED FOR SLEEP  *AVOID ALCOHOL *CAUSES DROWSINESS*  Dispense: 30 tablet; Refill: 1  -     ferrous sulfate 325 (65 FE) MG EC tablet; Take 1 tablet (325 mg total) by mouth every other day.  Dispense: 15 tablet; Refill: 6  -     fosinopriL (MONOPRIL) 20 MG tablet; Take 1 tablet (20 mg total) by mouth once daily.  Dispense: 90 tablet; Refill: 6  -     isosorbide mononitrate (IMDUR) 30 MG 24 hr tablet; Take 1 tablet (30 mg total) by mouth once daily.  Dispense: 90 tablet; Refill: 6  -     azithromycin (Z-COURTNEY) 250 MG tablet; Take 2 tablets by mouth on day 1; Take 1 tablet by mouth on days 2-5  Dispense: 6 tablet; Refill: 0  -     loratadine (CLARITIN) 10 mg tablet; Take 1 tablet (10 mg total) by mouth once daily.  Dispense: 20 tablet; Refill: 3  -     benzonatate (TESSALON) 200 MG capsule; Take 1 capsule (200 mg total) by mouth 2 (two) times daily as needed for Cough.  Dispense: 30 capsule; Refill: 0  -     neomycin-polymyxin-hydrocortisone (CORTISPORIN) 3.5-10,000-1 mg/mL-unit/mL-% otic suspension; Place 2 drops into the left ear 3 (three) times daily.  Dispense: 10 mL; Refill: 0            Munir Garcia MD

## 2024-08-21 NOTE — TELEPHONE ENCOUNTER
----- Message from Munir Garcia MD sent at 8/21/2024 12:16 AM CDT -----  Need to see in  1 week please  abnl results     1530 Pt is scheduled for 09/11/24

## 2024-08-26 RX ORDER — INSULIN GLARGINE 100 [IU]/ML
INJECTION, SOLUTION SUBCUTANEOUS
Qty: 12 ML | Refills: 4 | Status: SHIPPED | OUTPATIENT
Start: 2024-08-26

## 2024-09-11 ENCOUNTER — OFFICE VISIT (OUTPATIENT)
Dept: GASTROENTEROLOGY | Facility: CLINIC | Age: 59
End: 2024-09-11
Payer: MEDICARE

## 2024-09-11 ENCOUNTER — OFFICE VISIT (OUTPATIENT)
Dept: NEUROLOGY | Facility: CLINIC | Age: 59
End: 2024-09-11
Payer: MEDICARE

## 2024-09-11 VITALS
BODY MASS INDEX: 32.8 KG/M2 | HEIGHT: 67 IN | HEART RATE: 83 BPM | OXYGEN SATURATION: 97 % | WEIGHT: 209 LBS | DIASTOLIC BLOOD PRESSURE: 73 MMHG | SYSTOLIC BLOOD PRESSURE: 166 MMHG | RESPIRATION RATE: 18 BRPM

## 2024-09-11 VITALS
BODY MASS INDEX: 31.71 KG/M2 | DIASTOLIC BLOOD PRESSURE: 79 MMHG | RESPIRATION RATE: 96 BRPM | WEIGHT: 202 LBS | HEIGHT: 67 IN | HEART RATE: 95 BPM | SYSTOLIC BLOOD PRESSURE: 178 MMHG

## 2024-09-11 DIAGNOSIS — G43.719 INTRACTABLE CHRONIC MIGRAINE WITHOUT AURA AND WITHOUT STATUS MIGRAINOSUS: Primary | ICD-10-CM

## 2024-09-11 DIAGNOSIS — K21.9 GASTROESOPHAGEAL REFLUX DISEASE, UNSPECIFIED WHETHER ESOPHAGITIS PRESENT: Primary | ICD-10-CM

## 2024-09-11 DIAGNOSIS — K59.00 CONSTIPATION, UNSPECIFIED CONSTIPATION TYPE: ICD-10-CM

## 2024-09-11 PROCEDURE — 99999 PR PBB SHADOW E&M-EST. PATIENT-LVL V: CPT | Mod: PBBFAC,,, | Performed by: NURSE PRACTITIONER

## 2024-09-11 PROCEDURE — 99214 OFFICE O/P EST MOD 30 MIN: CPT | Mod: S$PBB,,, | Performed by: NURSE PRACTITIONER

## 2024-09-11 PROCEDURE — 99215 OFFICE O/P EST HI 40 MIN: CPT | Mod: PBBFAC | Performed by: NURSE PRACTITIONER

## 2024-09-11 PROCEDURE — 99212 OFFICE O/P EST SF 10 MIN: CPT | Mod: S$PBB,,, | Performed by: NURSE PRACTITIONER

## 2024-09-11 RX ORDER — DEXLANSOPRAZOLE 60 MG/1
60 CAPSULE, DELAYED RELEASE ORAL DAILY
Qty: 90 CAPSULE | Refills: 1 | Status: SHIPPED | OUTPATIENT
Start: 2024-09-11 | End: 2025-03-10

## 2024-09-11 NOTE — PATIENT INSTRUCTIONS
Miralax powder one capful daily in 8 ounces of liquid  Kiwi 2 per day    Avoid spicy, greasy foods  Avoid caffeine, citric acid, chocolate, peppermint, and carbonated drinks  Do not lay down within 3 hours of eating  Increase fluid to 64 ounces daily  Avoid antiinflammatory medications such as motrin, advil, aleve, ibuprofen, and BC powder

## 2024-09-11 NOTE — PROGRESS NOTES
Anuradha Godfrey is a 59 y.o. female here for Abdominal Pain (Upper center)        PCP: Munir Garcia  Referring Provider: No referring provider defined for this encounter.     HPI:  Presents with report of GERD.  Patient states that reflux and heartburn have increased.  Worse after eating.  EGD dilation on 03/21/2024, gastritis.  The patient has failed H2 blockers, omeprazole, pantoprazole, Prevacid, and Nexium.  Reports that the only PPI that has been effective for her symptoms is Dexilant.  She was previously taking the generic form and this was effective.  Reports that she has modified her diet.  Is avoiding all carbonated drinks and citric acid.  Has been advised to sit up for 3 hours after eating.  She is diabetic.  Denies hematochezia or melena.  Endorses constipation and abdominal bloating. Recommend MiraLax powder 1 capful 1-2 times daily.  Patient has a left above-the-knee amputee.  Mobility is decreased.  She does use a wheelchair.  Labs from 08/20 reviewed, ferritin 131, iron 72, HGB 13 and HCT 41.9.  Patient is followed by Dr. Pitts for previous anemia.  Records are not available for review.  Last colonoscopy was 03/22/2024, tubular adenoma removed with recommendation to repeat in 5 years.    Abdominal Pain  Associated symptoms include constipation. Pertinent negatives include no diarrhea, fever, nausea or vomiting.         ROS:  Review of Systems   Constitutional:  Negative for appetite change, fatigue, fever and unexpected weight change.   HENT:  Negative for trouble swallowing.    Cardiovascular:  Negative for chest pain.   Gastrointestinal:  Positive for abdominal pain, constipation and reflux. Negative for blood in stool, change in bowel habit, diarrhea, nausea, vomiting and fecal incontinence.   Musculoskeletal:  Positive for gait problem. Negative for joint swelling.   Integumentary:  Negative for pallor.   Psychiatric/Behavioral:  The patient is not nervous/anxious.           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    PSHX:  has a past surgical history that includes Cardiac catheterization (02/04/2021); Appendectomy (Right); Cholecystectomy; Total abdominal hysterectomy; Oophorectomy; and Breast biopsy.    PFHX: family history includes Heart disease in her brother and mother; Stroke in her mother.    PSlHX:  reports that she has never smoked. She has never used smokeless tobacco. She reports that she does not currently use alcohol. She reports that she does not use drugs.        Review of patient's allergies indicates:   Allergen Reactions    Bee sting [allergen ext-venom-honey bee] Anaphylaxis    Nsaids (non-steroidal anti-inflammatory drug) Anaphylaxis    Grass pollen-alyssa, standard     Neurontin [gabapentin] Hallucinations    Topiramate      Other reaction(s): Unknown       Medication List with Changes/Refills   Current Medications    ACETAMINOPHEN (TYLENOL) 500 MG TABLET    Take 1,000 mg by mouth every 6 (six) hours as needed for Pain.    AMITRIPTYLINE (ELAVIL) 25 MG TABLET     Take 1 tablet (25 mg total) by mouth once daily.    AMITRIPTYLINE (ELAVIL) 50 MG TABLET    Take 1 tablet (50 mg total) by mouth every evening.    ATORVASTATIN (LIPITOR) 80 MG TABLET    Take 1 tablet (80 mg total) by mouth once daily.    BLOOD SUGAR DIAGNOSTIC STRP    1 strip by Misc.(Non-Drug; Combo Route) route 3 (three) times daily.    CLOPIDOGREL (PLAVIX) 75 MG TABLET    Take 1 tablet (75 mg total) by mouth once daily.    CYCLOSPORINE (RESTASIS) 0.05 % OPHTHALMIC EMULSION    PLACE 1 DROP INTO BOTH EYES TWICE DAILY    DICLOFENAC SODIUM (VOLTAREN) 1 % GEL    Apply topically.    DOCUSATE SODIUM (COLACE) 50 MG CAPSULE    Take 50 mg by mouth once daily.    ESZOPICLONE (LUNESTA) 3 MG TAB    TAKE 1 TABLET BY MOUTH EVERY NIGHT AT BEDTIME AS NEEDED FOR SLEEP *AVOID ALCOHOL *CAUSES DROWSINESS*    FOSINOPRIL (MONOPRIL) 20 MG TABLET    Take 1 tablet (20 mg total) by mouth once daily.    GLIMEPIRIDE (AMARYL) 2 MG TABLET    Take 1 tablet (2 mg total) by mouth daily with breakfast.    HYDROCODONE-ACETAMINOPHEN (NORCO)  MG PER TABLET    TAKE 1 TABLET BY MOUTH 4 TIMES DAILY AS NEEDED ..MAY CAUSE DROWSINESS- AVOID ALCOHOL    ISOSORBIDE MONONITRATE (IMDUR) 30 MG 24 HR TABLET    Take 1 tablet (30 mg total) by mouth once daily.    LANTUS SOLOSTAR U-100 INSULIN 100 UNIT/ML (3 ML) INPN PEN    INJECT INJECT 35 UNITS UNDER THE SKIN EVERY NIGHT AT BEDTIME    LORATADINE (CLARITIN) 10 MG TABLET    Take 1 tablet (10 mg total) by mouth once daily.    METHOCARBAMOL (ROBAXIN) 750 MG TAB    TAKE 1 TABLET BY MOUTH 3 TIMES DAILY WITH FOOD AS NEEDED ..MAY CAUSE DROWSINESS- AVOID ALCOHOL    METOPROLOL SUCCINATE (TOPROL-XL) 50 MG 24 HR TABLET    Take 1 tablet (50 mg total) by mouth once daily.    MORPHINE (MS CONTIN) 15 MG 12 HR TABLET    TAKE 1 TABLET BY MOUTH EACH NIGHT AT BEDTIME ..MAY CAUSE DROWSINESS- AVOID ALCOHOL    NEOMYCIN-POLYMYXIN-HYDROCORTISONE (CORTISPORIN) 3.5-10,000-1 MG/ML-UNIT/ML-% OTIC SUSPENSION    Place 2 drops into the left  "ear 3 (three) times daily.    NITROGLYCERIN (NITROSTAT) 0.4 MG SL TABLET    DISSOLVE 1 TABLET UNDER TONGUE AS NEEDED FOR CHEST PAIN EVERY 5 MINUTES ..NO MORE THAN 3 TABLETS AT A TIME SEEK MEDICAL ATTENTION IF NO RELIEF    NOVOLOG MIX 70-30FLEXPEN U-100 100 UNIT/ML (70-30) INPN PEN    Inject 20 Units into the skin 2 (two) times a day.    PIOGLITAZONE (ACTOS) 15 MG TABLET    TAKE 1 TABLET BY MOUTH ONCE DAILY    PREGABALIN (LYRICA) 200 MG CAP    TAKE 1 CAPSULE BY MOUTH 3 TIMES DAILY ..MAY CAUSE DROWSINESS- AVOID ALCOHOL    PROMETHAZINE (PHENERGAN) 50 MG TABLET    TAKE 1 TABLET BY MOUTH EVERY 4-6 HOURS AS NEEDED FOR NAUSEA *AVOID ALCOHOL *CAUSES DROWSINESS*    UBROGEPANT (UBRELVY) 100 MG TABLET    Take 1 tablet (100 mg total) by mouth as needed for Migraine. If symptoms persist or return, may repeat dose after 2 hours. Maximum: 200 mg per 24 hours   Changed and/or Refilled Medications    Modified Medication Previous Medication    DEXLANSOPRAZOLE (DEXILANT) 60 MG CAPSULE dexlansoprazole (DEXILANT) 60 mg capsule       Take 1 capsule (60 mg total) by mouth once daily.    Take 1 capsule (60 mg total) by mouth once daily.   Discontinued Medications    APIXABAN (ELIQUIS) 2.5 MG TAB    Take 1 tablet (2.5 mg total) by mouth once daily.    AZITHROMYCIN (Z-COURTNEY) 250 MG TABLET    Take 2 tablets by mouth on day 1; Take 1 tablet by mouth on days 2-5    BENZONATATE (TESSALON) 200 MG CAPSULE    Take 1 capsule (200 mg total) by mouth 2 (two) times daily as needed for Cough.    ESOMEPRAZOLE (NEXIUM) 40 MG CAPSULE    TAKE 1 CAPSULE BY MOUTH EACH MORNING BEFORE BREAKFAST    FERROUS SULFATE 325 (65 FE) MG EC TABLET    Take 1 tablet (325 mg total) by mouth every other day.        Objective Findings:  Vital Signs:  BP (!) 178/79   Pulse 95   Resp (!) 96   Ht 5' 7" (1.702 m)   Wt 91.6 kg (202 lb)   BMI 31.64 kg/m²  Body mass index is 31.64 kg/m².    Physical Exam:  Physical Exam  Vitals and nursing note reviewed.   Constitutional:       " General: She is not in acute distress.     Appearance: Normal appearance. She is obese.   HENT:      Mouth/Throat:      Mouth: Mucous membranes are moist.   Cardiovascular:      Rate and Rhythm: Normal rate.   Pulmonary:      Effort: Pulmonary effort is normal.      Breath sounds: No wheezing, rhonchi or rales.   Abdominal:      General: Bowel sounds are normal. There is no distension.      Palpations: Abdomen is soft. There is no mass.      Tenderness: There is no abdominal tenderness.      Hernia: No hernia is present.   Musculoskeletal:      Left Lower Extremity: Left leg is amputated above knee.   Skin:     General: Skin is warm and dry.      Coloration: Skin is not jaundiced or pale.   Neurological:      Mental Status: She is alert and oriented to person, place, and time.      Gait: Gait abnormal.   Psychiatric:         Mood and Affect: Mood normal.          Labs:  Lab Results   Component Value Date    WBC 6.78 08/20/2024    HGB 13.0 08/20/2024    HCT 41.9 08/20/2024    MCV 93.7 08/20/2024    RDW 16.2 (H) 08/20/2024     08/20/2024    LYMPH 30.4 08/20/2024    LYMPH 2.06 08/20/2024    MONO 9.4 (H) 08/20/2024    EOS 0.34 08/20/2024    BASO 0.05 08/20/2024     Lab Results   Component Value Date     04/30/2024    K 3.7 04/30/2024     (H) 04/30/2024    CO2 24 04/30/2024     (H) 04/30/2024    BUN 9 04/30/2024    CREATININE 0.51 (L) 04/30/2024    CALCIUM 9.3 04/30/2024    PROT 7.2 03/20/2024    ALBUMIN 2.8 (L) 03/20/2024    BILITOT 0.3 03/20/2024    ALKPHOS 103 03/20/2024    AST 22 03/20/2024    ALT 18 03/20/2024         Imaging: No results found.      Assessment:  Anuradha Godfrey is a 59 y.o. female here with:  1. Gastroesophageal reflux disease, unspecified whether esophagitis present    2. Constipation, unspecified constipation type          Recommendations:  1. Dexilant 60 mg daily.  Patient has failed multiple PPIs and H2 blockers.  Avoid spicy, greasy foods  Avoid caffeine, citric acid,  chocolate, peppermint, and carbonated drinks  Do not lay down within 3 hours of eating  Increase fluid to 64 ounces daily  Avoid antiinflammatory medications such as motrin, advil, aleve, ibuprofen, and BC powder  2. MiraLax powder 17 g in 8 oz of liquid  3. Request records from Dr. Pitts      Follow up in about 6 months (around 3/11/2025).      Order summary:  Orders Placed This Encounter    dexlansoprazole (DEXILANT) 60 mg capsule       Thank you for allowing me to participate in the care of Anuradha Godfrey.      JOHN Vega

## 2024-09-11 NOTE — PATIENT INSTRUCTIONS
Continue the current elavil 75mg nightly  Continue current ubrelvy as needed  Prior failed imitrex and zomig but also with CAD which is contraindication for the triptans

## 2024-09-11 NOTE — PROGRESS NOTES
Subjective:       Patient ID: Anuradha Godfrey is a 59 y.o. female     Chief Complaint:    No chief complaint on file.       Allergies:  Bee sting [allergen ext-venom-honey bee]; Nsaids (non-steroidal anti-inflammatory drug); Grass pollen-alyssa, standard; Neurontin [gabapentin]; and Topiramate    Current Medications:    Outpatient Encounter Medications as of 9/11/2024   Medication Sig Dispense Refill    acetaminophen (TYLENOL) 500 MG tablet Take 1,000 mg by mouth every 6 (six) hours as needed for Pain.      amitriptyline (ELAVIL) 25 MG tablet Take 1 tablet (25 mg total) by mouth once daily. 180 tablet 1    amitriptyline (ELAVIL) 50 MG tablet Take 1 tablet (50 mg total) by mouth every evening. 90 tablet 0    atorvastatin (LIPITOR) 80 MG tablet Take 1 tablet (80 mg total) by mouth once daily. 90 tablet 1    blood sugar diagnostic Strp 1 strip by Misc.(Non-Drug; Combo Route) route 3 (three) times daily. 200 strip 6    clopidogreL (PLAVIX) 75 mg tablet Take 1 tablet (75 mg total) by mouth once daily. 90 tablet 1    cycloSPORINE (RESTASIS) 0.05 % ophthalmic emulsion PLACE 1 DROP INTO BOTH EYES TWICE DAILY 60 each 3    dexlansoprazole (DEXILANT) 60 mg capsule Take 1 capsule (60 mg total) by mouth once daily. 90 capsule 1    diclofenac sodium (VOLTAREN) 1 % Gel Apply topically.      docusate sodium (COLACE) 50 MG capsule Take 50 mg by mouth once daily.      eszopiclone (LUNESTA) 3 mg Tab TAKE 1 TABLET BY MOUTH EVERY NIGHT AT BEDTIME AS NEEDED FOR SLEEP *AVOID ALCOHOL *CAUSES DROWSINESS* 30 tablet 1    fosinopriL (MONOPRIL) 20 MG tablet Take 1 tablet (20 mg total) by mouth once daily. 90 tablet 6    glimepiride (AMARYL) 2 MG tablet Take 1 tablet (2 mg total) by mouth daily with breakfast. 90 tablet 6    HYDROcodone-acetaminophen (NORCO)  mg per tablet TAKE 1 TABLET BY MOUTH 4 TIMES DAILY AS NEEDED ..MAY CAUSE DROWSINESS- AVOID ALCOHOL      isosorbide mononitrate (IMDUR) 30 MG 24 hr tablet Take 1 tablet (30 mg  total) by mouth once daily. 90 tablet 6    LANTUS SOLOSTAR U-100 INSULIN 100 unit/mL (3 mL) InPn pen INJECT INJECT 35 UNITS UNDER THE SKIN EVERY NIGHT AT BEDTIME 12 mL 4    loratadine (CLARITIN) 10 mg tablet Take 1 tablet (10 mg total) by mouth once daily. 20 tablet 3    methocarbamoL (ROBAXIN) 750 MG Tab TAKE 1 TABLET BY MOUTH 3 TIMES DAILY WITH FOOD AS NEEDED ..MAY CAUSE DROWSINESS- AVOID ALCOHOL      metoprolol succinate (TOPROL-XL) 50 MG 24 hr tablet Take 1 tablet (50 mg total) by mouth once daily. 90 tablet 6    morphine (MS CONTIN) 15 MG 12 hr tablet TAKE 1 TABLET BY MOUTH EACH NIGHT AT BEDTIME ..MAY CAUSE DROWSINESS- AVOID ALCOHOL      neomycin-polymyxin-hydrocortisone (CORTISPORIN) 3.5-10,000-1 mg/mL-unit/mL-% otic suspension Place 2 drops into the left ear 3 (three) times daily. 10 mL 0    nitroGLYCERIN (NITROSTAT) 0.4 MG SL tablet DISSOLVE 1 TABLET UNDER TONGUE AS NEEDED FOR CHEST PAIN EVERY 5 MINUTES ..NO MORE THAN 3 TABLETS AT A TIME SEEK MEDICAL ATTENTION IF NO RELIEF 25 tablet 2    NOVOLOG MIX 70-30FLEXPEN U-100 100 unit/mL (70-30) InPn pen Inject 20 Units into the skin 2 (two) times a day. 12 mL 12    pioglitazone (ACTOS) 15 MG tablet TAKE 1 TABLET BY MOUTH ONCE DAILY 90 tablet 1    pregabalin (LYRICA) 200 MG Cap TAKE 1 CAPSULE BY MOUTH 3 TIMES DAILY ..MAY CAUSE DROWSINESS- AVOID ALCOHOL 90 capsule 0    promethazine (PHENERGAN) 50 MG tablet TAKE 1 TABLET BY MOUTH EVERY 4-6 HOURS AS NEEDED FOR NAUSEA *AVOID ALCOHOL *CAUSES DROWSINESS* 60 tablet 1    ubrogepant (UBRELVY) 100 mg tablet Take 1 tablet (100 mg total) by mouth as needed for Migraine. If symptoms persist or return, may repeat dose after 2 hours. Maximum: 200 mg per 24 hours 8 tablet 5    [DISCONTINUED] apixaban (ELIQUIS) 2.5 mg Tab Take 1 tablet (2.5 mg total) by mouth once daily. 30 tablet 5    [DISCONTINUED] azithromycin (Z-COURTNEY) 250 MG tablet Take 2 tablets by mouth on day 1; Take 1 tablet by mouth on days 2-5 6 tablet 0    [DISCONTINUED]  benzonatate (TESSALON) 200 MG capsule Take 1 capsule (200 mg total) by mouth 2 (two) times daily as needed for Cough. 30 capsule 0    [DISCONTINUED] dexlansoprazole (DEXILANT) 60 mg capsule Take 1 capsule (60 mg total) by mouth once daily. 90 capsule 1    [DISCONTINUED] esomeprazole (NEXIUM) 40 MG capsule TAKE 1 CAPSULE BY MOUTH EACH MORNING BEFORE BREAKFAST (Patient not taking: Reported on 4/30/2024) 30 capsule 10    [DISCONTINUED] eszopiclone (LUNESTA) 3 mg Tab TAKE 1 TABLET BY MOUTH EVERY NIGHT AT BEDTIME AS NEEDED FOR SLEEP *AVOID ALCOHOL *CAUSES DROWSINESS* 30 tablet 1    [DISCONTINUED] ferrous sulfate 325 (65 FE) MG EC tablet Take 1 tablet (325 mg total) by mouth every other day. 15 tablet 2    [DISCONTINUED] ferrous sulfate 325 (65 FE) MG EC tablet Take 1 tablet (325 mg total) by mouth every other day. 15 tablet 6    [DISCONTINUED] fosinopriL (MONOPRIL) 20 MG tablet Take 1 tablet (20 mg total) by mouth once daily. 90 tablet 3    [DISCONTINUED] glimepiride (AMARYL) 2 MG tablet Take 1 tablet (2 mg total) by mouth daily with breakfast. 90 tablet 1    [DISCONTINUED] isosorbide mononitrate (IMDUR) 30 MG 24 hr tablet Take 1 tablet (30 mg total) by mouth once daily. 90 tablet 3    [DISCONTINUED] LANTUS SOLOSTAR U-100 INSULIN glargine 100 units/mL SubQ pen INJECT INJECT 35 UNITS UNDER THE SKIN EVERY NIGHT AT BEDTIME 12 mL 4    [DISCONTINUED] metoprolol succinate (TOPROL-XL) 50 MG 24 hr tablet TAKE 1 TABLET BY MOUTH ONCE DAILY 90 tablet 1     No facility-administered encounter medications on file as of 9/11/2024.       History of Present Illness  60 y/o WF following in neurology for reported migraine headaches, neuropathy, and reported memory impairment.    Prior was on imitrex and zomig to take PRN, however had history of CAD and thus it was not recommended, actually contraindicated.  Was approved for Ubrelvy to use instead and reported it was beneficial.  I have now been able to get her back on it to use PRN and it  continues to work well for her.    Denies overuse headaches    MIDAS score currently 18    Long standing history of migraines.  She was actually seen by Dr. Galo in 2000 through 2014 for her migraines.  She was prior treated with pamelor and topamax.  She had reaction to the topamax.  Pamelor was effective but somewhere along the way was changed to Elavil currently at 75mg at that time which she is still currently on.  She is already on beta blocker metoprolol for her HTN management.  Thus this is x3 preventive treatments, but her insurance would not cover CGRP treatment for prevention.    She is poorly controlled diabetic per her report, and has LLE AKA amputation due this.     Initial MMSE was 29/30, was at that time still being evaluated for sleep apnea and was found to have sleep apnea but does not use her C-pap though we have discussed the importance of this.    She was also on multiple medications which can contribute to cognitive impairment including norco, MS contin, lyrica, and lunesta.             Review of Systems  ROS   Objective:           Physical Exam     Assessment:     Problem List Items Addressed This Visit    None         Primary Diagnosis and ICD10  No primary diagnosis found.    Plan:     Patient Instructions   Continue the current elavil 75mg nightly  Continue current ubrelvy as needed  Prior failed imitrex and zomig but also with CAD which is contraindication for the triptans    There are no discontinued medications.      Requested Prescriptions      No prescriptions requested or ordered in this encounter       No orders of the defined types were placed in this encounter.

## 2024-09-26 ENCOUNTER — HOSPITAL ENCOUNTER (OUTPATIENT)
Dept: RADIOLOGY | Facility: HOSPITAL | Age: 59
Discharge: HOME | End: 2024-09-26
Attending: RADIOLOGY
Payer: MEDICARE

## 2024-09-26 DIAGNOSIS — N64.4 BREAST PAIN, RIGHT: ICD-10-CM

## 2024-09-26 PROCEDURE — 76641 ULTRASOUND BREAST COMPLETE: CPT | Mod: 26,50,, | Performed by: RADIOLOGY

## 2024-09-26 PROCEDURE — 76641 ULTRASOUND BREAST COMPLETE: CPT | Mod: TC,50

## 2024-10-15 ENCOUNTER — TELEPHONE (OUTPATIENT)
Dept: GASTROENTEROLOGY | Facility: CLINIC | Age: 59
End: 2024-10-15
Payer: MEDICARE

## 2024-10-15 NOTE — TELEPHONE ENCOUNTER
Left detailed message that PA has been completed and sent to [facility] and once approved she should be able to  medicine at her pharmacy.  Key: [id number]            ----- Message from Anca sent at 10/15/2024 10:12 AM CDT -----  Re: dexilant , asked nurse to call, [facility] said has not received any info?

## 2024-10-27 DIAGNOSIS — E11.69 TYPE 2 DIABETES MELLITUS WITH OTHER SPECIFIED COMPLICATION, WITHOUT LONG-TERM CURRENT USE OF INSULIN: ICD-10-CM

## 2024-10-27 DIAGNOSIS — R05.9 COUGH, UNSPECIFIED TYPE: ICD-10-CM

## 2024-10-28 RX ORDER — ATORVASTATIN CALCIUM 80 MG/1
80 TABLET, FILM COATED ORAL NIGHTLY
Qty: 90 TABLET | Refills: 1 | Status: SHIPPED | OUTPATIENT
Start: 2024-10-28

## 2024-10-28 RX ORDER — GLIMEPIRIDE 2 MG/1
2 TABLET ORAL
Qty: 90 TABLET | Refills: 1 | Status: SHIPPED | OUTPATIENT
Start: 2024-10-28

## 2024-10-29 DIAGNOSIS — G47.00 INSOMNIA, UNSPECIFIED TYPE: Primary | ICD-10-CM

## 2024-10-29 RX ORDER — ESZOPICLONE 3 MG/1
TABLET, FILM COATED ORAL
Qty: 30 TABLET | Refills: 1 | Status: SHIPPED | OUTPATIENT
Start: 2024-10-29

## 2024-10-30 ENCOUNTER — TELEPHONE (OUTPATIENT)
Dept: CARDIOLOGY | Facility: CLINIC | Age: 59
End: 2024-10-30
Payer: MEDICARE

## 2024-11-01 ENCOUNTER — TELEPHONE (OUTPATIENT)
Dept: OBSTETRICS AND GYNECOLOGY | Facility: CLINIC | Age: 59
End: 2024-11-01
Payer: MEDICARE

## 2024-11-11 DIAGNOSIS — G43.719 INTRACTABLE CHRONIC MIGRAINE WITHOUT AURA AND WITHOUT STATUS MIGRAINOSUS: ICD-10-CM

## 2024-11-11 RX ORDER — UBROGEPANT 100 MG/1
100 TABLET ORAL
Qty: 8 TABLET | Refills: 5 | Status: SHIPPED | OUTPATIENT
Start: 2024-11-11

## 2024-12-03 ENCOUNTER — OFFICE VISIT (OUTPATIENT)
Dept: FAMILY MEDICINE | Facility: CLINIC | Age: 59
End: 2024-12-03
Payer: MEDICARE

## 2024-12-03 VITALS
SYSTOLIC BLOOD PRESSURE: 143 MMHG | WEIGHT: 211 LBS | OXYGEN SATURATION: 98 % | DIASTOLIC BLOOD PRESSURE: 81 MMHG | HEIGHT: 67 IN | HEART RATE: 79 BPM | RESPIRATION RATE: 18 BRPM | BODY MASS INDEX: 33.12 KG/M2 | TEMPERATURE: 97 F

## 2024-12-03 DIAGNOSIS — E11.69 TYPE 2 DIABETES MELLITUS WITH OTHER SPECIFIED COMPLICATION, WITHOUT LONG-TERM CURRENT USE OF INSULIN: ICD-10-CM

## 2024-12-03 DIAGNOSIS — J02.9 SORE THROAT: Primary | ICD-10-CM

## 2024-12-03 DIAGNOSIS — K21.9 GASTROESOPHAGEAL REFLUX DISEASE, UNSPECIFIED WHETHER ESOPHAGITIS PRESENT: ICD-10-CM

## 2024-12-03 DIAGNOSIS — G43.719 INTRACTABLE CHRONIC MIGRAINE WITHOUT AURA AND WITHOUT STATUS MIGRAINOSUS: ICD-10-CM

## 2024-12-03 DIAGNOSIS — R05.9 COUGH, UNSPECIFIED TYPE: ICD-10-CM

## 2024-12-03 LAB
BASOPHILS # BLD AUTO: 0.06 K/UL (ref 0–0.2)
BASOPHILS NFR BLD AUTO: 0.6 % (ref 0–1)
CTP QC/QA: YES
DIFFERENTIAL METHOD BLD: ABNORMAL
EOSINOPHIL # BLD AUTO: 0.21 K/UL (ref 0–0.5)
EOSINOPHIL NFR BLD AUTO: 2 % (ref 1–4)
ERYTHROCYTE [DISTWIDTH] IN BLOOD BY AUTOMATED COUNT: 14.7 % (ref 11.5–14.5)
EST. AVERAGE GLUCOSE BLD GHB EST-MCNC: 154 MG/DL
HBA1C MFR BLD HPLC: 7 %
HCT VFR BLD AUTO: 42.6 % (ref 38–47)
HGB BLD-MCNC: 13.1 G/DL (ref 12–16)
IMM GRANULOCYTES # BLD AUTO: 0.04 K/UL (ref 0–0.04)
IMM GRANULOCYTES NFR BLD: 0.4 % (ref 0–0.4)
LYMPHOCYTES # BLD AUTO: 3.35 K/UL (ref 1–4.8)
LYMPHOCYTES NFR BLD AUTO: 32.3 % (ref 27–41)
MCH RBC QN AUTO: 29.7 PG (ref 27–31)
MCHC RBC AUTO-ENTMCNC: 30.8 G/DL (ref 32–36)
MCV RBC AUTO: 96.6 FL (ref 80–96)
MOLECULAR STREP A: POSITIVE
MONOCYTES # BLD AUTO: 0.78 K/UL (ref 0–0.8)
MONOCYTES NFR BLD AUTO: 7.5 % (ref 2–6)
MPC BLD CALC-MCNC: 11.3 FL (ref 9.4–12.4)
NEUTROPHILS # BLD AUTO: 5.92 K/UL (ref 1.8–7.7)
NEUTROPHILS NFR BLD AUTO: 57.2 % (ref 53–65)
NRBC # BLD AUTO: 0 X10E3/UL
NRBC, AUTO (.00): 0 %
PLATELET # BLD AUTO: 291 K/UL (ref 150–400)
POC MOLECULAR INFLUENZA A AGN: NEGATIVE
POC MOLECULAR INFLUENZA B AGN: NEGATIVE
RBC # BLD AUTO: 4.41 M/UL (ref 4.2–5.4)
SARS-COV-2 RDRP RESP QL NAA+PROBE: NEGATIVE
WBC # BLD AUTO: 10.36 K/UL (ref 4.5–11)

## 2024-12-03 PROCEDURE — 99214 OFFICE O/P EST MOD 30 MIN: CPT | Mod: ,,, | Performed by: INTERNAL MEDICINE

## 2024-12-03 PROCEDURE — 87635 SARS-COV-2 COVID-19 AMP PRB: CPT | Mod: RHCUB | Performed by: INTERNAL MEDICINE

## 2024-12-03 PROCEDURE — 87651 STREP A DNA AMP PROBE: CPT | Mod: RHCUB | Performed by: INTERNAL MEDICINE

## 2024-12-03 PROCEDURE — 83036 HEMOGLOBIN GLYCOSYLATED A1C: CPT | Mod: ,,, | Performed by: CLINICAL MEDICAL LABORATORY

## 2024-12-03 PROCEDURE — 85025 COMPLETE CBC W/AUTO DIFF WBC: CPT | Mod: ,,, | Performed by: CLINICAL MEDICAL LABORATORY

## 2024-12-03 PROCEDURE — 87502 INFLUENZA DNA AMP PROBE: CPT | Mod: RHCUB | Performed by: INTERNAL MEDICINE

## 2024-12-03 RX ORDER — PIOGLITAZONEHYDROCHLORIDE 15 MG/1
15 TABLET ORAL DAILY
Qty: 90 TABLET | Refills: 1 | Status: SHIPPED | OUTPATIENT
Start: 2024-12-03

## 2024-12-03 RX ORDER — UBROGEPANT 100 MG/1
100 TABLET ORAL
Qty: 8 TABLET | Refills: 5 | Status: SHIPPED | OUTPATIENT
Start: 2024-12-03

## 2024-12-03 RX ORDER — ISOSORBIDE MONONITRATE 30 MG/1
30 TABLET, EXTENDED RELEASE ORAL DAILY
Qty: 90 TABLET | Refills: 6 | Status: SHIPPED | OUTPATIENT
Start: 2024-12-03

## 2024-12-03 RX ORDER — DEXLANSOPRAZOLE 60 MG/1
60 CAPSULE, DELAYED RELEASE ORAL DAILY
Qty: 90 CAPSULE | Refills: 1 | Status: SHIPPED | OUTPATIENT
Start: 2024-12-03 | End: 2025-06-01

## 2024-12-03 RX ORDER — NEOMYCIN SULFATE, POLYMYXIN B SULFATE AND HYDROCORTISONE 10; 3.5; 1 MG/ML; MG/ML; [USP'U]/ML
2 SUSPENSION/ DROPS AURICULAR (OTIC) 3 TIMES DAILY
Qty: 10 ML | Refills: 0 | Status: SHIPPED | OUTPATIENT
Start: 2024-12-03

## 2024-12-03 RX ORDER — CLOPIDOGREL BISULFATE 75 MG/1
75 TABLET ORAL DAILY
Qty: 90 TABLET | Refills: 1 | Status: SHIPPED | OUTPATIENT
Start: 2024-12-03

## 2024-12-03 RX ORDER — NITROGLYCERIN 0.4 MG/1
TABLET SUBLINGUAL
Qty: 25 TABLET | Refills: 2 | Status: SHIPPED | OUTPATIENT
Start: 2024-12-03

## 2024-12-03 RX ORDER — INSULIN GLARGINE 100 [IU]/ML
INJECTION, SOLUTION SUBCUTANEOUS
Qty: 12 ML | Refills: 4 | Status: SHIPPED | OUTPATIENT
Start: 2024-12-03

## 2024-12-03 RX ORDER — GLIMEPIRIDE 2 MG/1
2 TABLET ORAL
Qty: 90 TABLET | Refills: 1 | Status: SHIPPED | OUTPATIENT
Start: 2024-12-03

## 2024-12-03 RX ORDER — PROMETHAZINE HYDROCHLORIDE 50 MG/1
TABLET ORAL
Qty: 60 TABLET | Refills: 1 | Status: SHIPPED | OUTPATIENT
Start: 2024-12-03

## 2024-12-03 RX ORDER — AMOXICILLIN 500 MG/1
500 TABLET, FILM COATED ORAL 3 TIMES DAILY
Qty: 21 TABLET | Refills: 0 | Status: SHIPPED | OUTPATIENT
Start: 2024-12-03

## 2024-12-03 RX ORDER — FOSINOPRIL SODIUM 20 MG/1
20 TABLET ORAL DAILY
Qty: 90 TABLET | Refills: 6 | Status: SHIPPED | OUTPATIENT
Start: 2024-12-03

## 2024-12-03 RX ORDER — INSULIN ASPART 100 [IU]/ML
20 INJECTION, SUSPENSION SUBCUTANEOUS 2 TIMES DAILY
Qty: 12 ML | Refills: 12 | Status: SHIPPED | OUTPATIENT
Start: 2024-12-03

## 2024-12-03 RX ORDER — LORATADINE 10 MG/1
10 TABLET ORAL DAILY
Qty: 20 TABLET | Refills: 3 | Status: SHIPPED | OUTPATIENT
Start: 2024-12-03

## 2024-12-03 RX ORDER — METOPROLOL SUCCINATE 50 MG/1
50 TABLET, EXTENDED RELEASE ORAL DAILY
Qty: 90 TABLET | Refills: 6 | Status: SHIPPED | OUTPATIENT
Start: 2024-12-03

## 2024-12-03 RX ORDER — ATORVASTATIN CALCIUM 80 MG/1
80 TABLET, FILM COATED ORAL NIGHTLY
Qty: 90 TABLET | Refills: 1 | Status: SHIPPED | OUTPATIENT
Start: 2024-12-03

## 2024-12-03 NOTE — PROGRESS NOTES
Subjective:       Patient ID: Anuradha Godfrey is a 59 y.o. female.    Chief Complaint: Diabetes, Health Maintenance (Pt will have A1c and diabetes urine screening done today ), and Sore Throat    HPI  .  Patient complains of a sore throat.  Also complain of pain of draining from the right ear.  Right eardrum is inflamed.  She has a mild cough but she does not endorse any fever chills chest pain.  Blood pressure is not at goal today.  Patient stated she did not take her blood pressure medications    Current Medications:    Current Outpatient Medications:     acetaminophen (TYLENOL) 500 MG tablet, Take 1,000 mg by mouth every 6 (six) hours as needed for Pain., Disp: , Rfl:     amitriptyline (ELAVIL) 25 MG tablet, Take 1 tablet (25 mg total) by mouth once daily., Disp: 180 tablet, Rfl: 1    amitriptyline (ELAVIL) 50 MG tablet, Take 1 tablet (50 mg total) by mouth every evening., Disp: 90 tablet, Rfl: 0    cycloSPORINE (RESTASIS) 0.05 % ophthalmic emulsion, PLACE 1 DROP INTO BOTH EYES TWICE DAILY, Disp: 60 each, Rfl: 3    diclofenac sodium (VOLTAREN) 1 % Gel, Apply topically., Disp: , Rfl:     docusate sodium (COLACE) 50 MG capsule, Take 50 mg by mouth once daily., Disp: , Rfl:     eszopiclone (LUNESTA) 3 mg Tab, TAKE 1 TABLET BY MOUTH EVERY NIGHT AT BEDTIME AS NEEDED FOR REST ..NO ALCOHOL WHILE TAKING MEDICATION, Disp: 30 tablet, Rfl: 1    HYDROcodone-acetaminophen (NORCO)  mg per tablet, TAKE 1 TABLET BY MOUTH 4 TIMES DAILY AS NEEDED ..MAY CAUSE DROWSINESS- AVOID ALCOHOL, Disp: , Rfl:     methocarbamoL (ROBAXIN) 750 MG Tab, TAKE 1 TABLET BY MOUTH 3 TIMES DAILY WITH FOOD AS NEEDED ..MAY CAUSE DROWSINESS- AVOID ALCOHOL, Disp: , Rfl:     morphine (MS CONTIN) 15 MG 12 hr tablet, TAKE 1 TABLET BY MOUTH EACH NIGHT AT BEDTIME ..MAY CAUSE DROWSINESS- AVOID ALCOHOL, Disp: , Rfl:     pregabalin (LYRICA) 200 MG Cap, TAKE 1 CAPSULE BY MOUTH 3 TIMES DAILY ..MAY CAUSE DROWSINESS- AVOID ALCOHOL, Disp: 90 capsule, Rfl: 0     amoxicillin (AMOXIL) 500 MG Tab, Take 1 tablet (500 mg total) by mouth 3 (three) times daily., Disp: 21 tablet, Rfl: 0    atorvastatin (LIPITOR) 80 MG tablet, Take 1 tablet (80 mg total) by mouth every evening., Disp: 90 tablet, Rfl: 1    blood sugar diagnostic Strp, 1 strip by Misc.(Non-Drug; Combo Route) route 3 (three) times daily., Disp: 200 strip, Rfl: 6    clopidogreL (PLAVIX) 75 mg tablet, Take 1 tablet (75 mg total) by mouth once daily., Disp: 90 tablet, Rfl: 1    dexlansoprazole (DEXILANT) 60 mg capsule, Take 1 capsule (60 mg total) by mouth once daily., Disp: 90 capsule, Rfl: 1    fosinopriL (MONOPRIL) 20 MG tablet, Take 1 tablet (20 mg total) by mouth once daily., Disp: 90 tablet, Rfl: 6    glimepiride (AMARYL) 2 MG tablet, Take 1 tablet (2 mg total) by mouth daily with breakfast., Disp: 90 tablet, Rfl: 1    isosorbide mononitrate (IMDUR) 30 MG 24 hr tablet, Take 1 tablet (30 mg total) by mouth once daily., Disp: 90 tablet, Rfl: 6    LANTUS SOLOSTAR U-100 INSULIN 100 unit/mL (3 mL) InPn pen, INJECT INJECT 35 UNITS UNDER THE SKIN EVERY NIGHT AT BEDTIME, Disp: 12 mL, Rfl: 4    loratadine (CLARITIN) 10 mg tablet, Take 1 tablet (10 mg total) by mouth once daily., Disp: 20 tablet, Rfl: 3    metoprolol succinate (TOPROL-XL) 50 MG 24 hr tablet, Take 1 tablet (50 mg total) by mouth once daily., Disp: 90 tablet, Rfl: 6    neomycin-polymyxin-hydrocortisone (CORTISPORIN) 3.5-10,000-1 mg/mL-unit/mL-% otic suspension, Place 2 drops into the left ear 3 (three) times daily., Disp: 10 mL, Rfl: 0    nitroGLYCERIN (NITROSTAT) 0.4 MG SL tablet, DISSOLVE 1 TABLET UNDER TONGUE AS NEEDED FOR CHEST PAIN EVERY 5 MINUTES ..NO MORE THAN 3 TABLETS AT A TIME SEEK MEDICAL ATTENTION IF NO RELIEF, Disp: 25 tablet, Rfl: 2    NOVOLOG MIX 70-30FLEXPEN U-100 100 unit/mL (70-30) InPn pen, Inject 20 Units into the skin 2 (two) times a day., Disp: 12 mL, Rfl: 12    pioglitazone (ACTOS) 15 MG tablet, Take 1 tablet (15 mg total) by mouth once  "daily., Disp: 90 tablet, Rfl: 1    promethazine (PHENERGAN) 50 MG tablet, TAKE 1 TABLET BY MOUTH EVERY 4-6 HOURS AS NEEDED FOR NAUSEA *AVOID ALCOHOL *CAUSES DROWSINESS*, Disp: 60 tablet, Rfl: 1    ubrogepant (UBRELVY) 100 mg tablet, Take 1 tablet (100 mg total) by mouth as needed for Migraine. If symptoms persist or return, may repeat dose after 2 hours. Maximum: 200 mg per 24 hours, Disp: 8 tablet, Rfl: 5           ROS  Twelve point system reviewed, unremarkable except for stated above in HPI.        Objective:         Vitals:    12/03/24 0854 12/03/24 0855   BP: (!) 162/78 (!) 143/81   BP Location: Right arm    Patient Position: Sitting    Pulse: 79    Resp: 18    Temp: 97.4 °F (36.3 °C)    TempSrc: Tympanic    SpO2: 98%    Weight: 95.7 kg (211 lb)    Height: 5' 7" (1.702 m)         Physical Exam     Patient is awake alert oriented person place and  Lungs are clear to auscultation bilaterally no crackles or wheezes   Cardiovascular S1-S2 regular rate and rhythm no murmurs rubs or gallops   Abdomen is soft positive bowel sounds nontender, extremities no clubbing cyanosis edema  Neuro no focal neurological deficits  Skin warm and dry.     Last Labs:     Office Visit on 12/03/2024   Component Date Value    Molecular Strep A, POC 12/03/2024 Positive (A)      Acceptab* 12/03/2024 Yes     POC Rapid COVID 12/03/2024 Negative      Acceptab* 12/03/2024 Yes     POC Molecular Influenza * 12/03/2024 Negative     POC Molecular Influenza * 12/03/2024 Negative      Acceptab* 12/03/2024 Yes        Last Imaging:  US Breast Bilateral Complete  Narrative: Result:   US Breast Bilateral Complete     History:  Patient is 59 y.o. and is seen for breast pain, right.  Right breast pain     Films Compared:  Compared to: 05/01/2024 Mammo Digital Screening Bilat w/ [place], 11/29/2022   Mammo Digital Screening Bilat, and 11/18/2021 Mammo Digital Screening   Bilat     Findings:  The most recent " screening mammograms dated 05/01/2024 were reviewed which   revealed scattered fibroglandular densities and no focal lesions.     The patient presents with a history of exquisite tenderness in  the 12:00   and 9 o'clock position of right breast.  This has been present   approximately 2 weeks.  She denies seeing any erythema.  A breast   examination was performed in conjunction with breast ultrasound.  All four   breast quadrants and the retroareolar regions were scanned.  The   background tissue is homogeneous - fibroglandular.     No discrete masses were palpable.  No erythema or edema is present.    Ultrasound of the right breast revealed normal fibroglandular tissues in   the 12:00, 10:00 and 9:00 positions.  No abnormalities are present in the   retroareolar region.  No abnormalities are present in the right axilla or   elsewhere.     Ultrasound of the left breast was also normal.     The patient is being treated for chronic pain and she states that she has   had cervical spine fusion.  The nature of her breast pain could be related   to referred pain from her neck or right shoulder.  Impression: There are no clinical signs of infection and no sonographic lesions are   seen.      BI-RADS Category 1: Negative Finding     Recommendation:  Routine screening mammogram in 1 year is recommended.    Your estimated lifetime risk of breast cancer (to age 85) based on   Tyrer-Cuzick risk assessment model is Tyrer-Cuzick: 5.33%. According to   the American Cancer Society, patients with a lifetime breast cancer risk   of 20% or higher might benefit from supplemental screening tests.         **Labs and x-rays personally reviewed by me    ** reviewed           Assessment & Plan:       1. Sore throat  -     POCT Strep A, Molecular  -     POCT COVID-19 Rapid Screening  -     POCT Influenza A/B Molecular    2. Cough, unspecified type  -     atorvastatin (LIPITOR) 80 MG tablet; Take 1 tablet (80 mg total) by mouth every  evening.  Dispense: 90 tablet; Refill: 1  -     blood sugar diagnostic Strp; 1 strip by Misc.(Non-Drug; Combo Route) route 3 (three) times daily.  Dispense: 200 strip; Refill: 6  -     clopidogreL (PLAVIX) 75 mg tablet; Take 1 tablet (75 mg total) by mouth once daily.  Dispense: 90 tablet; Refill: 1  -     glimepiride (AMARYL) 2 MG tablet; Take 1 tablet (2 mg total) by mouth daily with breakfast.  Dispense: 90 tablet; Refill: 1  -     metoprolol succinate (TOPROL-XL) 50 MG 24 hr tablet; Take 1 tablet (50 mg total) by mouth once daily.  Dispense: 90 tablet; Refill: 6  -     promethazine (PHENERGAN) 50 MG tablet; TAKE 1 TABLET BY MOUTH EVERY 4-6 HOURS AS NEEDED FOR NAUSEA *AVOID ALCOHOL *CAUSES DROWSINESS*  Dispense: 60 tablet; Refill: 1    3. Type 2 diabetes mellitus with other specified complication, without long-term current use of insulin  -     atorvastatin (LIPITOR) 80 MG tablet; Take 1 tablet (80 mg total) by mouth every evening.  Dispense: 90 tablet; Refill: 1  -     blood sugar diagnostic Strp; 1 strip by Misc.(Non-Drug; Combo Route) route 3 (three) times daily.  Dispense: 200 strip; Refill: 6  -     clopidogreL (PLAVIX) 75 mg tablet; Take 1 tablet (75 mg total) by mouth once daily.  Dispense: 90 tablet; Refill: 1  -     glimepiride (AMARYL) 2 MG tablet; Take 1 tablet (2 mg total) by mouth daily with breakfast.  Dispense: 90 tablet; Refill: 1  -     metoprolol succinate (TOPROL-XL) 50 MG 24 hr tablet; Take 1 tablet (50 mg total) by mouth once daily.  Dispense: 90 tablet; Refill: 6  -     promethazine (PHENERGAN) 50 MG tablet; TAKE 1 TABLET BY MOUTH EVERY 4-6 HOURS AS NEEDED FOR NAUSEA *AVOID ALCOHOL *CAUSES DROWSINESS*  Dispense: 60 tablet; Refill: 1  -     Hemoglobin A1C; Future; Expected date: 12/03/2024  -     Protein, Random Urine; Future; Expected date: 12/03/2024  -     Microalbumin/Creatinine Ratio, Urine; Future; Expected date: 12/03/2024  -     CBC Auto Differential; Future; Expected date:  12/03/2024    4. Gastroesophageal reflux disease, unspecified whether esophagitis present  -     dexlansoprazole (DEXILANT) 60 mg capsule; Take 1 capsule (60 mg total) by mouth once daily.  Dispense: 90 capsule; Refill: 1    5. Intractable chronic migraine without aura and without status migrainosus  -     ubrogepant (UBRELVY) 100 mg tablet; Take 1 tablet (100 mg total) by mouth as needed for Migraine. If symptoms persist or return, may repeat dose after 2 hours. Maximum: 200 mg per 24 hours  Dispense: 8 tablet; Refill: 5    Chronic hypertension   Chronic diabetes   Chronic allergic rhinitis   Chronic obesity   Otitis media to the right ear   Strep throat  -     fosinopriL (MONOPRIL) 20 MG tablet; Take 1 tablet (20 mg total) by mouth once daily.  Dispense: 90 tablet; Refill: 6  -     isosorbide mononitrate (IMDUR) 30 MG 24 hr tablet; Take 1 tablet (30 mg total) by mouth once daily.  Dispense: 90 tablet; Refill: 6  -     LANTUS SOLOSTAR U-100 INSULIN 100 unit/mL (3 mL) InPn pen; INJECT INJECT 35 UNITS UNDER THE SKIN EVERY NIGHT AT BEDTIME  Dispense: 12 mL; Refill: 4  -     loratadine (CLARITIN) 10 mg tablet; Take 1 tablet (10 mg total) by mouth once daily.  Dispense: 20 tablet; Refill: 3  -     neomycin-polymyxin-hydrocortisone (CORTISPORIN) 3.5-10,000-1 mg/mL-unit/mL-% otic suspension; Place 2 drops into the left ear 3 (three) times daily.  Dispense: 10 mL; Refill: 0  -     nitroGLYCERIN (NITROSTAT) 0.4 MG SL tablet; DISSOLVE 1 TABLET UNDER TONGUE AS NEEDED FOR CHEST PAIN EVERY 5 MINUTES ..NO MORE THAN 3 TABLETS AT A TIME SEEK MEDICAL ATTENTION IF NO RELIEF  Dispense: 25 tablet; Refill: 2  -     NOVOLOG MIX 70-30FLEXPEN U-100 100 unit/mL (70-30) InPn pen; Inject 20 Units into the skin 2 (two) times a day.  Dispense: 12 mL; Refill: 12  -     pioglitazone (ACTOS) 15 MG tablet; Take 1 tablet (15 mg total) by mouth once daily.  Dispense: 90 tablet; Refill: 1  -     amoxicillin (AMOXIL) 500 MG Tab; Take 1 tablet (500 mg  total) by mouth 3 (three) times daily.  Dispense: 21 tablet; Refill: 0            Munir Garcia MD

## 2024-12-10 DIAGNOSIS — G43.719 INTRACTABLE CHRONIC MIGRAINE WITHOUT AURA AND WITHOUT STATUS MIGRAINOSUS: ICD-10-CM

## 2024-12-10 RX ORDER — UBROGEPANT 100 MG/1
100 TABLET ORAL
Qty: 8 TABLET | Refills: 5 | Status: SHIPPED | OUTPATIENT
Start: 2024-12-10

## 2024-12-17 ENCOUNTER — OFFICE VISIT (OUTPATIENT)
Dept: FAMILY MEDICINE | Facility: CLINIC | Age: 59
End: 2024-12-17
Payer: MEDICARE

## 2024-12-17 VITALS
SYSTOLIC BLOOD PRESSURE: 125 MMHG | WEIGHT: 203 LBS | OXYGEN SATURATION: 97 % | TEMPERATURE: 98 F | HEART RATE: 82 BPM | DIASTOLIC BLOOD PRESSURE: 72 MMHG | BODY MASS INDEX: 31.86 KG/M2 | HEIGHT: 67 IN | RESPIRATION RATE: 18 BRPM

## 2024-12-17 DIAGNOSIS — J02.9 SORE THROAT: Primary | ICD-10-CM

## 2024-12-17 LAB
CTP QC/QA: YES
MOLECULAR STREP A: NEGATIVE

## 2024-12-17 PROCEDURE — 87651 STREP A DNA AMP PROBE: CPT | Mod: RHCUB | Performed by: INTERNAL MEDICINE

## 2024-12-17 PROCEDURE — 99213 OFFICE O/P EST LOW 20 MIN: CPT | Mod: ,,, | Performed by: INTERNAL MEDICINE

## 2024-12-17 NOTE — PROGRESS NOTES
Subjective:       Patient ID: Anuradha Godfrey is a 59 y.o. female.    Chief Complaint: Sore Throat (1 week fu )    History of Present Illness    CHIEF COMPLAINT:  Patient presents today for follow up of sore throat.    HISTORY OF PRESENT ILLNESS:  She has completed a course of antibiotics for strep throat and is managing residual throat irritation with cough drops.         Current Medications:    Current Outpatient Medications:     acetaminophen (TYLENOL) 500 MG tablet, Take 1,000 mg by mouth every 6 (six) hours as needed for Pain., Disp: , Rfl:     amitriptyline (ELAVIL) 25 MG tablet, Take 1 tablet (25 mg total) by mouth once daily., Disp: 180 tablet, Rfl: 1    amitriptyline (ELAVIL) 50 MG tablet, Take 1 tablet (50 mg total) by mouth every evening., Disp: 90 tablet, Rfl: 0    amoxicillin (AMOXIL) 500 MG Tab, Take 1 tablet (500 mg total) by mouth 3 (three) times daily., Disp: 21 tablet, Rfl: 0    atorvastatin (LIPITOR) 80 MG tablet, Take 1 tablet (80 mg total) by mouth every evening., Disp: 90 tablet, Rfl: 1    blood sugar diagnostic Strp, 1 strip by Misc.(Non-Drug; Combo Route) route 3 (three) times daily., Disp: 200 strip, Rfl: 6    clopidogreL (PLAVIX) 75 mg tablet, Take 1 tablet (75 mg total) by mouth once daily., Disp: 90 tablet, Rfl: 1    cycloSPORINE (RESTASIS) 0.05 % ophthalmic emulsion, PLACE 1 DROP INTO BOTH EYES TWICE DAILY, Disp: 60 each, Rfl: 3    dexlansoprazole (DEXILANT) 60 mg capsule, Take 1 capsule (60 mg total) by mouth once daily., Disp: 90 capsule, Rfl: 1    diclofenac sodium (VOLTAREN) 1 % Gel, Apply topically., Disp: , Rfl:     docusate sodium (COLACE) 50 MG capsule, Take 50 mg by mouth once daily., Disp: , Rfl:     eszopiclone (LUNESTA) 3 mg Tab, TAKE 1 TABLET BY MOUTH EVERY NIGHT AT BEDTIME AS NEEDED FOR REST ..NO ALCOHOL WHILE TAKING MEDICATION, Disp: 30 tablet, Rfl: 1    fosinopriL (MONOPRIL) 20 MG tablet, Take 1 tablet (20 mg total) by mouth once daily., Disp: 90 tablet, Rfl: 6     glimepiride (AMARYL) 2 MG tablet, Take 1 tablet (2 mg total) by mouth daily with breakfast., Disp: 90 tablet, Rfl: 1    HYDROcodone-acetaminophen (NORCO)  mg per tablet, TAKE 1 TABLET BY MOUTH 4 TIMES DAILY AS NEEDED ..MAY CAUSE DROWSINESS- AVOID ALCOHOL, Disp: , Rfl:     isosorbide mononitrate (IMDUR) 30 MG 24 hr tablet, Take 1 tablet (30 mg total) by mouth once daily., Disp: 90 tablet, Rfl: 6    LANTUS SOLOSTAR U-100 INSULIN 100 unit/mL (3 mL) InPn pen, INJECT INJECT 35 UNITS UNDER THE SKIN EVERY NIGHT AT BEDTIME, Disp: 12 mL, Rfl: 4    loratadine (CLARITIN) 10 mg tablet, Take 1 tablet (10 mg total) by mouth once daily., Disp: 20 tablet, Rfl: 3    methocarbamoL (ROBAXIN) 750 MG Tab, TAKE 1 TABLET BY MOUTH 3 TIMES DAILY WITH FOOD AS NEEDED ..MAY CAUSE DROWSINESS- AVOID ALCOHOL, Disp: , Rfl:     metoprolol succinate (TOPROL-XL) 50 MG 24 hr tablet, Take 1 tablet (50 mg total) by mouth once daily., Disp: 90 tablet, Rfl: 6    morphine (MS CONTIN) 15 MG 12 hr tablet, TAKE 1 TABLET BY MOUTH EACH NIGHT AT BEDTIME ..MAY CAUSE DROWSINESS- AVOID ALCOHOL, Disp: , Rfl:     neomycin-polymyxin-hydrocortisone (CORTISPORIN) 3.5-10,000-1 mg/mL-unit/mL-% otic suspension, Place 2 drops into the left ear 3 (three) times daily., Disp: 10 mL, Rfl: 0    nitroGLYCERIN (NITROSTAT) 0.4 MG SL tablet, DISSOLVE 1 TABLET UNDER TONGUE AS NEEDED FOR CHEST PAIN EVERY 5 MINUTES ..NO MORE THAN 3 TABLETS AT A TIME SEEK MEDICAL ATTENTION IF NO RELIEF, Disp: 25 tablet, Rfl: 2    NOVOLOG MIX 70-30FLEXPEN U-100 100 unit/mL (70-30) InPn pen, Inject 20 Units into the skin 2 (two) times a day., Disp: 12 mL, Rfl: 12    pioglitazone (ACTOS) 15 MG tablet, Take 1 tablet (15 mg total) by mouth once daily., Disp: 90 tablet, Rfl: 1    pregabalin (LYRICA) 200 MG Cap, TAKE 1 CAPSULE BY MOUTH 3 TIMES DAILY ..MAY CAUSE DROWSINESS- AVOID ALCOHOL, Disp: 90 capsule, Rfl: 0    promethazine (PHENERGAN) 50 MG tablet, TAKE 1 TABLET BY MOUTH EVERY 4-6 HOURS AS NEEDED  "FOR NAUSEA *AVOID ALCOHOL *CAUSES DROWSINESS*, Disp: 60 tablet, Rfl: 1    ubrogepant (UBRELVY) 100 mg tablet, Take 1 tablet (100 mg total) by mouth as needed for Migraine. If symptoms persist or return, may repeat dose after 2 hours. Maximum: 200 mg per 24 hours, Disp: 8 tablet, Rfl: 5           ROS  Twelve point system reviewed, unremarkable except for stated above in HPI.        Objective:         Vitals:    12/17/24 0907   BP: 125/72   BP Location: Right arm   Patient Position: Sitting   Pulse: 82   Resp: 18   Temp: 97.7 °F (36.5 °C)   TempSrc: Temporal   SpO2: 97%   Weight: 92.1 kg (203 lb)   Height: 5' 7" (1.702 m)        Physical Exam     Patient is awake alert oriented person place and  Lungs are clear to auscultation bilaterally no crackles or wheezes   Cardiovascular S1-S2 regular rate and rhythm no murmurs rubs or gallops   Abdomen is soft positive bowel sounds nontender, extremities no clubbing cyanosis edema  Neuro no focal neurological deficits  Skin warm and dry.   Throat not erythematous  Last Labs:     Office Visit on 12/17/2024   Component Date Value    Molecular Strep A, POC 12/17/2024 Negative      Acceptab* 12/17/2024 Yes    Office Visit on 12/03/2024   Component Date Value    Molecular Strep A, POC 12/03/2024 Positive (A)      Acceptab* 12/03/2024 Yes     POC Rapid COVID 12/03/2024 Negative      Acceptab* 12/03/2024 Yes     POC Molecular Influenza * 12/03/2024 Negative     POC Molecular Influenza * 12/03/2024 Negative      Acceptab* 12/03/2024 Yes     Hemoglobin A1C 12/03/2024 7.0     Estimated Average Glucose 12/03/2024 154     WBC 12/03/2024 10.36     RBC 12/03/2024 4.41     Hemoglobin 12/03/2024 13.1     Hematocrit 12/03/2024 42.6     MCV 12/03/2024 96.6 (H)     MCH 12/03/2024 29.7     MCHC 12/03/2024 30.8 (L)     RDW 12/03/2024 14.7 (H)     Platelet Count 12/03/2024 291     MPV 12/03/2024 11.3     Neutrophils % 12/03/2024 57.2     " Lymphocytes % 12/03/2024 32.3     Monocytes % 12/03/2024 7.5 (H)     Eosinophils % 12/03/2024 2.0     Basophils % 12/03/2024 0.6     Immature Granulocytes % 12/03/2024 0.4     nRBC, Auto 12/03/2024 0.0     Neutrophils, Abs 12/03/2024 5.92     Lymphocytes, Absolute 12/03/2024 3.35     Monocytes, Absolute 12/03/2024 0.78     Eosinophils, Absolute 12/03/2024 0.21     Basophils, Absolute 12/03/2024 0.06     Immature Granulocytes, A* 12/03/2024 0.04     nRBC, Absolute 12/03/2024 0.00     Diff Type 12/03/2024 Auto        Last Imaging:  US Breast Bilateral Complete  Narrative: Result:   US Breast Bilateral Complete     History:  Patient is 59 y.o. and is seen for breast pain, right.  Right breast pain     Films Compared:  Compared to: 05/01/2024 Mammo Digital Screening Bilat w/ [place], 11/29/2022   Mammo Digital Screening Bilat, and 11/18/2021 Mammo Digital Screening   Bilat     Findings:  The most recent screening mammograms dated 05/01/2024 were reviewed which   revealed scattered fibroglandular densities and no focal lesions.     The patient presents with a history of exquisite tenderness in  the 12:00   and 9 o'clock position of right breast.  This has been present   approximately 2 weeks.  She denies seeing any erythema.  A breast   examination was performed in conjunction with breast ultrasound.  All four   breast quadrants and the retroareolar regions were scanned.  The   background tissue is homogeneous - fibroglandular.     No discrete masses were palpable.  No erythema or edema is present.    Ultrasound of the right breast revealed normal fibroglandular tissues in   the 12:00, 10:00 and 9:00 positions.  No abnormalities are present in the   retroareolar region.  No abnormalities are present in the right axilla or   elsewhere.     Ultrasound of the left breast was also normal.     The patient is being treated for chronic pain and she states that she has   had cervical spine fusion.  The nature of her breast pain  could be related   to referred pain from her neck or right shoulder.  Impression: There are no clinical signs of infection and no sonographic lesions are   seen.      BI-RADS Category 1: Negative Finding     Recommendation:  Routine screening mammogram in 1 year is recommended.    Your estimated lifetime risk of breast cancer (to age 85) based on   Tyrer-Cuzick risk assessment model is Tyrer-Cuzick: 5.33%. According to   the American Cancer Society, patients with a lifetime breast cancer risk   of 20% or higher might benefit from supplemental screening tests.         **Labs and x-rays personally reviewed by me    ** reviewed           Assessment & Plan:   Assessment & Plan    IMPRESSION:  - No signs of active infection observed  - Lingering irritation post-antibiotic treatment for strep throat considered normal    STREP THROAT:  - Assessed the patient's condition following previous treatment for strep throat, noting lingering irritation after antibiotics.  - Explained that irritation may persist for 3-5 days after completing antibiotics for strep throat.  - Advised continuation of prescribed antibiotics for strep throat.  - Recommend OTC cough drops and chloraseptic spray for symptomatic relief as needed.    FOLLOW UP:  - Evaluated the patient's ears and throat, finding them clear with no signs of active infection.  - Instructed the patient to follow up if symptoms worsen or do not improve.           1. Sore throat/chronic  -     POCT Strep A, Molecular  -     Ambulatory referral/consult to ENT; Future; Expected date: 12/24/2024            Munir Garcia MD  This note was generated with the assistance of ambient listening technology. Verbal consent was obtained by the patient and accompanying visitor(s) for the recording of patient appointment to facilitate this note. I attest to having reviewed and edited the generated note for accuracy, though some syntax or spelling errors may persist. Please contact the  author of this note for any clarification.

## 2024-12-30 DIAGNOSIS — G47.00 INSOMNIA, UNSPECIFIED TYPE: ICD-10-CM

## 2024-12-30 RX ORDER — ESZOPICLONE 3 MG/1
TABLET, FILM COATED ORAL
Qty: 30 TABLET | Refills: 1 | Status: SHIPPED | OUTPATIENT
Start: 2024-12-30

## 2025-01-22 ENCOUNTER — TELEPHONE (OUTPATIENT)
Dept: SURGERY | Facility: CLINIC | Age: 60
End: 2025-01-22
Payer: MEDICARE

## 2025-01-22 NOTE — TELEPHONE ENCOUNTER
Message left on voicemail for patient to return call.Patient will need to schedule appointment to come in to see Dr Kern.   ----- Message from Alcides sent at 1/22/2025 11:00 AM CST -----  Regarding: question abt surgery  Who Called: Anuradha Godfrey    Pt called and said she is scheduled to have surgery with dr kern to [facility]. She wants to know if he can remove them all bc she has went through this before and she wants them all gone. However, I do not see a surgery scheduled for this pt     Preferred Method of Contact: Phone Call  Patient's Preferred Phone Number on File: [phone]   Best Call Back Number, if different:  Additional Information:

## 2025-02-13 DIAGNOSIS — R05.9 COUGH, UNSPECIFIED TYPE: ICD-10-CM

## 2025-02-13 DIAGNOSIS — E11.69 TYPE 2 DIABETES MELLITUS WITH OTHER SPECIFIED COMPLICATION, WITHOUT LONG-TERM CURRENT USE OF INSULIN: ICD-10-CM

## 2025-02-16 RX ORDER — CLOPIDOGREL BISULFATE 75 MG/1
75 TABLET ORAL
Qty: 90 TABLET | Refills: 1 | Status: SHIPPED | OUTPATIENT
Start: 2025-02-16

## 2025-02-25 LAB
LEFT EYE DM RETINOPATHY: POSITIVE
RIGHT EYE DM RETINOPATHY: POSITIVE

## 2025-03-09 DIAGNOSIS — G47.00 INSOMNIA, UNSPECIFIED TYPE: ICD-10-CM

## 2025-03-10 ENCOUNTER — PATIENT MESSAGE (OUTPATIENT)
Dept: NEUROLOGY | Facility: CLINIC | Age: 60
End: 2025-03-10
Payer: MEDICARE

## 2025-03-16 RX ORDER — ESZOPICLONE 3 MG/1
TABLET, FILM COATED ORAL
Qty: 30 TABLET | Refills: 1 | Status: SHIPPED | OUTPATIENT
Start: 2025-03-16

## 2025-04-02 ENCOUNTER — PATIENT OUTREACH (OUTPATIENT)
Facility: HOSPITAL | Age: 60
End: 2025-04-02
Payer: MEDICARE

## 2025-04-23 NOTE — PROGRESS NOTES
Annual Exam    Assessment/Plan:  59 y.o.  presenting for her annual exam:    Problem List Items Addressed This Visit    None        CC: No chief complaint on file.      HPI:  59 y.o.  presents for her gynecologic annual exam.  Dr. Angel pt; said to have checks every 2-3 yrs  Hx of endometriosis; refused surgery to remove scar tissue  Radial approach heart stents scheduled for    Supra cervical hysterectomy w/ BSO at 33 yo due to menopausal sx  Has hot flashes and night sweats since surg w/ several yr gap of relief of sx but sx have returned  Fhx of prolapse  Hx of gangrene w/ nursing home stay   Main concern is staying as healthy and safe as possible   AKA 2016        Patient seen and examined.  ***    Health Maintenance:    Birth control: Hysterectomy  Pregnancy plans: none   Safe relationship: ***  Healthy diet: ***   Exercise: ***  PCP: Munir Garcia MD     Screening:  Last pap smear: 2021- [facility]  History of abnormal pap smears: yes  STI screening: ***  Mammogram: 2024- negative; scheduled 2025  Colonoscopy or colon cancer screenin2024; repeat in 5 years    Review of Systems: The following ROS was otherwise negative, except as noted in the HPI:  constitutional, HEENT, respiratory, cardiovascular, gastrointestinal, genitourinary, skin, musculoskeletal, neurological, psych    Primary Care Physician: Munir Garcia MD    Obstetric History  OB History    Para Term  AB Living   1 1 1   1   SAB IAB Ectopic Multiple Live Births       1      # Outcome Date GA Lbr Kevin/2nd Weight Sex Type Anes PTL Lv   1 Term 1981 40w0d  3.997 kg (8 lb 13 oz) F Vag-Spont   JULIANA       Gynecologic History  Menstrual History:   LMP: Unknown    Age of Menarche: ***   Age of Menopause: ***      Sexual History:    Contraception: see above   Currently {is/is not:44037} sexually active   {Denies/Admits to:} history of STIs   {Denies/Admits to:} sexual problems    Pap  History:   History of abnormal pap smears: see above   Last pap: see above    Medical History:  Past Medical History:   Diagnosis Date    AAA (abdominal aortic aneurysm) 08/18/2014    Acute superficial gastritis without hemorrhage 01/04/2022    Anemia 05/16/2018    Anxiety state 07/21/2015    Celiac disease 02/29/2016    Chest pain 08/05/2014    Coronary arteriosclerosis 12/18/2018    Depressive disorder 08/18/2014    Diabetes mellitus     Diastolic dysfunction 08/14/2018    Embolism and thrombosis of arteries of extremities 10/15/2015    Gastroesophageal reflux disease without esophagitis 07/18/2017    History of myocardial infarction 07/13/2014    Hypercholesterolemia 01/18/2016    Hypertension 07/15/2020    Insufficiency, arterial, peripheral 08/01/2019    Multi-vessel coronary artery stenosis 08/01/2019    Myocardial infarct 2015    pt states she has had 5 total MI's    Occlusion and stenosis of unspecified carotid artery 07/26/2019    Peripheral arterial occlusive disease 12/15/2016    Peripheral vascular disease 11/12/2020    Venous insufficiency (chronic) (peripheral) 10/15/2015       Medications:  Medication List with Changes/Refills   Current Medications    ACETAMINOPHEN (TYLENOL) 500 MG TABLET    Take 1,000 mg by mouth every 6 (six) hours as needed for Pain.    AMITRIPTYLINE (ELAVIL) 25 MG TABLET    Take 1 tablet (25 mg total) by mouth once daily.    AMITRIPTYLINE (ELAVIL) 50 MG TABLET    Take 1 tablet (50 mg total) by mouth every evening.    AMOXICILLIN (AMOXIL) 500 MG TAB    Take 1 tablet (500 mg total) by mouth 3 (three) times daily.    ATORVASTATIN (LIPITOR) 80 MG TABLET    Take 1 tablet (80 mg total) by mouth every evening.    BLOOD SUGAR DIAGNOSTIC STRP    1 strip by Misc.(Non-Drug; Combo Route) route 3 (three) times daily.    CLOPIDOGREL (PLAVIX) 75 MG TABLET    TAKE 1 TABLET BY MOUTH ONCE DAILY    CYCLOSPORINE (RESTASIS) 0.05 % OPHTHALMIC EMULSION    PLACE 1 DROP INTO BOTH EYES TWICE DAILY     DEXLANSOPRAZOLE (DEXILANT) 60 MG CAPSULE    Take 1 capsule (60 mg total) by mouth once daily.    DICLOFENAC SODIUM (VOLTAREN) 1 % GEL    Apply topically.    DOCUSATE SODIUM (COLACE) 50 MG CAPSULE    Take 50 mg by mouth once daily.    ESZOPICLONE (LUNESTA) 3 MG TAB    TAKE 1 TABLET BY MOUTH EVERY NIGHT AT BEDTIME AS NEEDED FOR REST ..NO ALCOHOL WHILE TAKING MEDICATION    FOSINOPRIL (MONOPRIL) 20 MG TABLET    Take 1 tablet (20 mg total) by mouth once daily.    GLIMEPIRIDE (AMARYL) 2 MG TABLET    Take 1 tablet (2 mg total) by mouth daily with breakfast.    HYDROCODONE-ACETAMINOPHEN (NORCO)  MG PER TABLET    TAKE 1 TABLET BY MOUTH 4 TIMES DAILY AS NEEDED ..MAY CAUSE DROWSINESS- AVOID ALCOHOL    ISOSORBIDE MONONITRATE (IMDUR) 30 MG 24 HR TABLET    Take 1 tablet (30 mg total) by mouth once daily.    LANTUS SOLOSTAR U-100 INSULIN 100 UNIT/ML (3 ML) INPN PEN    INJECT INJECT 35 UNITS UNDER THE SKIN EVERY NIGHT AT BEDTIME    LORATADINE (CLARITIN) 10 MG TABLET    Take 1 tablet (10 mg total) by mouth once daily.    METHOCARBAMOL (ROBAXIN) 750 MG TAB    TAKE 1 TABLET BY MOUTH 3 TIMES DAILY WITH FOOD AS NEEDED ..MAY CAUSE DROWSINESS- AVOID ALCOHOL    METOPROLOL SUCCINATE (TOPROL-XL) 50 MG 24 HR TABLET    Take 1 tablet (50 mg total) by mouth once daily.    MORPHINE (MS CONTIN) 15 MG 12 HR TABLET    TAKE 1 TABLET BY MOUTH EACH NIGHT AT BEDTIME ..MAY CAUSE DROWSINESS- AVOID ALCOHOL    NEOMYCIN-POLYMYXIN-HYDROCORTISONE (CORTISPORIN) 3.5-10,000-1 MG/ML-UNIT/ML-% OTIC SUSPENSION    Place 2 drops into the left ear 3 (three) times daily.    NITROGLYCERIN (NITROSTAT) 0.4 MG SL TABLET    DISSOLVE 1 TABLET UNDER TONGUE AS NEEDED FOR CHEST PAIN EVERY 5 MINUTES ..NO MORE THAN 3 TABLETS AT A TIME SEEK MEDICAL ATTENTION IF NO RELIEF    NOVOLOG MIX 70-30FLEXPEN U-100 100 UNIT/ML (70-30) INPN PEN    Inject 20 Units into the skin 2 (two) times a day.    PIOGLITAZONE (ACTOS) 15 MG TABLET    Take 1 tablet (15 mg total) by mouth once daily.     PREGABALIN (LYRICA) 200 MG CAP    TAKE 1 CAPSULE BY MOUTH 3 TIMES DAILY ..MAY CAUSE DROWSINESS- AVOID ALCOHOL    PROMETHAZINE (PHENERGAN) 50 MG TABLET    TAKE 1 TABLET BY MOUTH EVERY 4-6 HOURS AS NEEDED FOR NAUSEA *AVOID ALCOHOL *CAUSES DROWSINESS*    UBROGEPANT (UBRELVY) 100 MG TABLET    Take 1 tablet (100 mg total) by mouth as needed for Migraine. If symptoms persist or return, may repeat dose after 2 hours. Maximum: 200 mg per 24 hours         Surgical History:  Past Surgical History:   Procedure Laterality Date    APPENDECTOMY Right     BREAST BIOPSY      CARDIAC CATHETERIZATION  02/04/2021    PCI to prox LAD; IVUS of LAD    CHOLECYSTECTOMY      OOPHORECTOMY      TOTAL ABDOMINAL HYSTERECTOMY         Allergies:  Review of patient's allergies indicates:   Allergen Reactions    Bee sting [allergen ext-venom-honey bee] Anaphylaxis    Nsaids (non-steroidal anti-inflammatory drug) Anaphylaxis    Grass pollen-alyssa, standard     Neurontin [gabapentin] Hallucinations    Topiramate      Other reaction(s): Unknown       Family History:  Family History   Problem Relation Name Age of Onset    Heart disease Mother      Stroke Mother      Heart disease Brother         Social History:  Social History     Socioeconomic History    Marital status: Single   Tobacco Use    Smoking status: Never    Smokeless tobacco: Never   Substance and Sexual Activity    Alcohol use: Not Currently    Drug use: Never    Sexual activity: Not Currently     Social Drivers of Health     Financial Resource Strain: Low Risk  (3/21/2024)    Overall Financial Resource Strain (CARDIA)     Difficulty of Paying Living Expenses: Not hard at all   Food Insecurity: No Food Insecurity (3/21/2024)    Hunger Vital Sign     Worried About Running Out of Food in the Last Year: Never true     Ran Out of Food in the Last Year: Never true   Transportation Needs: No Transportation Needs (3/21/2024)    PRAPARE - Transportation     Lack of Transportation (Medical): No      Lack of Transportation (Non-Medical): No   Physical Activity: Inactive (3/21/2024)    Exercise Vital Sign     Days of Exercise per Week: 0 days     Minutes of Exercise per Session: 0 min   Stress: No Stress Concern Present (3/21/2024)    [facility] - Occupational Stress Questionnaire     Feeling of Stress : Not at all   Housing Stability: Low Risk  (3/21/2024)    Housing Stability Vital Sign     Unable to Pay for Housing in the Last Year: No     Number of Places Lived in the Last Year: 1     Unstable Housing in the Last Year: No       Objective:  There is no height or weight on file to calculate BMI.    There were no vitals taken for this visit.    General: Alert, well appearing, no acute distress  Head: Normocephalic, atraumatic  Breasts: Symmetric, non-tender to palpation, no skin changes, palpable axillary lymph nodes or masses noted  Lungs: Unlabored respirations. Clear to auscultation bilaterally.  Cardiovascular: Regular rate and rhythm.   Abdomen: Soft, nontender, nondistended   Pelvic: Exam chaperoned by female assistant.   External: normal female genitalia, no masses or lesions   Vagina: pale, pink mucosa with decreased rugae, minimal clear discharge. Vaginal cuff intact without lesions.   Cervix: surgically absent.  Uterus: surgically absent.  Adnexa: no masses or fullness, nontender  Rectovaginal: deferred  Extremities: No redness or tenderness  Skin: Well perfused, normal coloration and turgor, no lesions or rashes visualized  Neuro: Alert, oriented, normal speech, no focal deficits, moves extremities appropriately  Psych: Normal mood and behavior.     Thien Cheema MD         CHOLECYSTECTOMY      OOPHORECTOMY      TOTAL ABDOMINAL HYSTERECTOMY         Allergies:  Review of patient's allergies indicates:   Allergen Reactions    Bee sting [allergen ext-venom-honey bee] Anaphylaxis    Nsaids (non-steroidal anti-inflammatory drug) Anaphylaxis    Grass pollen-alyssa, standard     Neurontin [gabapentin] Hallucinations    Topiramate      Other reaction(s): Unknown       Family History:  Family History   Problem Relation Name Age of Onset    Heart disease Mother      Stroke Mother      Heart disease Brother         Social History:  Social History     Socioeconomic History    Marital status: Single   Tobacco Use    Smoking status: Never    Smokeless tobacco: Never   Substance and Sexual Activity    Alcohol use: Not Currently    Drug use: Never    Sexual activity: Not Currently     Social Drivers of Health     Financial Resource Strain: Low Risk  (3/21/2024)    Overall Financial Resource Strain (CARDIA)     Difficulty of Paying Living Expenses: Not hard at all   Food Insecurity: No Food Insecurity (3/21/2024)    Hunger Vital Sign     Worried About Running Out of Food in the Last Year: Never true     Ran Out of Food in the Last Year: Never true   Transportation Needs: No Transportation Needs (3/21/2024)    PRAPARE - Transportation     Lack of Transportation (Medical): No     Lack of Transportation (Non-Medical): No   Physical Activity: Inactive (3/21/2024)    Exercise Vital Sign     Days of Exercise per Week: 0 days     Minutes of Exercise per Session: 0 min   Stress: No Stress Concern Present (3/21/2024)    [employer] - Occupational Stress Questionnaire     Feeling of Stress : Not at all   Housing Stability: Low Risk  (3/21/2024)    Housing Stability Vital Sign     Unable to Pay for Housing in the Last Year: No     Number of Places Lived in the Last Year: 1     Unstable Housing in the Last Year: No       Objective:  Body mass index is 34.93 kg/m².    BP (!) 166/74  "(BP Location: Right arm)   Pulse 89   Ht 5' 7" (1.702 m)   Wt 101.2 kg (223 lb)   BMI 34.93 kg/m²     General: Alert, well appearing, no acute distress. Obese.  Head: Normocephalic, atraumatic  Breasts: Symmetric, non-tender to palpation, no skin changes, palpable axillary lymph nodes or masses noted  Lungs: Unlabored respirations. Clear to auscultation bilaterally.  Cardiovascular: Regular rate and rhythm.   Abdomen: Soft, nontender, nondistended   Pelvic: Exam chaperoned by female assistant. Limited secondary to body habitus.  External: normal female genitalia, no masses or lesions   Vagina: pale, pink mucosa with decreased rugae, minimal clear discharge. Vaginal cuff intact without lesions.   Cervix: No lesions. PAP obtained.  Uterus: surgically absent.  Adnexa: no masses or fullness, nontender  Rectovaginal: deferred  Extremities: No redness or tenderness. AKA.  Skin: Well perfused, normal coloration and turgor, no lesions or rashes visualized  Neuro: Alert, oriented, normal speech, no focal deficits, moves extremities appropriately  Psych: Normal mood and behavior.     Thien Cheema MD     "

## 2025-04-24 ENCOUNTER — OFFICE VISIT (OUTPATIENT)
Dept: OBSTETRICS AND GYNECOLOGY | Facility: CLINIC | Age: 60
End: 2025-04-24
Payer: MEDICARE

## 2025-04-24 VITALS
DIASTOLIC BLOOD PRESSURE: 74 MMHG | HEIGHT: 67 IN | SYSTOLIC BLOOD PRESSURE: 166 MMHG | WEIGHT: 223 LBS | BODY MASS INDEX: 35 KG/M2 | HEART RATE: 89 BPM

## 2025-04-24 DIAGNOSIS — Z90.711 HISTORY OF ABDOMINAL SUPRACERVICAL SUBTOTAL HYSTERECTOMY: ICD-10-CM

## 2025-04-24 DIAGNOSIS — Z12.4 SCREENING FOR MALIGNANT NEOPLASM OF THE CERVIX: ICD-10-CM

## 2025-04-24 DIAGNOSIS — Z12.4 ENCOUNTER FOR PAPANICOLAOU SMEAR FOR CERVICAL CANCER SCREENING: Primary | ICD-10-CM

## 2025-04-24 DIAGNOSIS — Z01.411 ENCOUNTER FOR GYNECOLOGICAL EXAMINATION (GENERAL) (ROUTINE) WITH ABNORMAL FINDINGS: ICD-10-CM

## 2025-04-24 DIAGNOSIS — N95.1 VASOMOTOR SYMPTOMS DUE TO MENOPAUSE: ICD-10-CM

## 2025-04-24 PROCEDURE — G0123 SCREEN CERV/VAG THIN LAYER: HCPCS | Mod: TC,GCY | Performed by: OBSTETRICS & GYNECOLOGY

## 2025-04-24 PROCEDURE — 99213 OFFICE O/P EST LOW 20 MIN: CPT | Mod: PBBFAC | Performed by: OBSTETRICS & GYNECOLOGY

## 2025-04-24 PROCEDURE — 99999 PR PBB SHADOW E&M-EST. PATIENT-LVL III: CPT | Mod: PBBFAC,,, | Performed by: OBSTETRICS & GYNECOLOGY

## 2025-04-24 PROCEDURE — 87626 HPV SEP HI-RSK TYP&POOL RSLT: CPT | Mod: ,,, | Performed by: CLINICAL MEDICAL LABORATORY

## 2025-04-24 PROCEDURE — G0101 CA SCREEN;PELVIC/BREAST EXAM: HCPCS | Mod: S$PBB,,, | Performed by: OBSTETRICS & GYNECOLOGY

## 2025-04-25 ENCOUNTER — TELEPHONE (OUTPATIENT)
Dept: VASCULAR SURGERY | Facility: CLINIC | Age: 60
End: 2025-04-25
Payer: MEDICARE

## 2025-04-28 LAB
GH SERPL-MCNC: NORMAL NG/ML
INSULIN SERPL-ACNC: NORMAL U[IU]/ML
LAB AP CLINICAL INFORMATION: NORMAL
LAB AP GYN INTERPRETATION: NEGATIVE
LAB AP PAP DISCLAIMER COMMENTS: NORMAL
RENIN PLAS-CCNC: NORMAL NG/ML/H

## 2025-04-29 LAB
LEFT EYE DM RETINOPATHY: POSITIVE
RIGHT EYE DM RETINOPATHY: POSITIVE

## 2025-04-30 ENCOUNTER — RESULTS FOLLOW-UP (OUTPATIENT)
Dept: OBSTETRICS AND GYNECOLOGY | Facility: CLINIC | Age: 60
End: 2025-04-30

## 2025-04-30 LAB
HPV 16: NEGATIVE
HPV 18: NEGATIVE
HPV OTHER: NEGATIVE

## 2025-05-01 ENCOUNTER — OFFICE VISIT (OUTPATIENT)
Dept: FAMILY MEDICINE | Facility: CLINIC | Age: 60
End: 2025-05-01
Payer: MEDICARE

## 2025-05-01 VITALS
BODY MASS INDEX: 34.37 KG/M2 | HEIGHT: 67 IN | HEART RATE: 80 BPM | SYSTOLIC BLOOD PRESSURE: 121 MMHG | RESPIRATION RATE: 17 BRPM | WEIGHT: 219 LBS | DIASTOLIC BLOOD PRESSURE: 74 MMHG | OXYGEN SATURATION: 96 % | TEMPERATURE: 98 F

## 2025-05-01 DIAGNOSIS — J30.1 NON-SEASONAL ALLERGIC RHINITIS DUE TO POLLEN: ICD-10-CM

## 2025-05-01 DIAGNOSIS — E11.69 TYPE 2 DIABETES MELLITUS WITH OTHER SPECIFIED COMPLICATION, WITHOUT LONG-TERM CURRENT USE OF INSULIN: ICD-10-CM

## 2025-05-01 DIAGNOSIS — G47.00 INSOMNIA, UNSPECIFIED TYPE: ICD-10-CM

## 2025-05-01 DIAGNOSIS — H04.129 EYE DRYNESS: ICD-10-CM

## 2025-05-01 DIAGNOSIS — I25.10 ATHEROSCLEROSIS OF NATIVE CORONARY ARTERY OF NATIVE HEART WITHOUT ANGINA PECTORIS: ICD-10-CM

## 2025-05-01 DIAGNOSIS — I10 ESSENTIAL (PRIMARY) HYPERTENSION: Primary | ICD-10-CM

## 2025-05-01 DIAGNOSIS — E78.2 MIXED HYPERLIPIDEMIA: ICD-10-CM

## 2025-05-01 DIAGNOSIS — R05.9 COUGH, UNSPECIFIED TYPE: ICD-10-CM

## 2025-05-01 DIAGNOSIS — G43.719 INTRACTABLE CHRONIC MIGRAINE WITHOUT AURA AND WITHOUT STATUS MIGRAINOSUS: ICD-10-CM

## 2025-05-01 DIAGNOSIS — E11.42 DIABETIC PERIPHERAL NEUROPATHY: ICD-10-CM

## 2025-05-01 PROCEDURE — 99499 UNLISTED E&M SERVICE: CPT | Mod: ,,, | Performed by: INTERNAL MEDICINE

## 2025-05-01 RX ORDER — INSULIN ASPART 100 [IU]/ML
20 INJECTION, SUSPENSION SUBCUTANEOUS 2 TIMES DAILY
Qty: 12 ML | Refills: 12 | Status: SHIPPED | OUTPATIENT
Start: 2025-05-01

## 2025-05-01 RX ORDER — INSULIN GLARGINE 100 [IU]/ML
INJECTION, SOLUTION SUBCUTANEOUS
Qty: 12 ML | Refills: 4 | Status: SHIPPED | OUTPATIENT
Start: 2025-05-01

## 2025-05-01 RX ORDER — UBROGEPANT 100 MG/1
100 TABLET ORAL
Qty: 8 TABLET | Refills: 5 | Status: SHIPPED | OUTPATIENT
Start: 2025-05-01

## 2025-05-01 RX ORDER — CLOPIDOGREL BISULFATE 75 MG/1
75 TABLET ORAL DAILY
Qty: 90 TABLET | Refills: 1 | Status: SHIPPED | OUTPATIENT
Start: 2025-05-01

## 2025-05-01 RX ORDER — FOSINOPRIL SODIUM 20 MG/1
20 TABLET ORAL DAILY
Qty: 90 TABLET | Refills: 6 | Status: SHIPPED | OUTPATIENT
Start: 2025-05-01

## 2025-05-01 RX ORDER — LORATADINE 10 MG/1
10 TABLET ORAL DAILY
Qty: 20 TABLET | Refills: 3 | Status: SHIPPED | OUTPATIENT
Start: 2025-05-01

## 2025-05-01 RX ORDER — METOPROLOL SUCCINATE 50 MG/1
50 TABLET, EXTENDED RELEASE ORAL DAILY
Qty: 90 TABLET | Refills: 6 | Status: SHIPPED | OUTPATIENT
Start: 2025-05-01

## 2025-05-01 RX ORDER — ISOSORBIDE MONONITRATE 30 MG/1
30 TABLET, EXTENDED RELEASE ORAL DAILY
Qty: 90 TABLET | Refills: 6 | Status: SHIPPED | OUTPATIENT
Start: 2025-05-01

## 2025-05-01 RX ORDER — ATORVASTATIN CALCIUM 80 MG/1
80 TABLET, FILM COATED ORAL NIGHTLY
Qty: 90 TABLET | Refills: 1 | Status: SHIPPED | OUTPATIENT
Start: 2025-05-01

## 2025-05-01 RX ORDER — CYCLOSPORINE 0.5 MG/ML
EMULSION OPHTHALMIC
Qty: 60 EACH | Refills: 3 | Status: SHIPPED | OUTPATIENT
Start: 2025-05-01

## 2025-05-01 RX ORDER — GLIMEPIRIDE 2 MG/1
2 TABLET ORAL
Qty: 90 TABLET | Refills: 1 | Status: SHIPPED | OUTPATIENT
Start: 2025-05-01

## 2025-05-01 RX ORDER — ESZOPICLONE 3 MG/1
TABLET, FILM COATED ORAL
Qty: 30 TABLET | Refills: 1 | Status: SHIPPED | OUTPATIENT
Start: 2025-05-01

## 2025-05-01 RX ORDER — PREGABALIN 200 MG/1
CAPSULE ORAL
Qty: 90 CAPSULE | Refills: 0 | Status: SHIPPED | OUTPATIENT
Start: 2025-05-01

## 2025-05-01 RX ORDER — PIOGLITAZONE 15 MG/1
15 TABLET ORAL DAILY
Qty: 90 TABLET | Refills: 1 | Status: SHIPPED | OUTPATIENT
Start: 2025-05-01

## 2025-05-01 NOTE — PROGRESS NOTES
"Subjective:       Patient ID: Anuradha Godfrey is a 59 y.o. female.    Chief Complaint:   History of Present Illness    CHIEF COMPLAINT:  Patient presents today for follow up         Current Medications:  Current Medications[1]           ROS  Twelve point system reviewed, unremarkable except for stated above in HPI.        Objective:         Vitals:    05/01/25 0820   BP: 121/74   BP Location: Left arm   Patient Position: Sitting   Pulse: 80   Resp: 17   Temp: 98.2 °F (36.8 °C)   TempSrc: Temporal   SpO2: 96%   Weight: 99.3 kg (219 lb)   Height: 5' 7" (1.702 m)             Last Labs:     Office Visit on 04/24/2025   Component Date Value    Case Report 04/24/2025                      Value:Pap Cytology                                      Case: [id number]                                   Authorizing Provider:  Thien Cheema MD         Collected:           04/24/2025 11:43 AM          Ordering Location:     [facility] Received:            04/25/2025 08:40 AM                                 - Obstetrics And                                                                                    Gynecology                                                                   First Screen:          Naomy Henson CT(ASCP)                                                   Specimen:    Liquid-Based Pap Test, Screening, Cervix                                                   Interpretation 04/24/2025 Negative     General Categorization 04/24/2025 Negative for intraepithelial lesion or malignancy     Specimen Adequacy 04/24/2025 Satisfactory for evaluation     Clinical Information 04/24/2025                      Value:cancer screening    Disclaimer 04/24/2025                      Value:Notice: An irreducible false negative rate exists with pap smears, therefore a negative result does not absolutely exclude malignancy.      HPV 16 04/24/2025 Negative     HPV 18 04/24/2025 Negative     HPV Other 04/24/2025 Negative  "   Patient Outreach on 04/02/2025   Component Date Value    Left Eye DM Retinopathy 02/25/2025 Positive     Right Eye DM Retinopathy 02/25/2025 Positive        Last Imaging:         **Labs and x-rays personally reviewed by me    ** reviewed           Assessment & Plan:   Assessment & Plan               1. Essential (primary) hypertension  -     fosinopriL (MONOPRIL) 20 MG tablet; Take 1 tablet (20 mg total) by mouth once daily.  Dispense: 90 tablet; Refill: 6  -     metoprolol succinate (TOPROL-XL) 50 MG 24 hr tablet; Take 1 tablet (50 mg total) by mouth once daily.  Dispense: 90 tablet; Refill: 6    2. Cough, unspecified type    3. Type 2 diabetes mellitus with other specified complication, without long-term current use of insulin  -     glimepiride (AMARYL) 2 MG tablet; Take 1 tablet (2 mg total) by mouth daily with breakfast.  Dispense: 90 tablet; Refill: 1  -     LANTUS SOLOSTAR U-100 INSULIN 100 unit/mL (3 mL) InPn pen; INJECT INJECT 35 UNITS UNDER THE SKIN EVERY NIGHT AT BEDTIME  Dispense: 12 mL; Refill: 4  -     NOVOLOG MIX 70-30FLEXPEN U-100 100 unit/mL (70-30) InPn pen; Inject 20 Units into the skin 2 (two) times a day.  Dispense: 12 mL; Refill: 12  -     pioglitazone (ACTOS) 15 MG tablet; Take 1 tablet (15 mg total) by mouth once daily.  Dispense: 90 tablet; Refill: 1  -     blood sugar diagnostic Strp; 1 strip by Misc.(Non-Drug; Combo Route) route 3 (three) times daily.  Dispense: 200 strip; Refill: 6    4. Insomnia, unspecified type  -     eszopiclone (LUNESTA) 3 mg Tab; TAKE 1 TABLET BY MOUTH EVERY NIGHT AT BEDTIME AS NEEDED FOR REST ..NO ALCOHOL WHILE TAKING MEDICATION  Dispense: 30 tablet; Refill: 1    5. Intractable chronic migraine without aura and without status migrainosus  -     ubrogepant (UBRELVY) 100 mg tablet; Take 1 tablet (100 mg total) by mouth as needed for Migraine. If symptoms persist or return, may repeat dose after 2 hours. Maximum: 200 mg per 24 hours  Dispense: 8 tablet; Refill:  5    6. Non-seasonal allergic rhinitis due to pollen  -     loratadine (CLARITIN) 10 mg tablet; Take 1 tablet (10 mg total) by mouth once daily.  Dispense: 20 tablet; Refill: 3    7. Mixed hyperlipidemia  -     atorvastatin (LIPITOR) 80 MG tablet; Take 1 tablet (80 mg total) by mouth every evening.  Dispense: 90 tablet; Refill: 1    8. Atherosclerosis of native coronary artery of native heart without angina pectoris  -     clopidogreL (PLAVIX) 75 mg tablet; Take 1 tablet (75 mg total) by mouth once daily.  Dispense: 90 tablet; Refill: 1  -     isosorbide mononitrate (IMDUR) 30 MG 24 hr tablet; Take 1 tablet (30 mg total) by mouth once daily.  Dispense: 90 tablet; Refill: 6    9. Diabetic peripheral neuropathy  -     pregabalin (LYRICA) 200 MG Cap; TAKE 1 CAPSULE BY MOUTH 3 TIMES DAILY ..MAY CAUSE DROWSINESS- AVOID ALCOHOL  Dispense: 90 capsule; Refill: 0    10. Eye dryness  -     cycloSPORINE (RESTASIS) 0.05 % ophthalmic emulsion; PLACE 1 DROP INTO BOTH EYES TWICE DAILY  Dispense: 60 each; Refill: 3            Munir Garcia MD  This note was generated with the assistance of ambient listening technology. Verbal consent was obtained by the patient and accompanying visitor(s) for the recording of patient appointment to facilitate this note. I attest to having reviewed and edited the generated note for accuracy, though some syntax or spelling errors may persist. Please contact the author of this note for any clarification.            [1]   Current Outpatient Medications:     acetaminophen (TYLENOL) 500 MG tablet, Take 1,000 mg by mouth every 6 (six) hours as needed for Pain., Disp: , Rfl:     amitriptyline (ELAVIL) 25 MG tablet, Take 1 tablet (25 mg total) by mouth once daily., Disp: 180 tablet, Rfl: 1    amitriptyline (ELAVIL) 50 MG tablet, Take 1 tablet (50 mg total) by mouth every evening., Disp: 90 tablet, Rfl: 0    amoxicillin (AMOXIL) 500 MG Tab, Take 1 tablet (500 mg total) by mouth 3 (three) times daily.,  Disp: 21 tablet, Rfl: 0    dexlansoprazole (DEXILANT) 60 mg capsule, Take 1 capsule (60 mg total) by mouth once daily., Disp: 90 capsule, Rfl: 1    diclofenac sodium (VOLTAREN) 1 % Gel, Apply topically., Disp: , Rfl:     docusate sodium (COLACE) 50 MG capsule, Take 50 mg by mouth once daily., Disp: , Rfl:     HYDROcodone-acetaminophen (NORCO)  mg per tablet, TAKE 1 TABLET BY MOUTH 4 TIMES DAILY AS NEEDED ..MAY CAUSE DROWSINESS- AVOID ALCOHOL, Disp: , Rfl:     methocarbamoL (ROBAXIN) 750 MG Tab, TAKE 1 TABLET BY MOUTH 3 TIMES DAILY WITH FOOD AS NEEDED ..MAY CAUSE DROWSINESS- AVOID ALCOHOL, Disp: , Rfl:     morphine (MS CONTIN) 15 MG 12 hr tablet, TAKE 1 TABLET BY MOUTH EACH NIGHT AT BEDTIME ..MAY CAUSE DROWSINESS- AVOID ALCOHOL, Disp: , Rfl:     neomycin-polymyxin-hydrocortisone (CORTISPORIN) 3.5-10,000-1 mg/mL-unit/mL-% otic suspension, Place 2 drops into the left ear 3 (three) times daily., Disp: 10 mL, Rfl: 0    nitroGLYCERIN (NITROSTAT) 0.4 MG SL tablet, DISSOLVE 1 TABLET UNDER TONGUE AS NEEDED FOR CHEST PAIN EVERY 5 MINUTES ..NO MORE THAN 3 TABLETS AT A TIME SEEK MEDICAL ATTENTION IF NO RELIEF, Disp: 25 tablet, Rfl: 2    promethazine (PHENERGAN) 50 MG tablet, TAKE 1 TABLET BY MOUTH EVERY 4-6 HOURS AS NEEDED FOR NAUSEA *AVOID ALCOHOL *CAUSES DROWSINESS*, Disp: 60 tablet, Rfl: 1    atorvastatin (LIPITOR) 80 MG tablet, Take 1 tablet (80 mg total) by mouth every evening., Disp: 90 tablet, Rfl: 1    blood sugar diagnostic Strp, 1 strip by Misc.(Non-Drug; Combo Route) route 3 (three) times daily., Disp: 200 strip, Rfl: 6    clopidogreL (PLAVIX) 75 mg tablet, Take 1 tablet (75 mg total) by mouth once daily., Disp: 90 tablet, Rfl: 1    cycloSPORINE (RESTASIS) 0.05 % ophthalmic emulsion, PLACE 1 DROP INTO BOTH EYES TWICE DAILY, Disp: 60 each, Rfl: 3    eszopiclone (LUNESTA) 3 mg Tab, TAKE 1 TABLET BY MOUTH EVERY NIGHT AT BEDTIME AS NEEDED FOR REST ..NO ALCOHOL WHILE TAKING MEDICATION, Disp: 30 tablet, Rfl: 1     fosinopriL (MONOPRIL) 20 MG tablet, Take 1 tablet (20 mg total) by mouth once daily., Disp: 90 tablet, Rfl: 6    glimepiride (AMARYL) 2 MG tablet, Take 1 tablet (2 mg total) by mouth daily with breakfast., Disp: 90 tablet, Rfl: 1    isosorbide mononitrate (IMDUR) 30 MG 24 hr tablet, Take 1 tablet (30 mg total) by mouth once daily., Disp: 90 tablet, Rfl: 6    LANTUS SOLOSTAR U-100 INSULIN 100 unit/mL (3 mL) InPn pen, INJECT INJECT 35 UNITS UNDER THE SKIN EVERY NIGHT AT BEDTIME, Disp: 12 mL, Rfl: 4    loratadine (CLARITIN) 10 mg tablet, Take 1 tablet (10 mg total) by mouth once daily., Disp: 20 tablet, Rfl: 3    metoprolol succinate (TOPROL-XL) 50 MG 24 hr tablet, Take 1 tablet (50 mg total) by mouth once daily., Disp: 90 tablet, Rfl: 6    NOVOLOG MIX 70-30FLEXPEN U-100 100 unit/mL (70-30) InPn pen, Inject 20 Units into the skin 2 (two) times a day., Disp: 12 mL, Rfl: 12    pioglitazone (ACTOS) 15 MG tablet, Take 1 tablet (15 mg total) by mouth once daily., Disp: 90 tablet, Rfl: 1    pregabalin (LYRICA) 200 MG Cap, TAKE 1 CAPSULE BY MOUTH 3 TIMES DAILY ..MAY CAUSE DROWSINESS- AVOID ALCOHOL, Disp: 90 capsule, Rfl: 0    ubrogepant (UBRELVY) 100 mg tablet, Take 1 tablet (100 mg total) by mouth as needed for Migraine. If symptoms persist or return, may repeat dose after 2 hours. Maximum: 200 mg per 24 hours, Disp: 8 tablet, Rfl: 5

## 2025-05-05 ENCOUNTER — PATIENT OUTREACH (OUTPATIENT)
Facility: HOSPITAL | Age: 60
End: 2025-05-05
Payer: MEDICARE

## 2025-05-05 ENCOUNTER — RESULTS FOLLOW-UP (OUTPATIENT)
Dept: FAMILY MEDICINE | Facility: CLINIC | Age: 60
End: 2025-05-05
Payer: MEDICARE

## 2025-05-07 ENCOUNTER — HOSPITAL ENCOUNTER (OUTPATIENT)
Dept: RADIOLOGY | Facility: HOSPITAL | Age: 60
Discharge: HOME | End: 2025-05-07
Attending: INTERNAL MEDICINE
Payer: MEDICARE

## 2025-05-07 DIAGNOSIS — Z12.31 OTHER SCREENING MAMMOGRAM: ICD-10-CM

## 2025-05-07 PROCEDURE — 77063 BREAST TOMOSYNTHESIS BI: CPT | Mod: TC

## 2025-05-12 ENCOUNTER — RESULTS FOLLOW-UP (OUTPATIENT)
Dept: FAMILY MEDICINE | Facility: CLINIC | Age: 60
End: 2025-05-12
Payer: MEDICARE

## 2025-05-15 ENCOUNTER — TELEPHONE (OUTPATIENT)
Dept: GASTROENTEROLOGY | Facility: CLINIC | Age: 60
End: 2025-05-15
Payer: MEDICARE

## 2025-05-15 ENCOUNTER — PATIENT MESSAGE (OUTPATIENT)
Dept: NEUROLOGY | Facility: CLINIC | Age: 60
End: 2025-05-15
Payer: MEDICARE

## 2025-05-16 ENCOUNTER — OFFICE VISIT (OUTPATIENT)
Dept: GASTROENTEROLOGY | Facility: CLINIC | Age: 60
End: 2025-05-16
Payer: MEDICARE

## 2025-05-16 ENCOUNTER — OFFICE VISIT (OUTPATIENT)
Dept: NEUROLOGY | Facility: CLINIC | Age: 60
End: 2025-05-16
Payer: MEDICARE

## 2025-05-16 VITALS
DIASTOLIC BLOOD PRESSURE: 65 MMHG | SYSTOLIC BLOOD PRESSURE: 138 MMHG | BODY MASS INDEX: 34.3 KG/M2 | HEART RATE: 85 BPM | OXYGEN SATURATION: 96 % | HEIGHT: 67 IN

## 2025-05-16 VITALS
DIASTOLIC BLOOD PRESSURE: 67 MMHG | OXYGEN SATURATION: 96 % | BODY MASS INDEX: 33.74 KG/M2 | SYSTOLIC BLOOD PRESSURE: 136 MMHG | HEART RATE: 86 BPM | WEIGHT: 215 LBS | HEIGHT: 67 IN

## 2025-05-16 DIAGNOSIS — R11.0 NAUSEA: ICD-10-CM

## 2025-05-16 DIAGNOSIS — G43.719 INTRACTABLE CHRONIC MIGRAINE WITHOUT AURA AND WITHOUT STATUS MIGRAINOSUS: Primary | ICD-10-CM

## 2025-05-16 DIAGNOSIS — K59.00 CONSTIPATION, UNSPECIFIED CONSTIPATION TYPE: ICD-10-CM

## 2025-05-16 DIAGNOSIS — E11.69 TYPE 2 DIABETES MELLITUS WITH OTHER SPECIFIED COMPLICATION, WITHOUT LONG-TERM CURRENT USE OF INSULIN: ICD-10-CM

## 2025-05-16 DIAGNOSIS — R05.9 COUGH, UNSPECIFIED TYPE: ICD-10-CM

## 2025-05-16 DIAGNOSIS — K90.0 CELIAC DISEASE: ICD-10-CM

## 2025-05-16 DIAGNOSIS — K21.9 GASTROESOPHAGEAL REFLUX DISEASE, UNSPECIFIED WHETHER ESOPHAGITIS PRESENT: Primary | ICD-10-CM

## 2025-05-16 PROCEDURE — 99999 PR PBB SHADOW E&M-EST. PATIENT-LVL V: CPT | Mod: PBBFAC,,, | Performed by: NURSE PRACTITIONER

## 2025-05-16 PROCEDURE — 99213 OFFICE O/P EST LOW 20 MIN: CPT | Mod: S$PBB,,, | Performed by: NURSE PRACTITIONER

## 2025-05-16 PROCEDURE — 99215 OFFICE O/P EST HI 40 MIN: CPT | Mod: PBBFAC | Performed by: NURSE PRACTITIONER

## 2025-05-16 RX ORDER — AMITRIPTYLINE HYDROCHLORIDE 50 MG/1
50 TABLET, FILM COATED ORAL NIGHTLY
Qty: 90 TABLET | Refills: 0 | Status: SHIPPED | OUTPATIENT
Start: 2025-05-16

## 2025-05-16 RX ORDER — UBROGEPANT 100 MG/1
100 TABLET ORAL
Qty: 8 TABLET | Refills: 5 | Status: SHIPPED | OUTPATIENT
Start: 2025-05-16

## 2025-05-16 RX ORDER — UBROGEPANT 100 MG/1
100 TABLET ORAL
Qty: 8 TABLET | Refills: 5 | Status: SHIPPED | OUTPATIENT
Start: 2025-05-16 | End: 2025-05-16

## 2025-05-16 RX ORDER — AMITRIPTYLINE HYDROCHLORIDE 25 MG/1
25 TABLET, FILM COATED ORAL DAILY
Qty: 180 TABLET | Refills: 1 | Status: SHIPPED | OUTPATIENT
Start: 2025-05-16

## 2025-05-16 NOTE — PROGRESS NOTES
Anuradha Godfrey is a 59 y.o. female here for Follow-up (Burning)        PCP: Munir Garcia  Referring Provider: Desi Chopra, Ruiz  [address] [place] [address] [place] [id number]     HPI:  Presents for follow-up due to GERD and constipation.  Patient reports that GERD symptoms have improved on Dexilant.  No dysphagia.  Last EGD dilation was 03/21/2024, gastritis.  Reports intermittent nausea.  In discussing the possibility of gastroparesis with the patient, the patient reports that she did have a gastric emptying study in 2014 and feels that she may have been diagnosed with gastroparesis at that time.  We did discuss a diet with small frequent meals and low residue.  We will give the patient a written gastroparesis diet today to see if this improves her symptoms of nausea.  There is also a diagnosis of celiac disease that is noted on the chart.  Patient does not follow a strict gluten free diet.  States that she had a previous diagnosis of celiac disease in 2013 or 2014.  Labs are not available for review.  Duodenal biopsy was not performed on last EGD.  We will draw celiac labs today.  Reports that constipation has improved.  She is titrating MiraLax for constipation.  No hematochezia or melena.  Colonoscopy on 03/22/2024, report reviewed, Diffuse melanosis coli is present. Some retained stool was present. Inflamed hemorrhoids were noted without bleeding.  Tubular adenoma removed from the ascending colon.    Follow-up  Associated symptoms include nausea. Pertinent negatives include no abdominal pain, change in bowel habit, chest pain, fatigue, fever or vomiting.         ROS:  Review of Systems   Constitutional:  Negative for appetite change, fatigue, fever and unexpected weight change.   HENT:  Negative for trouble swallowing.    Cardiovascular:  Negative for chest pain.   Gastrointestinal:  Positive for constipation, nausea and reflux. Negative for abdominal pain, anal bleeding, blood  in stool, change in bowel habit, diarrhea, rectal pain and vomiting.   Musculoskeletal:  Positive for gait problem.   Integumentary:  Negative for pallor.   Psychiatric/Behavioral:  The patient is not nervous/anxious.           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    PSHX:  has a past surgical history that includes Cardiac catheterization (02/04/2021); Appendectomy (Right); Cholecystectomy; Total abdominal hysterectomy; Oophorectomy; and Breast biopsy.    PFHX: family history includes Heart disease in her brother and mother; Stroke in her mother.    PSlHX:  reports that she has never smoked. She has never used smokeless tobacco. She reports that she does not currently use alcohol. She reports that she does not use drugs.        Review of patient's allergies indicates:   Allergen Reactions    Bee sting [allergen ext-venom-honey bee] Anaphylaxis    Nsaids (non-steroidal anti-inflammatory drug) Anaphylaxis    Grass pollen-alyssa, standard     Neurontin [gabapentin] Hallucinations    Topiramate      Other  reaction(s): Unknown       Medication List with Changes/Refills   Current Medications    ACETAMINOPHEN (TYLENOL) 500 MG TABLET    Take 1,000 mg by mouth every 6 (six) hours as needed for Pain.    AMITRIPTYLINE (ELAVIL) 25 MG TABLET    Take 1 tablet (25 mg total) by mouth once daily.    AMITRIPTYLINE (ELAVIL) 50 MG TABLET    Take 1 tablet (50 mg total) by mouth every evening.    ATORVASTATIN (LIPITOR) 80 MG TABLET    Take 1 tablet (80 mg total) by mouth every evening.    BLOOD SUGAR DIAGNOSTIC STRP    1 strip by Misc.(Non-Drug; Combo Route) route 3 (three) times daily.    CLOPIDOGREL (PLAVIX) 75 MG TABLET    Take 1 tablet (75 mg total) by mouth once daily.    CYCLOSPORINE (RESTASIS) 0.05 % OPHTHALMIC EMULSION    PLACE 1 DROP INTO BOTH EYES TWICE DAILY    DEXLANSOPRAZOLE (DEXILANT) 60 MG CAPSULE    Take 1 capsule (60 mg total) by mouth once daily.    DICLOFENAC SODIUM (VOLTAREN) 1 % GEL    Apply topically.    DOCUSATE SODIUM (COLACE) 50 MG CAPSULE    Take 50 mg by mouth once daily.    ESZOPICLONE (LUNESTA) 3 MG TAB    TAKE 1 TABLET BY MOUTH EVERY NIGHT AT BEDTIME AS NEEDED FOR REST ..NO ALCOHOL WHILE TAKING MEDICATION    FOSINOPRIL (MONOPRIL) 20 MG TABLET    Take 1 tablet (20 mg total) by mouth once daily.    GLIMEPIRIDE (AMARYL) 2 MG TABLET    Take 1 tablet (2 mg total) by mouth daily with breakfast.    HYDROCODONE-ACETAMINOPHEN (NORCO)  MG PER TABLET    TAKE 1 TABLET BY MOUTH 4 TIMES DAILY AS NEEDED ..MAY CAUSE DROWSINESS- AVOID ALCOHOL    ISOSORBIDE MONONITRATE (IMDUR) 30 MG 24 HR TABLET    Take 1 tablet (30 mg total) by mouth once daily.    LANTUS SOLOSTAR U-100 INSULIN 100 UNIT/ML (3 ML) INPN PEN    INJECT INJECT 35 UNITS UNDER THE SKIN EVERY NIGHT AT BEDTIME    LORATADINE (CLARITIN) 10 MG TABLET    Take 1 tablet (10 mg total) by mouth once daily.    METHOCARBAMOL (ROBAXIN) 750 MG TAB    TAKE 1 TABLET BY MOUTH 3 TIMES DAILY WITH FOOD AS NEEDED ..MAY CAUSE DROWSINESS- AVOID ALCOHOL    METOPROLOL SUCCINATE  "(TOPROL-XL) 50 MG 24 HR TABLET    Take 1 tablet (50 mg total) by mouth once daily.    MORPHINE (MS CONTIN) 15 MG 12 HR TABLET    TAKE 1 TABLET BY MOUTH EACH NIGHT AT BEDTIME ..MAY CAUSE DROWSINESS- AVOID ALCOHOL    NEOMYCIN-POLYMYXIN-HYDROCORTISONE (CORTISPORIN) 3.5-10,000-1 MG/ML-UNIT/ML-% OTIC SUSPENSION    Place 2 drops into the left ear 3 (three) times daily.    NITROGLYCERIN (NITROSTAT) 0.4 MG SL TABLET    DISSOLVE 1 TABLET UNDER TONGUE AS NEEDED FOR CHEST PAIN EVERY 5 MINUTES ..NO MORE THAN 3 TABLETS AT A TIME SEEK MEDICAL ATTENTION IF NO RELIEF    NOVOLOG MIX 70-30FLEXPEN U-100 100 UNIT/ML (70-30) INPN PEN    Inject 20 Units into the skin 2 (two) times a day.    PIOGLITAZONE (ACTOS) 15 MG TABLET    Take 1 tablet (15 mg total) by mouth once daily.    PREGABALIN (LYRICA) 200 MG CAP    TAKE 1 CAPSULE BY MOUTH 3 TIMES DAILY ..MAY CAUSE DROWSINESS- AVOID ALCOHOL    PROMETHAZINE (PHENERGAN) 50 MG TABLET    TAKE 1 TABLET BY MOUTH EVERY 4-6 HOURS AS NEEDED FOR NAUSEA *AVOID ALCOHOL *CAUSES DROWSINESS*    UBROGEPANT (UBRELVY) 100 MG TABLET    Take 1 tablet (100 mg total) by mouth as needed for Migraine. If symptoms persist or return, may repeat dose after 2 hours. Maximum: 200 mg per 24 hours   Discontinued Medications    AMOXICILLIN (AMOXIL) 500 MG TAB    Take 1 tablet (500 mg total) by mouth 3 (three) times daily.        Objective Findings:  Vital Signs:  /65   Pulse 85   Ht 5' 7" (1.702 m)   SpO2 96%   BMI 34.30 kg/m²  Body mass index is 34.3 kg/m².    Physical Exam:  Physical Exam  Vitals and nursing note reviewed.   Constitutional:       General: She is not in acute distress.     Appearance: Normal appearance.   HENT:      Mouth/Throat:      Mouth: Mucous membranes are moist.   Cardiovascular:      Rate and Rhythm: Normal rate.   Pulmonary:      Effort: Pulmonary effort is normal.   Abdominal:      General: Bowel sounds are normal. There is no distension.      Palpations: Abdomen is soft. There is no " mass.      Tenderness: There is no abdominal tenderness.   Musculoskeletal:      Left Lower Extremity: Left leg is amputated above knee.   Skin:     General: Skin is warm and dry.      Coloration: Skin is not jaundiced or pale.   Neurological:      Mental Status: She is alert and oriented to person, place, and time.      Gait: Gait abnormal.   Psychiatric:         Mood and Affect: Mood normal.          Labs:  Lab Results   Component Value Date    WBC 10.36 12/03/2024    HGB 13.1 12/03/2024    HCT 42.6 12/03/2024    MCV 96.6 (H) 12/03/2024    RDW 14.7 (H) 12/03/2024     12/03/2024    LYMPH 32.3 12/03/2024    LYMPH 3.35 12/03/2024    MONO 7.5 (H) 12/03/2024    EOS 0.21 12/03/2024    BASO 0.06 12/03/2024     Lab Results   Component Value Date     04/30/2024    K 3.7 04/30/2024     (H) 04/30/2024    CO2 24 04/30/2024     (H) 04/30/2024    BUN 9 04/30/2024    CREATININE 0.51 (L) 04/30/2024    CALCIUM 9.3 04/30/2024    PROT 7.2 03/20/2024    ALBUMIN 2.8 (L) 03/20/2024    BILITOT 0.3 03/20/2024    ALKPHOS 103 03/20/2024    AST 22 03/20/2024    ALT 18 03/20/2024         Imaging: Mammo Digital Screening Bilat w/ [place]  Result Date: 5/9/2025  Facility: [address] [address] [phone] Name: Anuradha Godfrey MRN: [id number] Result: Mammo Digital Screening Bilat w/ [place]  History: Patient is 59 y.o. and is seen for other screening mammogram. Films Compared: Compared to: 09/26/2024 US Breast Bilateral Complete, 05/01/2024 Mammo Digital Screening Bilat w/ [place], 11/29/2022 Mammo Digital Screening Bilat, and 11/18/2021 Mammo Digital Screening Bilat  Findings: This procedure was performed using tomosynthesis. Computer-aided detection was utilized in the interpretation of this examination. There are scattered areas of fibroglandular density. Left There is an asymmetry seen in the outer region of the left breast on the CC (slice 17) view. Right There is no evidence of  "suspicious masses, calcifications, or other abnormal findings in the right breast.     Left Asymmetry: Left breast asymmetry in the outer region. Assessment: 0 - Incomplete. Diagnostic Mammogram and/or Ultrasound is recommended. Right There is no mammographic evidence of malignancy in the right breast. BI-RADS Category: Overall: 0 - Incomplete: Needs Additional Imaging Evaluation  Recommendation: Diagnostic mammogram with possible ultrasound (if indicated) is recommended. Your estimated lifetime risk of breast cancer (to age 85) based on Tyrer-Cuzick risk assessment model is 5.63%.  According to the [employer] Society, patients with a lifetime breast cancer risk of 20% or higher might benefit from supplemental screening tests, such as screening breast MRI. Electronically signed by, Abdulkadir Bill MD         Assessment:  Anuradha Godfrey is a 59 y.o. female here with:  1. Gastroesophageal reflux disease, unspecified whether esophagitis present    2. Nausea    3. Celiac disease    4. Constipation, unspecified constipation type          Recommendations:  1. Gastroparesis diet  2. Celiac labs, CBC, CMP  3. Do not lay down within 3 hours of eating.  Avoid spicy, greasy foods  Eat 6-8 small meals per day.  Exercise 150 minutes per week  Avoid raw fruits and vegetables  Small amount of protein and fiber at one time    Portions of this note may have been created with voice recognition software.  Occasional wrong word or "sound a like substitutions may have occurred due to inherent limitations of voice recognition software.  Please read the note carefully and recognize using contexts, where substitutions have occurred.    Diagnosis, risks, benefits, and side effects of any medications and treatment plan were discussed with the patient.  All questions were answered to the satisfaction of the patient, and patient verbalized understanding and agreement to the treatment plan.          Follow up in about 6 months " (around 11/16/2025).      Order summary:  Orders Placed This Encounter    Tissue Transglutaminase Ab, IgA    Gliadin (Deamidated) Ab, Eval    IgA    Endomysial antibody, IgA titer    CBC Auto Differential    Comprehensive Metabolic Panel       Thank you for allowing me to participate in the care of Anuradha Godfrey.      JOHN Vega

## 2025-05-16 NOTE — PROGRESS NOTES
Subjective:       Patient ID: Anuradha Gdofrey is a 59 y.o. female     Chief Complaint:    Chief Complaint   Patient presents with    Follow-up        Allergies:  Bee sting [allergen ext-venom-honey bee]; Nsaids (non-steroidal anti-inflammatory drug); Grass pollen-alyssa, standard; Neurontin [gabapentin]; and Topiramate    Current Medications:    Outpatient Encounter Medications as of 5/16/2025   Medication Sig Dispense Refill    acetaminophen (TYLENOL) 500 MG tablet Take 1,000 mg by mouth every 6 (six) hours as needed for Pain.      atorvastatin (LIPITOR) 80 MG tablet Take 1 tablet (80 mg total) by mouth every evening. 90 tablet 1    blood sugar diagnostic Strp 1 strip by Misc.(Non-Drug; Combo Route) route 3 (three) times daily. 200 strip 6    clopidogreL (PLAVIX) 75 mg tablet Take 1 tablet (75 mg total) by mouth once daily. 90 tablet 1    cycloSPORINE (RESTASIS) 0.05 % ophthalmic emulsion PLACE 1 DROP INTO BOTH EYES TWICE DAILY 60 each 3    dexlansoprazole (DEXILANT) 60 mg capsule Take 1 capsule (60 mg total) by mouth once daily. 90 capsule 1    diclofenac sodium (VOLTAREN) 1 % Gel Apply topically.      docusate sodium (COLACE) 50 MG capsule Take 50 mg by mouth once daily.      eszopiclone (LUNESTA) 3 mg Tab TAKE 1 TABLET BY MOUTH EVERY NIGHT AT BEDTIME AS NEEDED FOR REST ..NO ALCOHOL WHILE TAKING MEDICATION 30 tablet 1    fosinopriL (MONOPRIL) 20 MG tablet Take 1 tablet (20 mg total) by mouth once daily. 90 tablet 6    glimepiride (AMARYL) 2 MG tablet Take 1 tablet (2 mg total) by mouth daily with breakfast. 90 tablet 1    HYDROcodone-acetaminophen (NORCO)  mg per tablet TAKE 1 TABLET BY MOUTH 4 TIMES DAILY AS NEEDED ..MAY CAUSE DROWSINESS- AVOID ALCOHOL      isosorbide mononitrate (IMDUR) 30 MG 24 hr tablet Take 1 tablet (30 mg total) by mouth once daily. 90 tablet 6    LANTUS SOLOSTAR U-100 INSULIN 100 unit/mL (3 mL) InPn pen INJECT INJECT 35 UNITS UNDER THE SKIN EVERY NIGHT AT BEDTIME 12 mL 4     loratadine (CLARITIN) 10 mg tablet Take 1 tablet (10 mg total) by mouth once daily. 20 tablet 3    methocarbamoL (ROBAXIN) 750 MG Tab TAKE 1 TABLET BY MOUTH 3 TIMES DAILY WITH FOOD AS NEEDED ..MAY CAUSE DROWSINESS- AVOID ALCOHOL      metoprolol succinate (TOPROL-XL) 50 MG 24 hr tablet Take 1 tablet (50 mg total) by mouth once daily. 90 tablet 6    morphine (MS CONTIN) 15 MG 12 hr tablet TAKE 1 TABLET BY MOUTH EACH NIGHT AT BEDTIME ..MAY CAUSE DROWSINESS- AVOID ALCOHOL      neomycin-polymyxin-hydrocortisone (CORTISPORIN) 3.5-10,000-1 mg/mL-unit/mL-% otic suspension Place 2 drops into the left ear 3 (three) times daily. 10 mL 0    nitroGLYCERIN (NITROSTAT) 0.4 MG SL tablet DISSOLVE 1 TABLET UNDER TONGUE AS NEEDED FOR CHEST PAIN EVERY 5 MINUTES ..NO MORE THAN 3 TABLETS AT A TIME SEEK MEDICAL ATTENTION IF NO RELIEF 25 tablet 2    NOVOLOG MIX 70-30FLEXPEN U-100 100 unit/mL (70-30) InPn pen Inject 20 Units into the skin 2 (two) times a day. 12 mL 12    pioglitazone (ACTOS) 15 MG tablet Take 1 tablet (15 mg total) by mouth once daily. 90 tablet 1    pregabalin (LYRICA) 200 MG Cap TAKE 1 CAPSULE BY MOUTH 3 TIMES DAILY ..MAY CAUSE DROWSINESS- AVOID ALCOHOL 90 capsule 0    promethazine (PHENERGAN) 50 MG tablet TAKE 1 TABLET BY MOUTH EVERY 4-6 HOURS AS NEEDED FOR NAUSEA *AVOID ALCOHOL *CAUSES DROWSINESS* (Patient taking differently: daily as needed. TAKE 1 TABLET BY MOUTH EVERY 4-6 HOURS AS NEEDED FOR NAUSEA *AVOID ALCOHOL *CAUSES DROWSINESS*) 60 tablet 1    [DISCONTINUED] amitriptyline (ELAVIL) 25 MG tablet Take 1 tablet (25 mg total) by mouth once daily. 180 tablet 1    [DISCONTINUED] amitriptyline (ELAVIL) 50 MG tablet Take 1 tablet (50 mg total) by mouth every evening. 90 tablet 0    [DISCONTINUED] ubrogepant (UBRELVY) 100 mg tablet Take 1 tablet (100 mg total) by mouth as needed for Migraine. If symptoms persist or return, may repeat dose after 2 hours. Maximum: 200 mg per 24 hours 8 tablet 5    amitriptyline (ELAVIL) 25  MG tablet Take 1 tablet (25 mg total) by mouth once daily. 180 tablet 1    amitriptyline (ELAVIL) 50 MG tablet Take 1 tablet (50 mg total) by mouth every evening. 90 tablet 0    ubrogepant (UBRELVY) 100 mg tablet Take 1 tablet (100 mg total) by mouth as needed for Migraine. If symptoms persist or return, may repeat dose after 2 hours. Maximum: 200 mg per 24 hours 8 tablet 5    [DISCONTINUED] amoxicillin (AMOXIL) 500 MG Tab Take 1 tablet (500 mg total) by mouth 3 (three) times daily. 21 tablet 0    [DISCONTINUED] atorvastatin (LIPITOR) 80 MG tablet Take 1 tablet (80 mg total) by mouth every evening. 90 tablet 1    [DISCONTINUED] blood sugar diagnostic Strp 1 strip by Misc.(Non-Drug; Combo Route) route 3 (three) times daily. 200 strip 6    [DISCONTINUED] clopidogreL (PLAVIX) 75 mg tablet TAKE 1 TABLET BY MOUTH ONCE DAILY 90 tablet 1    [DISCONTINUED] cycloSPORINE (RESTASIS) 0.05 % ophthalmic emulsion PLACE 1 DROP INTO BOTH EYES TWICE DAILY 60 each 3    [DISCONTINUED] eszopiclone (LUNESTA) 3 mg Tab TAKE 1 TABLET BY MOUTH EVERY NIGHT AT BEDTIME AS NEEDED FOR REST ..NO ALCOHOL WHILE TAKING MEDICATION 30 tablet 1    [DISCONTINUED] fosinopriL (MONOPRIL) 20 MG tablet Take 1 tablet (20 mg total) by mouth once daily. 90 tablet 6    [DISCONTINUED] glimepiride (AMARYL) 2 MG tablet Take 1 tablet (2 mg total) by mouth daily with breakfast. 90 tablet 1    [DISCONTINUED] isosorbide mononitrate (IMDUR) 30 MG 24 hr tablet Take 1 tablet (30 mg total) by mouth once daily. 90 tablet 6    [DISCONTINUED] LANTUS SOLOSTAR U-100 INSULIN 100 unit/mL (3 mL) InPn pen INJECT INJECT 35 UNITS UNDER THE SKIN EVERY NIGHT AT BEDTIME 12 mL 4    [DISCONTINUED] loratadine (CLARITIN) 10 mg tablet Take 1 tablet (10 mg total) by mouth once daily. 20 tablet 3    [DISCONTINUED] metoprolol succinate (TOPROL-XL) 50 MG 24 hr tablet Take 1 tablet (50 mg total) by mouth once daily. 90 tablet 6    [DISCONTINUED] NOVOLOG MIX 70-30FLEXPEN U-100 100 unit/mL (70-30)  InPn pen Inject 20 Units into the skin 2 (two) times a day. 12 mL 12    [DISCONTINUED] pioglitazone (ACTOS) 15 MG tablet Take 1 tablet (15 mg total) by mouth once daily. 90 tablet 1    [DISCONTINUED] pregabalin (LYRICA) 200 MG Cap TAKE 1 CAPSULE BY MOUTH 3 TIMES DAILY ..MAY CAUSE DROWSINESS- AVOID ALCOHOL 90 capsule 0    [DISCONTINUED] ubrogepant (UBRELVY) 100 mg tablet Take 1 tablet (100 mg total) by mouth as needed for Migraine. If symptoms persist or return, may repeat dose after 2 hours. Maximum: 200 mg per 24 hours 8 tablet 5     No facility-administered encounter medications on file as of 5/16/2025.       History of Present Illness  58 y/o WF following in neurology for reported migraine headaches, neuropathy, and reported memory impairment.    Prior was on imitrex and zomig to take PRN, however had history of CAD and thus it was not recommended, actually contraindicated.  Was approved for Ubrelvy to use instead and reported it was beneficial.  I have now been able to get her back on it to use PRN and it continues to work well for her.    Denies overuse headaches    MIDAS score currently 18    Long standing history of migraines.  She was actually seen by Dr. Galo in 2000 through 2014 for her migraines.  She was prior treated with pamelor and topamax.  She had reaction to the topamax.  Pamelor was effective but somewhere along the way was changed to Elavil currently at 75mg at that time which she is still currently on.  She is already on beta blocker metoprolol for her HTN management.  Thus this is x3 preventive treatments, but her insurance would not cover CGRP treatment for prevention.    She is poorly controlled diabetic per her report, and has LLE AKA amputation due this.     Initial MMSE was 29/30, was at that time still being evaluated for sleep apnea and was found to have sleep apnea but does not use her C-pap though we have discussed the importance of this.    She was also on multiple medications which  can contribute to cognitive impairment including norco, MS contin, lyrica, and lunesta.             Review of Systems  ROS   Objective:           Physical Exam     Assessment:     Problem List Items Addressed This Visit          Neuro    Chronic migraine without aura - Primary    Relevant Medications    amitriptyline (ELAVIL) 25 MG tablet    ubrogepant (UBRELVY) 100 mg tablet       Pulmonary    Cough    Relevant Medications    amitriptyline (ELAVIL) 50 MG tablet       Endocrine    Type 2 diabetes mellitus, without long-term current use of insulin    Relevant Medications    amitriptyline (ELAVIL) 50 MG tablet          Primary Diagnosis and ICD10  Intractable chronic migraine without aura and without status migrainosus [G43.719]    Plan:     Patient Instructions   Continue the current elavil 75mg nightly  Continue current ubrelvy as needed  Prior failed imitrex and zomig but also with CAD which is contraindication for the triptans    Medications Discontinued During This Encounter   Medication Reason    amitriptyline (ELAVIL) 25 MG tablet Reorder    amitriptyline (ELAVIL) 50 MG tablet Reorder    ubrogepant (UBRELVY) 100 mg tablet Reorder         Requested Prescriptions     Signed Prescriptions Disp Refills    amitriptyline (ELAVIL) 25 MG tablet 180 tablet 1     Sig: Take 1 tablet (25 mg total) by mouth once daily.    amitriptyline (ELAVIL) 50 MG tablet 90 tablet 0     Sig: Take 1 tablet (50 mg total) by mouth every evening.    ubrogepant (UBRELVY) 100 mg tablet 8 tablet 5     Sig: Take 1 tablet (100 mg total) by mouth as needed for Migraine. If symptoms persist or return, may repeat dose after 2 hours. Maximum: 200 mg per 24 hours       No orders of the defined types were placed in this encounter.

## 2025-05-20 ENCOUNTER — HOSPITAL ENCOUNTER (OUTPATIENT)
Dept: RADIOLOGY | Facility: HOSPITAL | Age: 60
Discharge: HOME | End: 2025-05-20
Attending: RADIOLOGY
Payer: MEDICARE

## 2025-05-20 ENCOUNTER — RESULTS FOLLOW-UP (OUTPATIENT)
Dept: GASTROENTEROLOGY | Facility: CLINIC | Age: 60
End: 2025-05-20

## 2025-05-20 DIAGNOSIS — R92.8 ABNORMAL MAMMOGRAM: ICD-10-CM

## 2025-05-20 PROCEDURE — 77065 DX MAMMO INCL CAD UNI: CPT | Mod: 26,LT,, | Performed by: RADIOLOGY

## 2025-05-20 PROCEDURE — 76642 ULTRASOUND BREAST LIMITED: CPT | Mod: TC,LT

## 2025-05-20 PROCEDURE — 77065 DX MAMMO INCL CAD UNI: CPT | Mod: TC,LT

## 2025-05-20 PROCEDURE — 76642 ULTRASOUND BREAST LIMITED: CPT | Mod: 26,LT,, | Performed by: RADIOLOGY

## 2025-05-20 PROCEDURE — 77061 BREAST TOMOSYNTHESIS UNI: CPT | Mod: 26,LT,, | Performed by: RADIOLOGY

## 2025-05-30 ENCOUNTER — TELEPHONE (OUTPATIENT)
Dept: NEUROLOGY | Facility: CLINIC | Age: 60
End: 2025-05-30
Payer: MEDICARE

## 2025-05-30 NOTE — TELEPHONE ENCOUNTER
Spoke w/ , I let her know that it looks like a PA was not needed for her Ubrelvy. She stated that she can't see anything on her [facility] dany. I told her that I would send a message to our PA  and see if there was a different update than what I could see and if it was different I would give her a call back. She verbalized thanks.

## 2025-05-30 NOTE — TELEPHONE ENCOUNTER
Copied from CRM #[id number]. Topic: General Inquiry - Patient Advice  >> May 30, 2025  8:24 AM Ginny wrote:  Who Called: Anuradha Godfrey    Caller is requesting assistance/information from provider's office.    PT is requesting to speak w nurse about prior approval information     Preferred Method of Contact: Phone Call  Patient's Preferred Phone Number on File: [phone]   Best Call Back Number, if different:  Additional Information:

## 2025-06-03 ENCOUNTER — OFFICE VISIT (OUTPATIENT)
Dept: CARDIOLOGY | Facility: CLINIC | Age: 60
End: 2025-06-03
Payer: MEDICARE

## 2025-06-03 VITALS
SYSTOLIC BLOOD PRESSURE: 150 MMHG | HEART RATE: 90 BPM | DIASTOLIC BLOOD PRESSURE: 80 MMHG | HEIGHT: 67 IN | WEIGHT: 217 LBS | BODY MASS INDEX: 34.06 KG/M2 | RESPIRATION RATE: 18 BRPM

## 2025-06-03 DIAGNOSIS — I10 ESSENTIAL (PRIMARY) HYPERTENSION: Primary | ICD-10-CM

## 2025-06-03 DIAGNOSIS — I10 ESSENTIAL (PRIMARY) HYPERTENSION: ICD-10-CM

## 2025-06-03 DIAGNOSIS — I25.10 ATHEROSCLEROSIS OF NATIVE CORONARY ARTERY OF NATIVE HEART WITHOUT ANGINA PECTORIS: ICD-10-CM

## 2025-06-03 PROCEDURE — 99999 PR PBB SHADOW E&M-EST. PATIENT-LVL V: CPT | Mod: PBBFAC,,, | Performed by: STUDENT IN AN ORGANIZED HEALTH CARE EDUCATION/TRAINING PROGRAM

## 2025-06-03 PROCEDURE — 99214 OFFICE O/P EST MOD 30 MIN: CPT | Mod: S$PBB,,, | Performed by: STUDENT IN AN ORGANIZED HEALTH CARE EDUCATION/TRAINING PROGRAM

## 2025-06-03 PROCEDURE — 99215 OFFICE O/P EST HI 40 MIN: CPT | Mod: PBBFAC | Performed by: STUDENT IN AN ORGANIZED HEALTH CARE EDUCATION/TRAINING PROGRAM

## 2025-06-03 RX ORDER — ISOSORBIDE MONONITRATE 60 MG/1
60 TABLET, EXTENDED RELEASE ORAL DAILY
Qty: 90 TABLET | Refills: 3 | Status: SHIPPED | OUTPATIENT
Start: 2025-06-03

## 2025-07-10 ENCOUNTER — OFFICE VISIT (OUTPATIENT)
Dept: FAMILY MEDICINE | Facility: CLINIC | Age: 60
End: 2025-07-10
Payer: MEDICARE

## 2025-07-10 VITALS
HEART RATE: 82 BPM | HEIGHT: 67 IN | RESPIRATION RATE: 20 BRPM | SYSTOLIC BLOOD PRESSURE: 132 MMHG | TEMPERATURE: 99 F | WEIGHT: 233 LBS | OXYGEN SATURATION: 96 % | DIASTOLIC BLOOD PRESSURE: 68 MMHG | BODY MASS INDEX: 36.57 KG/M2

## 2025-07-10 DIAGNOSIS — E78.2 MIXED HYPERLIPIDEMIA: Chronic | ICD-10-CM

## 2025-07-10 DIAGNOSIS — I10 ESSENTIAL (PRIMARY) HYPERTENSION: Chronic | ICD-10-CM

## 2025-07-10 DIAGNOSIS — R13.19 ESOPHAGEAL DYSPHAGIA: ICD-10-CM

## 2025-07-10 DIAGNOSIS — I25.10 ATHEROSCLEROSIS OF NATIVE CORONARY ARTERY OF NATIVE HEART WITHOUT ANGINA PECTORIS: Chronic | ICD-10-CM

## 2025-07-10 DIAGNOSIS — E11.69 TYPE 2 DIABETES MELLITUS WITH OTHER SPECIFIED COMPLICATION, WITHOUT LONG-TERM CURRENT USE OF INSULIN: Primary | Chronic | ICD-10-CM

## 2025-07-10 DIAGNOSIS — K21.9 GASTROESOPHAGEAL REFLUX DISEASE WITHOUT ESOPHAGITIS: Chronic | ICD-10-CM

## 2025-07-10 DIAGNOSIS — J30.1 NON-SEASONAL ALLERGIC RHINITIS DUE TO POLLEN: Chronic | ICD-10-CM

## 2025-07-10 DIAGNOSIS — I73.9 PERIPHERAL ARTERY DISEASE: Chronic | ICD-10-CM

## 2025-07-10 DIAGNOSIS — E55.9 VITAMIN D DEFICIENCY: ICD-10-CM

## 2025-07-10 DIAGNOSIS — E66.01 SEVERE OBESITY (BMI 35.0-39.9) WITH COMORBIDITY: Chronic | ICD-10-CM

## 2025-07-10 DIAGNOSIS — F51.01 PRIMARY INSOMNIA: Chronic | ICD-10-CM

## 2025-07-10 DIAGNOSIS — I50.22 CHRONIC SYSTOLIC HEART FAILURE: Chronic | ICD-10-CM

## 2025-07-10 DIAGNOSIS — Z89.612 ACQUIRED ABSENCE OF LEFT LEG ABOVE KNEE: ICD-10-CM

## 2025-07-10 DIAGNOSIS — Z79.899 OTHER LONG TERM (CURRENT) DRUG THERAPY: ICD-10-CM

## 2025-07-10 DIAGNOSIS — E11.51 TYPE 2 DIABETES MELLITUS WITH DIABETIC PERIPHERAL ANGIOPATHY WITHOUT GANGRENE, WITHOUT LONG-TERM CURRENT USE OF INSULIN: Chronic | ICD-10-CM

## 2025-07-10 DIAGNOSIS — Z11.4 SCREENING FOR HIV (HUMAN IMMUNODEFICIENCY VIRUS): ICD-10-CM

## 2025-07-10 PROBLEM — J06.9 UPPER RESPIRATORY TRACT INFECTION: Status: RESOLVED | Noted: 2023-10-03 | Resolved: 2025-07-10

## 2025-07-10 PROBLEM — G89.4 CHRONIC PAIN SYNDROME: Chronic | Status: ACTIVE | Noted: 2024-03-20

## 2025-07-10 PROBLEM — D50.0 IRON DEFICIENCY ANEMIA DUE TO CHRONIC BLOOD LOSS: Status: RESOLVED | Noted: 2024-03-20 | Resolved: 2025-07-10

## 2025-07-10 PROBLEM — R05.9 COUGH: Status: RESOLVED | Noted: 2024-01-30 | Resolved: 2025-07-10

## 2025-07-10 PROBLEM — H92.02 LEFT EAR PAIN: Status: RESOLVED | Noted: 2024-08-21 | Resolved: 2025-07-10

## 2025-07-10 PROBLEM — R00.0 SINUS TACHYCARDIA: Status: RESOLVED | Noted: 2022-11-17 | Resolved: 2025-07-10

## 2025-07-10 PROBLEM — Z12.72 SCREENING FOR MALIGNANT NEOPLASM OF VAGINA AFTER TOTAL HYSTERECTOMY: Status: RESOLVED | Noted: 2021-06-05 | Resolved: 2025-07-10

## 2025-07-10 PROBLEM — Z90.710 SCREENING FOR MALIGNANT NEOPLASM OF VAGINA AFTER TOTAL HYSTERECTOMY: Status: RESOLVED | Noted: 2021-06-05 | Resolved: 2025-07-10

## 2025-07-10 PROBLEM — D64.9 ANEMIA: Status: RESOLVED | Noted: 2024-03-20 | Resolved: 2025-07-10

## 2025-07-10 PROBLEM — E03.8 TSH DEFICIENCY: Status: RESOLVED | Noted: 2021-09-24 | Resolved: 2025-07-10

## 2025-07-10 PROBLEM — E53.8 VITAMIN B12 DEFICIENCY: Status: RESOLVED | Noted: 2021-09-24 | Resolved: 2025-07-10

## 2025-07-10 LAB
25(OH)D3 SERPL-MCNC: 16 NG/ML (ref 30–80)
ALBUMIN SERPL BCP-MCNC: 3.6 G/DL (ref 3.4–4.8)
ALBUMIN/GLOB SERPL: 0.8 {RATIO}
ALP SERPL-CCNC: 64 U/L (ref 40–150)
ALT SERPL W P-5'-P-CCNC: 14 U/L
ANION GAP SERPL CALCULATED.3IONS-SCNC: 11 MMOL/L (ref 7–16)
AST SERPL W P-5'-P-CCNC: 26 U/L (ref 11–45)
BASOPHILS # BLD AUTO: 0.04 K/UL (ref 0–0.2)
BASOPHILS NFR BLD AUTO: 0.4 % (ref 0–1)
BILIRUB SERPL-MCNC: 0.3 MG/DL
BUN SERPL-MCNC: 11 MG/DL (ref 10–20)
BUN/CREAT SERPL: 14 (ref 6–20)
CALCIUM SERPL-MCNC: 9.3 MG/DL (ref 8.4–10.2)
CHLORIDE SERPL-SCNC: 104 MMOL/L (ref 98–107)
CHOLEST SERPL-MCNC: 271 MG/DL
CHOLEST/HDLC SERPL: 8 {RATIO}
CO2 SERPL-SCNC: 29 MMOL/L (ref 23–31)
CREAT SERPL-MCNC: 0.79 MG/DL (ref 0.55–1.02)
CREAT UR-MCNC: 72 MG/DL (ref 15–325)
DIFFERENTIAL METHOD BLD: ABNORMAL
EGFR (NO RACE VARIABLE) (RUSH/TITUS): 86 ML/MIN/1.73M2
EOSINOPHIL # BLD AUTO: 0.28 K/UL (ref 0–0.5)
EOSINOPHIL NFR BLD AUTO: 2.9 % (ref 1–4)
ERYTHROCYTE [DISTWIDTH] IN BLOOD BY AUTOMATED COUNT: 14.7 % (ref 11.5–14.5)
EST. AVERAGE GLUCOSE BLD GHB EST-MCNC: 137 MG/DL
FOLATE SERPL-MCNC: 7.9 NG/ML (ref 7–31.4)
GLOBULIN SER-MCNC: 4.5 G/DL (ref 2–4)
GLUCOSE SERPL-MCNC: 77 MG/DL (ref 82–115)
HBA1C MFR BLD HPLC: 6.4 %
HCT VFR BLD AUTO: 44.6 % (ref 38–47)
HDLC SERPL-MCNC: 34 MG/DL (ref 35–60)
HGB BLD-MCNC: 14.2 G/DL (ref 12–16)
HIV 1+O+2 AB SERPL QL: NORMAL
IMM GRANULOCYTES # BLD AUTO: 0.04 K/UL (ref 0–0.04)
IMM GRANULOCYTES NFR BLD: 0.4 % (ref 0–0.4)
LDLC SERPL CALC-MCNC: 169 MG/DL
LDLC/HDLC SERPL: 5 {RATIO}
LYMPHOCYTES # BLD AUTO: 3 K/UL (ref 1–4.8)
LYMPHOCYTES NFR BLD AUTO: 31.3 % (ref 27–41)
MCH RBC QN AUTO: 29.8 PG (ref 27–31)
MCHC RBC AUTO-ENTMCNC: 31.8 G/DL (ref 32–36)
MCV RBC AUTO: 93.7 FL (ref 80–96)
MICROALBUMIN UR-MCNC: 0.6 MG/DL
MICROALBUMIN/CREAT RATIO PNL UR: 8.3 MG/G (ref 0–30)
MONOCYTES # BLD AUTO: 0.78 K/UL (ref 0–0.8)
MONOCYTES NFR BLD AUTO: 8.2 % (ref 2–6)
MPC BLD CALC-MCNC: 10.9 FL (ref 9.4–12.4)
NEUTROPHILS # BLD AUTO: 5.43 K/UL (ref 1.8–7.7)
NEUTROPHILS NFR BLD AUTO: 56.8 % (ref 53–65)
NONHDLC SERPL-MCNC: 237 MG/DL
NRBC # BLD AUTO: 0 X10E3/UL
NRBC, AUTO (.00): 0 %
PLATELET # BLD AUTO: 290 K/UL (ref 150–400)
POTASSIUM SERPL-SCNC: 3.7 MMOL/L (ref 3.5–5.1)
PROT SERPL-MCNC: 8.1 G/DL (ref 5.8–7.6)
RBC # BLD AUTO: 4.76 M/UL (ref 4.2–5.4)
SODIUM SERPL-SCNC: 140 MMOL/L (ref 136–145)
TRIGL SERPL-MCNC: 340 MG/DL (ref 37–140)
TSH SERPL DL<=0.005 MIU/L-ACNC: 2.72 UIU/ML (ref 0.35–4.94)
VIT B12 SERPL-MCNC: 256 PG/ML (ref 213–816)
VLDLC SERPL-MCNC: 68 MG/DL
WBC # BLD AUTO: 9.57 K/UL (ref 4.5–11)

## 2025-07-10 PROCEDURE — 82306 VITAMIN D 25 HYDROXY: CPT | Mod: ,,, | Performed by: CLINICAL MEDICAL LABORATORY

## 2025-07-10 PROCEDURE — 82043 UR ALBUMIN QUANTITATIVE: CPT | Mod: ,,, | Performed by: CLINICAL MEDICAL LABORATORY

## 2025-07-10 PROCEDURE — 84443 ASSAY THYROID STIM HORMONE: CPT | Mod: ,,, | Performed by: CLINICAL MEDICAL LABORATORY

## 2025-07-10 PROCEDURE — 82746 ASSAY OF FOLIC ACID SERUM: CPT | Mod: ,,, | Performed by: CLINICAL MEDICAL LABORATORY

## 2025-07-10 PROCEDURE — 87389 HIV-1 AG W/HIV-1&-2 AB AG IA: CPT | Mod: ,,, | Performed by: CLINICAL MEDICAL LABORATORY

## 2025-07-10 PROCEDURE — 82570 ASSAY OF URINE CREATININE: CPT | Mod: ,,, | Performed by: CLINICAL MEDICAL LABORATORY

## 2025-07-10 PROCEDURE — 80061 LIPID PANEL: CPT | Mod: ,,, | Performed by: CLINICAL MEDICAL LABORATORY

## 2025-07-10 PROCEDURE — 82607 VITAMIN B-12: CPT | Mod: ,,, | Performed by: CLINICAL MEDICAL LABORATORY

## 2025-07-10 PROCEDURE — 85025 COMPLETE CBC W/AUTO DIFF WBC: CPT | Mod: ,,, | Performed by: CLINICAL MEDICAL LABORATORY

## 2025-07-10 PROCEDURE — 83036 HEMOGLOBIN GLYCOSYLATED A1C: CPT | Mod: ,,, | Performed by: CLINICAL MEDICAL LABORATORY

## 2025-07-10 PROCEDURE — 80053 COMPREHEN METABOLIC PANEL: CPT | Mod: ,,, | Performed by: CLINICAL MEDICAL LABORATORY

## 2025-07-10 RX ORDER — INSULIN ASPART 100 [IU]/ML
20 INJECTION, SUSPENSION SUBCUTANEOUS 2 TIMES DAILY PRN
Qty: 36 ML | Refills: 3 | Status: SHIPPED | OUTPATIENT
Start: 2025-07-10

## 2025-07-10 RX ORDER — LORATADINE 10 MG/1
10 TABLET ORAL DAILY
Qty: 90 TABLET | Refills: 3 | Status: SHIPPED | OUTPATIENT
Start: 2025-07-10

## 2025-07-10 RX ORDER — ATORVASTATIN CALCIUM 80 MG/1
80 TABLET, FILM COATED ORAL NIGHTLY
Qty: 90 TABLET | Refills: 3 | Status: SHIPPED | OUTPATIENT
Start: 2025-07-10

## 2025-07-10 RX ORDER — CLOPIDOGREL BISULFATE 75 MG/1
75 TABLET ORAL DAILY
Qty: 90 TABLET | Refills: 3 | Status: SHIPPED | OUTPATIENT
Start: 2025-07-10

## 2025-07-10 RX ORDER — FOSINOPRIL SODIUM 20 MG/1
20 TABLET ORAL DAILY
Qty: 90 TABLET | Refills: 3 | Status: SHIPPED | OUTPATIENT
Start: 2025-07-10

## 2025-07-10 RX ORDER — GLIMEPIRIDE 2 MG/1
2 TABLET ORAL
Qty: 90 TABLET | Refills: 1 | Status: SHIPPED | OUTPATIENT
Start: 2025-07-10

## 2025-07-10 RX ORDER — DEXLANSOPRAZOLE 60 MG/1
60 CAPSULE, DELAYED RELEASE ORAL DAILY
Qty: 90 CAPSULE | Refills: 3 | Status: SHIPPED | OUTPATIENT
Start: 2025-07-10

## 2025-07-10 RX ORDER — ESZOPICLONE 3 MG/1
3 TABLET, FILM COATED ORAL NIGHTLY PRN
Qty: 30 TABLET | Refills: 2 | Status: SHIPPED | OUTPATIENT
Start: 2025-07-10

## 2025-07-10 RX ORDER — METOPROLOL SUCCINATE 50 MG/1
50 TABLET, EXTENDED RELEASE ORAL DAILY
Qty: 90 TABLET | Refills: 3 | Status: SHIPPED | OUTPATIENT
Start: 2025-07-10

## 2025-07-10 RX ORDER — PROMETHAZINE HYDROCHLORIDE 50 MG/1
25 TABLET ORAL EVERY 6 HOURS PRN
Qty: 60 TABLET | Refills: 1 | Status: SHIPPED | OUTPATIENT
Start: 2025-07-10

## 2025-07-10 RX ORDER — PIOGLITAZONE 15 MG/1
15 TABLET ORAL DAILY
Qty: 90 TABLET | Refills: 1 | Status: SHIPPED | OUTPATIENT
Start: 2025-07-10

## 2025-07-10 RX ORDER — INSULIN GLARGINE 100 [IU]/ML
40 INJECTION, SOLUTION SUBCUTANEOUS NIGHTLY
Qty: 36 ML | Refills: 3 | Status: SHIPPED | OUTPATIENT
Start: 2025-07-10

## 2025-07-10 NOTE — LETTER
July 10, 2025    Anuradha Godfrey  [address] [facility] [facility] [place] [address]             [facility]  Family Medicine  [address] [place] [address] [address] [place] [address]  Phone: [phone]  Fax: [phone]   July 10, 2025     Patient: Anuradha Godfrey   YOB: 1965   Date of Visit: 7/10/2025       To Whom it May Concern:    Anuradha Godfrey is a new patient to me but has had a long standing history of documented anxiety/ depression. She should be allowed to have her dog with her at all times.      If you have any questions or concerns, please don't hesitate to call.    Sincerely,         Anne-Marie Neville, MARYAMP

## 2025-07-10 NOTE — PROGRESS NOTES
[facility]  [address] [place] [address]  Phone: [phone]  Fax: [phone]       PATIENT NAME: Anuradha Godfrey   : 1965    AGE: 59 y.o. DATE OF ENCOUNTER: 7/10/25    MRN: [id number]      PCP: Anne-Marie Neville FNP    Subjective:   CHANGE CHIEF COMPLAINT      :[id number]}274}  Chief Complaint   Patient presents with    Establish Care     Patient reports to the clinic today to establish care.    Health Maintenance     HIV Screening Never done  TETANUS VACCINE declined  Shingles Vaccine(1 of 2) declined  Diabetes Urine Screening due on 2022  RSV Vaccine (Age 60+ and Pregnant patients)(1 - Risk 60-74 years 1-dose series) declined  COVID-19 Vaccine(2024- season) declined  Lipid Panel due on 2025  Hemoglobin A1c due on 2025       History of Present Illness    HPI:  Patient is a 59-year-old female with a complex medical history including multiple cardiovascular issues and diabetes. She has had 5 heart attacks in the past and undergone various cardiovascular procedures, including stent placements. Her left leg was amputated due to vascular issues, initially performed below the knee but later revised to above the knee due to gangrene. She also had gangrene in her right foot, resulting in the loss of the right great toe, performed by Dr. Lu. She uses a prosthetic leg and is on her second prosthetic. She did not use the prosthetic for a year due to leg swelling, following advice from her healthcare team including Dr. Avni García.    Her diabetes is currently managed with multiple medications: glimepiride 2mg with breakfast, Lantus 35 units at night, Novolog mix 30-70 20 units twice daily as needed, and pioglitazone (Actos) 15mg. She checks her blood sugar and adjusts her insulin intake accordingly.    She reports issues with her breasts, mentioning a previous biopsy about a year ago and recent mammogram complications due to her size, which  resulted in bruising. She had follow-up imaging to address concerns raised by the initial mammogram.    She has dentures but is not currently wearing them due to swollen sinuses. She reports needing thyroid function (TSH) testing.    She denies having congestive heart failure.      ROS:  ENT: +nasal congestion, +sinus pressure  Cardiovascular: -lower extremity edema  Musculoskeletal: -limb swelling         Allergies and Meds: 274}     Review of patient's allergies indicates:   Allergen Reactions    Bee sting [allergen ext-venom-honey bee] Anaphylaxis    Nsaids (non-steroidal anti-inflammatory drug) Anaphylaxis    Grass pollen-alyssa, standard     Neurontin [gabapentin] Hallucinations    Topiramate      Other reaction(s): Unknown        Current Outpatient Medications   Medication Sig Dispense Refill    acetaminophen (TYLENOL) 500 MG tablet Take 1,000 mg by mouth every 6 (six) hours as needed for Pain.      amitriptyline (ELAVIL) 25 MG tablet Take 1 tablet (25 mg total) by mouth once daily. 180 tablet 1    amitriptyline (ELAVIL) 50 MG tablet Take 1 tablet (50 mg total) by mouth every evening. 90 tablet 0    atorvastatin (LIPITOR) 80 MG tablet Take 1 tablet (80 mg total) by mouth every evening. 90 tablet 1    blood sugar diagnostic Strp 1 strip by Misc.(Non-Drug; Combo Route) route 3 (three) times daily. 200 strip 6    clopidogreL (PLAVIX) 75 mg tablet Take 1 tablet (75 mg total) by mouth once daily. 90 tablet 1    cycloSPORINE (RESTASIS) 0.05 % ophthalmic emulsion PLACE 1 DROP INTO BOTH EYES TWICE DAILY 60 each 3    dexlansoprazole (DEXILANT) 60 mg capsule Take 1 capsule (60 mg total) by mouth once daily. 90 capsule 1    diclofenac sodium (VOLTAREN) 1 % Gel Apply topically.      docusate sodium (COLACE) 50 MG capsule Take 50 mg by mouth once daily.      eszopiclone (LUNESTA) 3 mg Tab TAKE 1 TABLET BY MOUTH EVERY NIGHT AT BEDTIME AS NEEDED FOR REST ..NO ALCOHOL WHILE TAKING MEDICATION 30 tablet 1    fosinopriL  (MONOPRIL) 20 MG tablet Take 1 tablet (20 mg total) by mouth once daily. 90 tablet 6    glimepiride (AMARYL) 2 MG tablet Take 1 tablet (2 mg total) by mouth daily with breakfast. 90 tablet 1    HYDROcodone-acetaminophen (NORCO)  mg per tablet TAKE 1 TABLET BY MOUTH 4 TIMES DAILY AS NEEDED ..MAY CAUSE DROWSINESS- AVOID ALCOHOL      isosorbide mononitrate (IMDUR) 60 MG 24 hr tablet Take 1 tablet (60 mg total) by mouth once daily. 90 tablet 3    LANTUS SOLOSTAR U-100 INSULIN 100 unit/mL (3 mL) InPn pen INJECT INJECT 35 UNITS UNDER THE SKIN EVERY NIGHT AT BEDTIME 12 mL 4    loratadine (CLARITIN) 10 mg tablet Take 1 tablet (10 mg total) by mouth once daily. 20 tablet 3    methocarbamoL (ROBAXIN) 750 MG Tab TAKE 1 TABLET BY MOUTH 3 TIMES DAILY WITH FOOD AS NEEDED ..MAY CAUSE DROWSINESS- AVOID ALCOHOL      metoprolol succinate (TOPROL-XL) 50 MG 24 hr tablet Take 1 tablet (50 mg total) by mouth once daily. 90 tablet 6    morphine (MS CONTIN) 15 MG 12 hr tablet TAKE 1 TABLET BY MOUTH EACH NIGHT AT BEDTIME ..MAY CAUSE DROWSINESS- AVOID ALCOHOL      neomycin-polymyxin-hydrocortisone (CORTISPORIN) 3.5-10,000-1 mg/mL-unit/mL-% otic suspension Place 2 drops into the left ear 3 (three) times daily. 10 mL 0    nitroGLYCERIN (NITROSTAT) 0.4 MG SL tablet DISSOLVE 1 TABLET UNDER TONGUE AS NEEDED FOR CHEST PAIN EVERY 5 MINUTES ..NO MORE THAN 3 TABLETS AT A TIME SEEK MEDICAL ATTENTION IF NO RELIEF 25 tablet 2    NOVOLOG MIX 70-30FLEXPEN U-100 100 unit/mL (70-30) InPn pen Inject 20 Units into the skin 2 (two) times a day. (Patient taking differently: Inject 20 Units into the skin 2 (two) times daily as needed.) 12 mL 12    pioglitazone (ACTOS) 15 MG tablet Take 1 tablet (15 mg total) by mouth once daily. 90 tablet 1    pregabalin (LYRICA) 200 MG Cap TAKE 1 CAPSULE BY MOUTH 3 TIMES DAILY ..MAY CAUSE DROWSINESS- AVOID ALCOHOL 90 capsule 0    promethazine (PHENERGAN) 50 MG tablet TAKE 1 TABLET BY MOUTH EVERY 4-6 HOURS AS NEEDED FOR  NAUSEA *AVOID ALCOHOL *CAUSES DROWSINESS* (Patient taking differently: daily as needed. TAKE 1 TABLET BY MOUTH EVERY 4-6 HOURS AS NEEDED FOR NAUSEA *AVOID ALCOHOL *CAUSES DROWSINESS*) 60 tablet 1    ubrogepant (UBRELVY) 100 mg tablet Take 1 tablet (100 mg total) by mouth as needed for Migraine. If symptoms persist or return, may repeat dose after 2 hours. Maximum: 200 mg per 24 hours 8 tablet 5     No current facility-administered medications for this visit.       Labs:274}   I have reviewed labs below:    Lab Results   Component Value Date    WBC 10.16 05/16/2025    RBC 4.37 05/16/2025    HGB 12.8 05/16/2025    HCT 40.7 05/16/2025     05/16/2025     05/16/2025    K 3.8 05/16/2025     05/16/2025    CALCIUM 9.0 05/16/2025    GLU 62 (L) 05/16/2025    BUN 9 (L) 05/16/2025    CREATININE 0.71 05/16/2025    ESTGFRAFRICA 157 04/13/2021    EGFRNONAA 102 06/20/2022    ALT 16 05/16/2025    AST 21 05/16/2025    INR 0.96 03/20/2024    CHOL 146 01/30/2024    TRIG 174 (H) 01/30/2024    HDL 31 (L) 01/30/2024    LDLCALC 80 01/30/2024    TSH 0.869 05/24/2023    HGBA1C 7.0 12/03/2024    MICROALBUR 2.2 04/13/2021       Medical History: 274}     Past Medical History:   Diagnosis Date    AAA (abdominal aortic aneurysm) 08/18/2014    Acute superficial gastritis without hemorrhage 01/04/2022    Anemia 05/16/2018    Anxiety state 07/21/2015    Celiac disease 02/29/2016    Chest pain 08/05/2014    Coronary arteriosclerosis 12/18/2018    Depressive disorder 08/18/2014    Diabetes mellitus     Diastolic dysfunction 08/14/2018    Embolism and thrombosis of arteries of extremities 10/15/2015    Gastroesophageal reflux disease without esophagitis 07/18/2017    History of myocardial infarction 07/13/2014    Hypercholesterolemia 01/18/2016    Hypertension 07/15/2020    Insufficiency, arterial, peripheral 08/01/2019    Multi-vessel coronary artery stenosis 08/01/2019    Myocardial infarct 2015    pt states she has had 5 total  "MI's    Occlusion and stenosis of unspecified carotid artery 07/26/2019    Peripheral arterial occlusive disease 12/15/2016    Peripheral vascular disease 11/12/2020    Venous insufficiency (chronic) (peripheral) 10/15/2015      Tobacco Use History[1]   Past Surgical History:   Procedure Laterality Date    ABOVE-KNEE AMPUTATION Left     APPENDECTOMY Right     BREAST BIOPSY      CARDIAC CATHETERIZATION  02/04/2021    PCI to prox LAD; IVUS of LAD    CHOLECYSTECTOMY      OOPHORECTOMY      TOTAL ABDOMINAL HYSTERECTOMY          Health Maintenance:      Health Maintenance Topics with due status: Not Due       Topic Last Completion Date    Colorectal Cancer Screening 03/22/2024    Diabetic Eye Exam 04/29/2025    Mammogram 05/20/2025    High Dose Statin 07/10/2025    Diabetes Urine Screening 07/10/2025    Foot Exam 07/10/2025    Lipid Panel 07/10/2025    Influenza Vaccine Not Due       Objective:  274}   Vital Signs  Temp: 98.6 °F (37 °C)  Temp Source: Oral  Pulse: 82  Resp: 20  SpO2: 96 %  BP: 132/68  BP Location: Left arm  Patient Position: Sitting  Pain Score:   7  Height and Weight  Height: 5' 7" (170.2 cm)  Weight: 105.7 kg (233 lb)  BSA (Calculated - sq m): 2.24 sq meters  BMI (Calculated): 36.5  Weight in (lb) to have BMI = 25: 159.3    Over the last two weeks how often have you been bothered by little interest or pleasure in doing things: 0  Over the last two weeks how often have you been bothered by feeling down, depressed or hopeless: 0  PHQ-2 Total Score: 0    Wt Readings from Last 3 Encounters:   07/10/25 105.7 kg (233 lb)   06/03/25 98.4 kg (217 lb)   05/16/25 97.5 kg (215 lb)     Physical Exam  Vitals and nursing note reviewed.   Constitutional:       General: She is not in acute distress.     Appearance: Normal appearance. She is not ill-appearing.   HENT:      Head: Normocephalic.      Right Ear: Tympanic membrane, ear canal and external ear normal.      Left Ear: Tympanic membrane, ear canal and external " ear normal.      Nose: Nose normal.      Mouth/Throat:      Mouth: Mucous membranes are moist.      Pharynx: Oropharynx is clear.   Eyes:      Conjunctiva/sclera: Conjunctivae normal.   Neck:      Trachea: Trachea normal.   Cardiovascular:      Rate and Rhythm: Normal rate and regular rhythm.      Pulses:           Dorsalis pedis pulses are 1+ on the right side.        Posterior tibial pulses are 1+ on the right side.      Heart sounds: Normal heart sounds.   Pulmonary:      Effort: Pulmonary effort is normal. No respiratory distress.      Breath sounds: Normal breath sounds. No wheezing, rhonchi or rales.   Musculoskeletal:      Cervical back: Neck supple.      Right Lower Extremity: (right great toe)     Left Lower Extremity: Left leg is amputated above knee.   Feet:      Right foot:      Protective Sensation: 6 sites tested.  6 sites sensed.      Skin integrity: Erythema present. No warmth.      Comments: Missing right great toe  Lymphadenopathy:      Cervical: No cervical adenopathy.   Skin:     General: Skin is warm and dry.      Coloration: Skin is not jaundiced or pale.   Neurological:      General: No focal deficit present.      Mental Status: She is alert and oriented to person, place, and time.      Gait: Gait abnormal (sitting in wheelchair).   Psychiatric:         Mood and Affect: Mood normal.         Behavior: Behavior normal.          Assessment and Plan: 274}       1. Type 2 diabetes mellitus with other specified complication, without long-term current use of insulin  Assessment & Plan:  Lab Results   Component Value Date    HGBA1C 7.0 12/03/2024    HGBA1C 8.1 (H) 08/20/2024    HGBA1C 5.9 01/30/2024       Orders:  -     Comprehensive Metabolic Panel; Future; Expected date: 07/10/2025  -     Microalbumin/Creatinine Ratio, Urine; Future; Expected date: 07/10/2025  -     Hemoglobin A1C; Future; Expected date: 07/10/2025  -     glimepiride (AMARYL) 2 MG tablet; Take 1 tablet (2 mg total) by mouth daily  with breakfast.  Dispense: 90 tablet; Refill: 1  -     LANTUS SOLOSTAR U-100 INSULIN 100 unit/mL (3 mL) InPn pen; Inject 40 Units into the skin every evening.  Dispense: 36 mL; Refill: 3  -     NOVOLOG MIX 70-30FLEXPEN U-100 100 unit/mL (70-30) InPn pen; Inject 20 Units into the skin 2 (two) times daily as needed (for hyperglycemia).  Dispense: 36 mL; Refill: 3  -     pioglitazone (ACTOS) 15 MG tablet; Take 1 tablet (15 mg total) by mouth once daily.  Dispense: 90 tablet; Refill: 1  -     promethazine (PHENERGAN) 50 MG tablet; Take 0.5 tablets (25 mg total) by mouth every 6 (six) hours as needed for Nausea. *AVOID ALCOHOL *CAUSES DROWSINESS*  Dispense: 60 tablet; Refill: 1  -     blood sugar diagnostic Strp; 1 strip by Misc.(Non-Drug; Combo Route) route 3 (three) times daily.  Dispense: 300 strip; Refill: 6    2. Mixed hyperlipidemia  -     Comprehensive Metabolic Panel; Future; Expected date: 07/10/2025  -     Lipid Panel; Future; Expected date: 07/10/2025  -     atorvastatin (LIPITOR) 80 MG tablet; Take 1 tablet (80 mg total) by mouth every evening.  Dispense: 90 tablet; Refill: 3    3. Vitamin D deficiency  -     Vitamin D; Future; Expected date: 07/10/2025    4. Other long term (current) drug therapy  -     CBC Auto Differential; Future; Expected date: 07/10/2025  -     TSH; Future; Expected date: 07/10/2025  -     Vitamin B12 & Folate; Future; Expected date: 07/10/2025    5. Screening for HIV (human immunodeficiency virus)  -     HIV 1/2 Ag/Ab (4th Gen); Future; Expected date: 07/10/2025    6. Type 2 diabetes mellitus with diabetic peripheral angiopathy without gangrene, without long-term current use of insulin  Assessment & Plan:  Lab Results   Component Value Date    HGBA1C 7.0 12/03/2024    HGBA1C 8.1 (H) 08/20/2024    HGBA1C 5.9 01/30/2024         7. Essential (primary) hypertension  -     fosinopriL (MONOPRIL) 20 MG tablet; Take 1 tablet (20 mg total) by mouth once daily.  Dispense: 90 tablet; Refill: 3  -      metoprolol succinate (TOPROL-XL) 50 MG 24 hr tablet; Take 1 tablet (50 mg total) by mouth once daily.  Dispense: 90 tablet; Refill: 3    8. Primary insomnia  Assessment & Plan:  Reports she has been on Lunesta 3 mg at night for insomnia for many many years along with her chronic opioid management.  Reports she has been tried on numerous other sedative medications without affect.    MS  report reviewed via Epic with no suspicious activity noted.  Last 30 day refill per Dr. Munir Garcia 7/03/25.    Orders:  -     eszopiclone (LUNESTA) 3 mg Tab; Take 1 tablet (3 mg total) by mouth nightly as needed (For insomnia.). No alcohol while taking medication and avoid taking simultaneously with opioid pain medication.  Dispense: 30 tablet; Refill: 2    9. Non-seasonal allergic rhinitis due to pollen  -     loratadine (CLARITIN) 10 mg tablet; Take 1 tablet (10 mg total) by mouth once daily.  Dispense: 90 tablet; Refill: 3    10. Peripheral artery disease  Assessment & Plan:  History left above the knee amputation.  Followed by vascular.      11. Atherosclerosis of native coronary artery of native heart without angina pectoris  -     clopidogreL (PLAVIX) 75 mg tablet; Take 1 tablet (75 mg total) by mouth once daily.  Dispense: 90 tablet; Refill: 3  -     metoprolol succinate (TOPROL-XL) 50 MG 24 hr tablet; Take 1 tablet (50 mg total) by mouth once daily.  Dispense: 90 tablet; Refill: 3    12. Gastroesophageal reflux disease without esophagitis  -     dexlansoprazole (DEXILANT) 60 mg capsule; Take 1 capsule (60 mg total) by mouth once daily.  Dispense: 90 capsule; Refill: 3    13. Esophageal dysphagia  -     dexlansoprazole (DEXILANT) 60 mg capsule; Take 1 capsule (60 mg total) by mouth once daily.  Dispense: 90 capsule; Refill: 3    14. Severe obesity (BMI 35.0-39.9) with comorbidity  Assessment & Plan:  Body mass index is 36.49 kg/m². Morbid obesity complicates all aspects of disease management from diagnostic modalities to  treatment. Weight loss encouraged and health benefits explained to patient.      15. Chronic systolic heart failure  Assessment & Plan:  Followed by Cardiology, Dr. Homero Delarosa  Ischemic cardiomyopathy; hx of NSTEMI s/p PCI  EF 35% on last echo Nov 2022  Continue fosinopril 20mg daily, metoprolol 50mg daily, and Imdur      16. 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          Assessment & Plan    IMPRESSION:  - Reviewed complex medical history, including multiple heart attacks, vascular issues leading to left leg amputation, and diabetes management.  - Considered continuation of Lunesta despite concurrent pain medication use, will have a  discussion with collab MD Dr. Garay due to long-term use and failed attempts with other sleep medications.  - Diabetes appears well-controlled on current regimen.  - Evaluated recent lab results, including H&H levels, which had shown some changes.    TYPE 2 DIABETES MELLITUS WITH DIABETIC PERIPHERAL ANGIOPATHY AND GANGRENE:  - Continue current diabetes medication regimen for now: Glimepiride 2 mg with breakfast, Lantus 35 units at night, Novolog mix 30/70 20 units twice daily as needed, Pioglitazone (Actos) 15 mg daily.  - Ordered diabetes urine test and A1C test.  - Monitor diabetes management regularly with medication adjustments as needed.  - Patient has history of gangrene in right foot with previous toe amputation.    OLD MYOCARDIAL INFARCTION:  - Patient has history of 5 heart attacks.  - Multiple stents have been placed due to previous myocardial infarctions.    Patient asked if Medicare AWV is mandatory.  Advised it is not, but highly recommended Medicare wellness visit scheduled for August 28th.       Follow up in about 4 months (around 11/10/2025) for T2DM, insomnia, with nonfasting lab.    Signature:  Anne-Marie Neville,  [facility]    Future Appointments   Date Time Provider Department Center   8/28/2025  1:00 PM AWV NURSE, [facility] FAMILY MEDICINE [facility] FAMMED [facility]   11/3/2025 10:00 AM Kathie Puri, [facility] VSCSRG [facility]   11/4/2025  8:00 AM Desi Chopra, [facility] RASCC GASTR [facility]   11/4/2025  9:30 AM Sherwin Rodriguez, [facility] NEURO [facility]   11/5/2025  8:20 AM Anne-Marie Neville, [facility] FAMMED [facility]   5/5/2026  8:40 AM Kerri Givens, [facility] CARD [facility]   5/14/2026  8:00 AM [facility] MAMMO2 [facility] MMIC [facility] [place]     This note was generated with the assistance of ambient listening technology. Verbal consent was obtained by the patient and accompanying visitor(s) for the recording of patient appointment to facilitate this note. I attest to having reviewed and edited the generated note for accuracy, though some syntax or spelling errors may persist. Please contact the author of this note for any clarification.           [1]   Social History  Tobacco Use   Smoking Status Never   Smokeless Tobacco Never

## 2025-07-11 NOTE — ASSESSMENT & PLAN NOTE
Followed by Cardiology, Dr. Homero Delarosa  Ischemic cardiomyopathy; hx of NSTEMI s/p PCI  EF 35% on last echo Nov 2022  Continue fosinopril 20mg daily, metoprolol 50mg daily, and Imdur

## 2025-07-11 NOTE — ASSESSMENT & PLAN NOTE
Lab Results   Component Value Date    HGBA1C 7.0 12/03/2024    HGBA1C 8.1 (H) 08/20/2024    HGBA1C 5.9 01/30/2024

## 2025-07-11 NOTE — ASSESSMENT & PLAN NOTE
Body mass index is 36.49 kg/m². Morbid obesity complicates all aspects of disease management from diagnostic modalities to treatment. Weight loss encouraged and health benefits explained to patient.

## 2025-07-11 NOTE — ASSESSMENT & PLAN NOTE
Reports she has been on Lunesta 3 mg at night for insomnia for many many years along with her chronic opioid management.  Reports she has been tried on numerous other sedative medications without affect.    MS  report reviewed via Epic with no suspicious activity noted.  Last 30 day refill per Dr. Munir Garcia 7/03/25.

## 2025-07-20 ENCOUNTER — HOSPITAL ENCOUNTER (EMERGENCY)
Facility: HOSPITAL | Age: 60
Discharge: HOME | End: 2025-07-20
Attending: FAMILY MEDICINE
Payer: MEDICARE

## 2025-07-20 VITALS
WEIGHT: 234 LBS | HEART RATE: 82 BPM | RESPIRATION RATE: 17 BRPM | DIASTOLIC BLOOD PRESSURE: 48 MMHG | SYSTOLIC BLOOD PRESSURE: 153 MMHG | HEIGHT: 67 IN | TEMPERATURE: 98 F | BODY MASS INDEX: 36.73 KG/M2 | OXYGEN SATURATION: 97 %

## 2025-07-20 DIAGNOSIS — R55 VASO VAGAL EPISODE: ICD-10-CM

## 2025-07-20 DIAGNOSIS — T14.90XA TRAUMA: ICD-10-CM

## 2025-07-20 DIAGNOSIS — S20.20XA CONTUSION OF THORACIC WALL, UNSPECIFIED WHETHER FRONT OR BACK, INITIAL ENCOUNTER: ICD-10-CM
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
## 2025-07-20 LAB — GLUCOSE SERPL-MCNC: 132 MG/DL (ref 70–105)

## 2025-07-20 PROCEDURE — G0390 TRAUMA RESPONS W/HOSP CRITI: HCPCS

## 2025-07-20 PROCEDURE — 99285 EMERGENCY DEPT VISIT HI MDM: CPT | Mod: 25

## 2025-07-20 PROCEDURE — 82962 GLUCOSE BLOOD TEST: CPT

## 2025-07-20 RX ORDER — TIZANIDINE 4 MG/1
4 TABLET ORAL EVERY 6 HOURS PRN
Qty: 30 TABLET | Refills: 0 | Status: SHIPPED | OUTPATIENT
Start: 2025-07-20 | End: 2025-07-30

## 2025-07-20 NOTE — ED PROVIDER NOTES
Encounter Date: 7/20/2025       History     Chief Complaint   Patient presents with    Fall     Patient states fall this morning, hit head, on blood thinners, denies LOC. C/o right sided rib pain and hematoma to right temporal region      Patient was sitting on commode and she had a vasovagal reaction.  Causing her hit her right side head and ribs wall--she is on Eliquis.  She had some neck tenderness also        Review of patient's allergies indicates:   Allergen Reactions    Bee sting [allergen ext-venom-honey bee] Anaphylaxis    Nsaids (non-steroidal anti-inflammatory drug) Anaphylaxis    Grass pollen-alyssa, standard     Neurontin [gabapentin] Hallucinations    Topiramate      Other reaction(s): Unknown     Past Medical History:   Diagnosis Date    AAA (abdominal aortic aneurysm) 08/18/2014    Acute superficial gastritis without hemorrhage 01/04/2022    Anemia 05/16/2018    Anxiety state 07/21/2015    Celiac disease 02/29/2016    Chest pain 08/05/2014    Coronary arteriosclerosis 12/18/2018    Depressive disorder 08/18/2014    Diabetes mellitus     Diastolic dysfunction 08/14/2018    Embolism and thrombosis of arteries of extremities 10/15/2015    Gastroesophageal reflux disease without esophagitis 07/18/2017    History of myocardial infarction 07/13/2014    Hypercholesterolemia 01/18/2016    Hypertension 07/15/2020    Insufficiency, arterial, peripheral 08/01/2019    Multi-vessel coronary artery stenosis 08/01/2019    Myocardial infarct 2015    pt states she has had 5 total MI's    Occlusion and stenosis of unspecified carotid artery 07/26/2019    Peripheral arterial occlusive disease 12/15/2016    Peripheral vascular disease 11/12/2020    Venous insufficiency (chronic) (peripheral) 10/15/2015     Past Surgical History:   Procedure Laterality Date    ABOVE-KNEE AMPUTATION Left     APPENDECTOMY Right     BREAST BIOPSY      CARDIAC CATHETERIZATION  02/04/2021    PCI to prox LAD; IVUS of LAD    CHOLECYSTECTOMY       OOPHORECTOMY      TOTAL ABDOMINAL HYSTERECTOMY       Family History   Problem Relation Name Age of Onset    Heart disease Mother      Stroke Mother      Heart disease Brother       Social History[1]  Review of Systems   Constitutional: Negative.  Negative for fever.   HENT: Negative.  Negative for sore throat.    Eyes: Negative.    Respiratory: Negative.  Negative for shortness of breath.    Cardiovascular: Negative.  Negative for chest pain.   Gastrointestinal: Negative.  Negative for nausea.   Endocrine: Negative.    Genitourinary: Negative.  Negative for dysuria.   Musculoskeletal: Negative.  Negative for back pain.   Skin: Negative.  Negative for rash.   Allergic/Immunologic: Negative.    Neurological: Negative.  Negative for weakness.   Hematological: Negative.  Does not bruise/bleed easily.   Psychiatric/Behavioral: Negative.     All other systems reviewed and are negative.      Physical Exam     Initial Vitals   BP Pulse Resp Temp SpO2   07/20/25 0824 07/20/25 0824 07/20/25 0831 07/20/25 0831 07/20/25 0831   138/77 85 17 97.7 °F (36.5 °C) 98 %      MAP       --                Physical Exam    Constitutional: She appears well-developed and well-nourished.   HENT:   Head: Normocephalic and atraumatic.   Right Ear: External ear normal.   Left Ear: External ear normal.   Nose: Nose normal. Mouth/Throat: Oropharynx is clear and moist.   Eyes: Conjunctivae and EOM are normal. Pupils are equal, round, and reactive to light.   Neck: Neck supple.   Normal range of motion.  Cardiovascular:  Normal rate, regular rhythm, normal heart sounds and intact distal pulses.           Pulmonary/Chest: Breath sounds normal.   Abdominal: Abdomen is soft. Bowel sounds are normal.   Genitourinary:    Vagina and uterus normal.     Musculoskeletal:         General: Normal range of motion.      Cervical back: Normal range of motion and neck supple.      Comments: Right ribcage pain.     Neurological: She is alert and oriented to  person, place, and time. She has normal strength and normal reflexes.   Skin: Skin is warm. Capillary refill takes less than 2 seconds.   Psychiatric: She has a normal mood and affect. Her behavior is normal. Judgment and thought content normal.         Medical Screening Exam   See Full Note    ED Course   Procedures  Labs Reviewed   POCT GLUCOSE MONITORING CONTINUOUS - Abnormal       Result Value    POC Glucose 132 (*)           Imaging Results              X-Ray Ribs 2 View Right (Final result)  Result time 07/20/25 09:47:19      Final result by Joel Ott MD (07/20/25 09:47:19)                   Impression:      No evidence of acute fracture.      Electronically signed by: Joel Ott MD  Date:    07/20/2025  Time:    09:47               Narrative:    EXAMINATION:  XR RIBS 2 VIEW RIGHT    CLINICAL HISTORY:  Injury, unspecified, initial encounter    TECHNIQUE:  Two views of the right ribs were performed.    COMPARISON:  Plain film from 03/20/2024    FINDINGS:  No evidence of acute fracture or dislocation.  Linear atelectasis of the right lower lung zone.  Surgical clips in the axilla.  Surgical clips in the right upper quadrant.                                       CT Cervical Spine Without Contrast (Final result)  Result time 07/20/25 09:18:41      Final result by Joel Ott MD (07/20/25 09:18:41)                   Impression:      No evidence of acute fracture.      Electronically signed by: Joel Ott MD  Date:    07/20/2025  Time:    09:18               Narrative:    EXAMINATION:  CT CERVICAL SPINE WITHOUT CONTRAST    CLINICAL HISTORY:  Neck trauma, uncomplicated (NEXUS/CCR neg) (Age 16-64y);    TECHNIQUE:  Low dose axial images, sagittal and coronal reformations were performed though the cervical spine.  Contrast was not administered.    COMPARISON:  MRI cervical spine from 10/21/2024    FINDINGS:  Visualized intracranial structures are normal in appearance.  Visualized  paranasal sinuses and mastoid air cells are clear.  Normal thyroid.  Soft tissue structures of the neck are normal in appearance.  No evidence of lymphadenopathy.  Mild calcific atherosclerosis of the ICAs bilaterally.  Lung apices are clear.    Vertebral body heights, spinal alignment, and intervertebral disc spaces are maintained.  Mild multilevel degenerative changes of the spine with no high-grade central spinal canal stenosis or neural foraminal narrowing.                                       CT Head Without Contrast (Final result)  Result time 07/20/25 09:17:16      Final result by Joel Ott MD (07/20/25 09:17:16)                   Impression:      No acute intracranial abnormality.  Follow-up/further evaluation as clinically indicated.      Electronically signed by: Joel Ott MD  Date:    07/20/2025  Time:    09:17               Narrative:    EXAMINATION:  CT HEAD WITHOUT CONTRAST    CLINICAL HISTORY:  Facial trauma, blunt;    TECHNIQUE:  Low dose axial CT images obtained throughout the head without intravenous contrast. Sagittal and coronal reconstructions were performed.    COMPARISON:  Head CT from 10/20/2021    FINDINGS:  Intracranial compartment:    Ventricles and sulci are normal in size for age without evidence of hydrocephalus. No extra-axial blood or fluid collections.    The brain parenchyma appears normal. No parenchymal mass, hemorrhage, edema or major vascular distribution infarct.    Skull/extracranial contents (limited evaluation): No fracture. Mastoid air cells and paranasal sinuses are essentially clear.  Soft tissue swelling superficial to the right parietotemporal calvarium consistent with contusion.                                       Medications - No data to display  Medical Decision Making  Amount and/or Complexity of Data Reviewed  Radiology: ordered.    Risk  Prescription drug management.                          Medical Decision Making:   Initial Assessment:    Patient was sitting on commode and she had a vasovagal reaction.  Causing her hit her right side head and ribs wall--she is on Eliquis.  She had some neck tenderness also        Differential Diagnosis:   Cervical strain.  Contusion right ribs.  And contusion to scalp and head.             Clinical Impression:   Final diagnoses:  [T14.90XA] Trauma  [W19.XXXA] Fall, initial encounter (Primary)  [S20.20XA] Contusion of thoracic wall, unspecified whether front or back, initial encounter  [R55] Vaso vagal episode        ED Disposition Condition    Discharge           ED Prescriptions       Medication Sig Dispense Start Date End Date Auth. Provider    tiZANidine (ZANAFLEX) 4 MG tablet Take 1 tablet (4 mg total) by mouth every 6 (six) hours as needed. 30 tablet 7/20/2025 7/30/2025 Jerald Ansari, DO          Follow-up Information    None              [1]   Social History  Tobacco Use    Smoking status: Never    Smokeless tobacco: Never   Substance Use Topics    Alcohol use: Not Currently    Drug use: Never        Jerald Ansari DO  07/23/25 [id number]

## 2025-07-21 ENCOUNTER — TELEPHONE (OUTPATIENT)
Dept: EMERGENCY MEDICINE | Facility: HOSPITAL | Age: 60
End: 2025-07-21
Payer: MEDICARE

## 2025-08-21 DIAGNOSIS — I25.10 ATHEROSCLEROSIS OF NATIVE CORONARY ARTERY OF NATIVE HEART WITHOUT ANGINA PECTORIS: Chronic | ICD-10-CM

## 2025-08-21 DIAGNOSIS — E11.69 TYPE 2 DIABETES MELLITUS WITH OTHER SPECIFIED COMPLICATION, WITHOUT LONG-TERM CURRENT USE OF INSULIN: Chronic | ICD-10-CM

## 2025-08-21 RX ORDER — GLIMEPIRIDE 2 MG/1
2 TABLET ORAL
Qty: 90 TABLET | Refills: 1 | Status: SHIPPED | OUTPATIENT
Start: 2025-08-21

## 2025-08-21 RX ORDER — CLOPIDOGREL BISULFATE 75 MG/1
75 TABLET ORAL
Qty: 90 TABLET | Refills: 1 | Status: SHIPPED | OUTPATIENT
Start: 2025-08-21

## 2025-08-21 RX ORDER — PIOGLITAZONE 15 MG/1
15 TABLET ORAL DAILY
Qty: 90 TABLET | Refills: 1 | Status: SHIPPED | OUTPATIENT
Start: 2025-08-21